# Patient Record
Sex: FEMALE | Race: WHITE | NOT HISPANIC OR LATINO | Employment: OTHER | URBAN - NONMETROPOLITAN AREA
[De-identification: names, ages, dates, MRNs, and addresses within clinical notes are randomized per-mention and may not be internally consistent; named-entity substitution may affect disease eponyms.]

---

## 2017-01-03 DIAGNOSIS — F34.1 DYSTHYMIA: Primary | ICD-10-CM

## 2017-01-03 NOTE — Clinical Note
[address] [address]              Raquel Sanchez  [address] [facility] [address]    January 5, 2017      Dear Raquel Sanchez    APPOINTMENT REMINDER:   Our records indicates that it is time for you to be seen for a recheck.     Your current medication request for Celexa will be approved for one refill but you will need to be seen before any additional refills can be approved.  Taking care of your health is important to us, and ongoing visits with your provider are vital to your care.    We look forward to seeing you in the near future.  You may call our office at [phone] to schedule a visit.     Please disregard this notice if you have already made an appointment.          Sincerely,    Pelon David MD

## 2017-01-03 NOTE — TELEPHONE ENCOUNTER
Celexa     Last Written Prescription Date: 12/08/16  Last Fill Quantity: 30, # refills: 0  Last Office Visit with [facility] primary care provider:  05/16/2016        Last PHQ-9 score on record=   PHQ-9 SCORE 5/5/2016   Total Score -   Total Score 15

## 2017-01-04 DIAGNOSIS — R21 RASH AND NONSPECIFIC SKIN ERUPTION: Primary | ICD-10-CM

## 2017-01-04 RX ORDER — HYDROCODONE BITARTRATE AND ACETAMINOPHEN 5; 325 MG/1; MG/1
1 TABLET ORAL EVERY 6 HOURS PRN
Qty: 30 TABLET | Refills: 0 | Status: SHIPPED | OUTPATIENT
Start: 2017-01-04 | End: 2017-01-25

## 2017-01-04 NOTE — TELEPHONE ENCOUNTER
norco      Last Written Prescription Date: 12/8/16  Last Fill Quantity: 60,  # refills: 0   Last Office Visit with G, [facility] or [facility] prescribing provider: 5/16/16

## 2017-01-04 NOTE — TELEPHONE ENCOUNTER
Last visit for pain med was 2-16. Needs appt with Dr. David. I will fill for 2 weeks, so she has time to arrange.

## 2017-01-04 NOTE — TELEPHONE ENCOUNTER
Called informed written RX is ready to  at  [facility]  Advised patient needs a appt with Dr David for rita Riggs

## 2017-01-05 RX ORDER — CITALOPRAM HYDROBROMIDE 20 MG/1
TABLET ORAL
Qty: 30 TABLET | Refills: 0 | Status: SHIPPED | OUTPATIENT
Start: 2017-01-05 | End: 2017-01-25

## 2017-01-25 ENCOUNTER — OFFICE VISIT (OUTPATIENT)
Dept: FAMILY MEDICINE | Facility: OTHER | Age: 67
End: 2017-01-25
Attending: FAMILY MEDICINE
Payer: MEDICARE

## 2017-01-25 VITALS
BODY MASS INDEX: 23.39 KG/M2 | WEIGHT: 132 LBS | HEIGHT: 63 IN | OXYGEN SATURATION: 98 % | DIASTOLIC BLOOD PRESSURE: 84 MMHG | SYSTOLIC BLOOD PRESSURE: 130 MMHG | TEMPERATURE: 97.6 F | RESPIRATION RATE: 16 BRPM | HEART RATE: 92 BPM

## 2017-01-25 DIAGNOSIS — F34.1 DYSTHYMIA: ICD-10-CM

## 2017-01-25 DIAGNOSIS — R21 RASH AND NONSPECIFIC SKIN ERUPTION: ICD-10-CM

## 2017-01-25 DIAGNOSIS — G89.29 OTHER CHRONIC PAIN: ICD-10-CM

## 2017-01-25 DIAGNOSIS — F41.9 ANXIETY: Primary | ICD-10-CM

## 2017-01-25 PROBLEM — Z79.899 CONTROLLED SUBSTANCE AGREEMENT SIGNED: Status: ACTIVE | Noted: 2017-01-25

## 2017-01-25 LAB
AMPHETAMINES UR QL SCN: ABNORMAL
BARBITURATES UR QL: ABNORMAL
BENZODIAZ UR QL: ABNORMAL
CANNABINOIDS UR QL SCN: ABNORMAL
COCAINE UR QL: ABNORMAL
METHADONE UR QL SCN: ABNORMAL
OPIATES UR QL SCN: ABNORMAL
PCP UR QL SCN: ABNORMAL

## 2017-01-25 PROCEDURE — 99212 OFFICE O/P EST SF 10 MIN: CPT

## 2017-01-25 PROCEDURE — 80307 DRUG TEST PRSMV CHEM ANLYZR: CPT | Performed by: FAMILY MEDICINE

## 2017-01-25 PROCEDURE — 99214 OFFICE O/P EST MOD 30 MIN: CPT | Performed by: FAMILY MEDICINE

## 2017-01-25 RX ORDER — CLONAZEPAM 0.5 MG/1
TABLET ORAL
Qty: 30 TABLET | Refills: 5 | Status: SHIPPED | OUTPATIENT
Start: 2017-01-25 | End: 2017-01-25

## 2017-01-25 RX ORDER — HYDROCODONE BITARTRATE AND ACETAMINOPHEN 5; 325 MG/1; MG/1
1 TABLET ORAL EVERY 6 HOURS PRN
Qty: 60 TABLET | Refills: 0 | Status: SHIPPED | OUTPATIENT
Start: 2017-01-25 | End: 2017-02-22

## 2017-01-25 RX ORDER — CITALOPRAM HYDROBROMIDE 20 MG/1
TABLET ORAL
Qty: 30 TABLET | Refills: 5 | Status: SHIPPED | OUTPATIENT
Start: 2017-01-25 | End: 2017-04-19

## 2017-01-25 RX ORDER — CLONAZEPAM 0.5 MG/1
TABLET ORAL
Qty: 60 TABLET | Refills: 5 | Status: SHIPPED | OUTPATIENT
Start: 2017-01-25 | End: 2017-07-18

## 2017-01-25 ASSESSMENT — ANXIETY QUESTIONNAIRES
1. FEELING NERVOUS, ANXIOUS, OR ON EDGE: NEARLY EVERY DAY
5. BEING SO RESTLESS THAT IT IS HARD TO SIT STILL: NOT AT ALL
2. NOT BEING ABLE TO STOP OR CONTROL WORRYING: NEARLY EVERY DAY
3. WORRYING TOO MUCH ABOUT DIFFERENT THINGS: NEARLY EVERY DAY
7. FEELING AFRAID AS IF SOMETHING AWFUL MIGHT HAPPEN: NEARLY EVERY DAY
IF YOU CHECKED OFF ANY PROBLEMS ON THIS QUESTIONNAIRE, HOW DIFFICULT HAVE THESE PROBLEMS MADE IT FOR YOU TO DO YOUR WORK, TAKE CARE OF THINGS AT HOME, OR GET ALONG WITH OTHER PEOPLE: SOMEWHAT DIFFICULT
GAD7 TOTAL SCORE: 16
6. BECOMING EASILY ANNOYED OR IRRITABLE: MORE THAN HALF THE DAYS

## 2017-01-25 ASSESSMENT — PATIENT HEALTH QUESTIONNAIRE - PHQ9: 5. POOR APPETITE OR OVEREATING: MORE THAN HALF THE DAYS

## 2017-01-25 ASSESSMENT — PAIN SCALES - GENERAL: PAINLEVEL: NO PAIN (0)

## 2017-01-25 NOTE — MR AVS SNAPSHOT
"              After Visit Summary   1/25/2017    Raquel Sanchez    MRN: [id number]           Patient Information     Date Of Birth          1950        Visit Information        Provider Department      1/25/2017 10:15 AM Pelon David MD [facility]        Today's Diagnoses     Anxiety    -  1     Dysthymia         Rash and nonspecific skin eruption         Other chronic pain           Care Instructions    F/u with ongoing concerns.         Follow-ups after your visit        Who to contact     If you have questions or need follow up information about today's clinic visit or your schedule please contact [facility] directly at [phone].  Normal or non-critical lab and imaging results will be communicated to you by MyChart, letter or phone within 4 business days after the clinic has received the results. If you do not hear from us within 7 days, please contact the clinic through MyChart or phone. If you have a critical or abnormal lab result, we will notify you by phone as soon as possible.  Submit refill requests through [employer] or call your pharmacy and they will forward the refill request to us. Please allow 3 business days for your refill to be completed.          Additional Information About Your Visit        Care EveryWhere ID     This is your Care EveryWhere ID. This could be used by other organizations to access your [facility] medical records  [id number]        Your Vitals Were     Pulse Temperature Respirations Height BMI (Body Mass Index) Pulse Oximetry    92 97.6  F (36.4  C) (Tympanic) 16 5' 2.5\" (1.588 m) 23.74 kg/m2 98%       Blood Pressure from Last 3 Encounters:   01/25/17 130/84   07/12/16 122/68   05/16/16 132/76    Weight from Last 3 Encounters:   01/25/17 132 lb (59.875 kg)   07/12/16 152 lb (68.947 kg)   05/16/16 152 lb (68.947 kg)              We Performed the Following     Drug Screen Urine (Range)          Today's Medication Changes          These " changes are accurate as of: 1/25/17 12:45 PM.  If you have any questions, ask your nurse or doctor.               Start taking these medicines.        Dose/Directions    clonazePAM 0.5 MG tablet   Commonly known as:  klonoPIN   Used for:  Anxiety   Started by:  Pelon David MD        1 tab bid prn   Quantity:  60 tablet   Refills:  5         These medicines have changed or have updated prescriptions.        Dose/Directions    citalopram 20 MG tablet   Commonly known as:  celeXA   This may have changed:  See the new instructions.   Used for:  Dysthymia   Changed by:  Pelon David MD        TAKE 1 TABLET (20MG) BY MOU TH DAILY   Quantity:  30 tablet   Refills:  5            Where to get your medicines      These medications were sent to [id number] - [place] - [address]  [address] [place] [address]     Phone:  [phone]    - citalopram 20 MG tablet      Some of these will need a paper prescription and others can be bought over the counter.  Ask your nurse if you have questions.     Bring a paper prescription for each of these medications    - clonazePAM 0.5 MG tablet  - HYDROcodone-acetaminophen 5-325 MG per tablet             Primary Care Provider Office Phone # Fax #    Pelon David [phone]       [address] [place] [facility] [address]        Thank you!     Thank you for choosing [facility]  for your care. Our goal is always to provide you with excellent care. Hearing back from our patients is one way we can continue to improve our services. Please take a few minutes to complete the written survey that you may receive in the mail after your visit with us. Thank you!             Your Updated Medication List - Protect others around you: Learn how to safely use, store and throw away your medicines at www.disposemymeds.org.          This list is accurate as of: 1/25/17 12:45 PM.  Always use your most recent med  list.                   Brand Name Dispense Instructions for use    citalopram 20 MG tablet    celeXA    30 tablet    TAKE 1 TABLET (20MG) BY CONCEPCIÓN TH DAILY       clonazePAM 0.5 MG tablet    klonoPIN    60 tablet    1 tab bid prn       HYDROcodone-acetaminophen 5-325 MG per tablet    NORCO    60 tablet    Take 1 tablet by mouth every 6 hours as needed for moderate to severe pain       hydrOXYzine 25 MG capsule    VISTARIL    120 capsule    Take 1 capsule (25 mg) by mouth 3 times daily as needed for itching       MULTIVITAMIN ADULTS 50+ Tabs      Take 1 tablet by mouth daily       * triamcinolone 0.025 % cream    KENALOG    80 g    Apply once a day for itch or redness       * triamcinolone 0.5 % cream    KENALOG    30 g    APPLY SPARINGLY TO AFFECTED AREA THREE TIMES DAILY.       * Notice:  This list has 2 medication(s) that are the same as other medications prescribed for you. Read the directions carefully, and ask your doctor or other care provider to review them with you.

## 2017-01-25 NOTE — NURSING NOTE
"Chief Complaint   Patient presents with     Refill Request     Hydrocodone, Celexa and Klonopin (meds pended)       Initial /84 mmHg  Pulse 92  Temp(Src) 97.6  F (36.4  C) (Tympanic)  Resp 16  Ht 5' 2.5\" (1.588 m)  Wt 132 lb (59.875 kg)  BMI 23.74 kg/m2  SpO2 98% Estimated body mass index is 23.74 kg/(m^2) as calculated from the following:    Height as of this encounter: 5' 2.5\" (1.588 m).    Weight as of this encounter: 132 lb (59.875 kg).  BP completed using cuff size: regular  Kathy Darnell LPN      "
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
## 2017-01-25 NOTE — Clinical Note
1/26/2017     Raquel Sanchez  [address] [facility] [address]      Dear Raquel:    Thank you for allowing me to participate in your care. Your recent test results were reviewed and listed below. Your lab is normal.      Your results are provided below for your review  Results for orders placed or performed in visit on 01/25/17   Drug Screen Urine (Range)   Result Value Ref Range    Amphetamine Qual Urine  NEG     Negative   Cutoff for a negative amphetamine is 500 ng/mL or less.      Barbiturates Qual Urine  NEG     Negative   Cutoff for a negative barbiturate is 200 ng/mL or less.      Benzodiazepine Qual Urine  NEG     Negative   Cutoff for a negative benzodiazepine is 200 ng/mL or less.      Cannabinoids Qual Urine  NEG     Negative   Cutoff for a negative cannabinoid is 50 ng/mL or less.      Cocaine Qual Urine  NEG     Negative   Cutoff for a negative cocaine is 300 ng/mL or less.      Opiates Qualitative Urine (A) NEG     Positive   Cutoff for a positive opiate is greater than 300 ng/mL. This is an unconfirmed   screening result to be used for medical purposes only.      Methadone Qual Urine  NEG     Negative   Cutoff for a negative methadone is 300 ng/mL or less.      PCP Qual Urine  NEG     Negative   Cutoff for a negative PCP is 25 ng/mL or less.               Thank you for choosing [facility]. As a result, please continue with the treatment plan discussed in the office. Return as discussed or sooner if symptoms worsens or fail to improve. If you have any further questions or concerns, please do not hesitate to contact us.      Sincerely,    Dr MATHEW David/Maria De Jesus  [facility]  [address]  [facility] [address]  Phone: [phone]

## 2017-01-26 ASSESSMENT — ANXIETY QUESTIONNAIRES: GAD7 TOTAL SCORE: 16

## 2017-01-26 ASSESSMENT — PATIENT HEALTH QUESTIONNAIRE - PHQ9: SUM OF ALL RESPONSES TO PHQ QUESTIONS 1-9: 6

## 2017-02-22 ENCOUNTER — TELEPHONE (OUTPATIENT)
Dept: FAMILY MEDICINE | Facility: OTHER | Age: 67
End: 2017-02-22

## 2017-02-22 DIAGNOSIS — R21 RASH AND NONSPECIFIC SKIN ERUPTION: ICD-10-CM

## 2017-02-22 RX ORDER — HYDROCODONE BITARTRATE AND ACETAMINOPHEN 5; 325 MG/1; MG/1
1 TABLET ORAL EVERY 6 HOURS PRN
Qty: 60 TABLET | Refills: 0 | Status: SHIPPED | OUTPATIENT
Start: 2017-02-22 | End: 2017-03-22

## 2017-02-22 NOTE — TELEPHONE ENCOUNTER
Called informed written RX is ready to  at  [facility]  Called daughter/Jess REECE ph#[phone]  Liseth Riggs

## 2017-02-22 NOTE — TELEPHONE ENCOUNTER
Pt is in [place] with her spouse and is calling the pharmacy to have RX sent. She is requesting that her daughter be called when it is ready for pickup at [phone]. Her name is Jess Sanchez. Please and Thank you.

## 2017-03-22 DIAGNOSIS — R21 RASH AND NONSPECIFIC SKIN ERUPTION: ICD-10-CM

## 2017-03-22 RX ORDER — HYDROCODONE BITARTRATE AND ACETAMINOPHEN 5; 325 MG/1; MG/1
1 TABLET ORAL EVERY 6 HOURS PRN
Qty: 60 TABLET | Refills: 0 | Status: SHIPPED | OUTPATIENT
Start: 2017-03-22 | End: 2017-04-19

## 2017-03-22 NOTE — TELEPHONE ENCOUNTER
norco      Last Written Prescription Date: 2/22/17  Last Fill Quantity: 60,  # refills: 0   Last Office Visit with G, P or [facility] prescribing provider: 1/25/17

## 2017-04-12 ENCOUNTER — TELEPHONE (OUTPATIENT)
Dept: FAMILY MEDICINE | Facility: OTHER | Age: 67
End: 2017-04-12
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
## 2017-04-17 ENCOUNTER — OFFICE VISIT (OUTPATIENT)
Dept: FAMILY MEDICINE | Facility: OTHER | Age: 67
End: 2017-04-17
Attending: PHYSICIAN ASSISTANT
Payer: MEDICARE

## 2017-04-17 VITALS
HEART RATE: 111 BPM | BODY MASS INDEX: 23.47 KG/M2 | TEMPERATURE: 97.8 F | OXYGEN SATURATION: 98 % | WEIGHT: 130.4 LBS | DIASTOLIC BLOOD PRESSURE: 88 MMHG | RESPIRATION RATE: 20 BRPM | SYSTOLIC BLOOD PRESSURE: 144 MMHG

## 2017-04-17 DIAGNOSIS — L03.113 CELLULITIS OF RIGHT UPPER EXTREMITY: Primary | ICD-10-CM

## 2017-04-17 PROCEDURE — 99212 OFFICE O/P EST SF 10 MIN: CPT

## 2017-04-17 PROCEDURE — 99213 OFFICE O/P EST LOW 20 MIN: CPT | Performed by: PHYSICIAN ASSISTANT

## 2017-04-17 RX ORDER — CEPHALEXIN 500 MG/1
500 CAPSULE ORAL 3 TIMES DAILY
Qty: 30 CAPSULE | Refills: 0 | Status: SHIPPED | OUTPATIENT
Start: 2017-04-17 | End: 2017-10-04

## 2017-04-17 ASSESSMENT — PAIN SCALES - GENERAL: PAINLEVEL: NO PAIN (0)

## 2017-04-17 NOTE — PROGRESS NOTES
Subjective:  Raquel Sanchez is a 67 year old female who presents with 3 day history of skin tear to right arm. Today developed redness around the area.        PMH, PSH, FH reviewed and unchanged    Current Outpatient Prescriptions   Medication     HYDROcodone-acetaminophen (NORCO) 5-325 MG per tablet     citalopram (CELEXA) 20 MG tablet     clonazePAM (KLONOPIN) 0.5 MG tablet     Multiple Vitamins-Minerals (MULTIVITAMIN ADULTS 50+) TABS     hydrOXYzine (VISTARIL) 25 MG capsule     triamcinolone (KENALOG) 0.025 % cream     No current facility-administered medications for this visit.        Allergies   Allergen Reactions     Tape [Adhesive Tape] Blisters     Poison Ivy Extract [Extract Of Poison Ivy] Rash       Review of Systems:  Gen: negative for fever, chills, change in weight  Derm: See HPI       Objective:    B/P: 144/88, T: 97.8, P: 111, R: 20    Physical Exam:  Constitutional: healthy, alert and no distress  Skin: 2 cm skin tear right dorsal forearm, 8 cm surrounding erythema. Vaseline gauze dressing applied.  Psych: Mood is euthymic with corresponding affect      Assessment and Plan  (L03.113) Cellulitis of right upper extremity  (primary encounter diagnosis)  Comment: Keep clean and dry. Daily dressing change        Rx per EPIC.  Risk/benefit/side effects and intended purposes discussed.   Follow up with worsening sx or failure to improve or with any questions or concerns.     Mariann Ulrich PA-C

## 2017-04-17 NOTE — MR AVS SNAPSHOT
After Visit Summary   4/17/2017    Raquel Sanchez    MRN: [id number]           Patient Information     Date Of Birth          1950        Visit Information        Provider Department      4/17/2017 4:15 PM Mariann Ulrich PA [facility]        Today's Diagnoses     Cellulitis of right upper extremity    -  1      Care Instructions    Follow up if symptoms persist or worsen.          Follow-ups after your visit        Your next 10 appointments already scheduled     Apr 19, 2017  8:30 AM CDT   (Arrive by 8:15 AM)   SHORT with Pelon David MD   [facility] ([facility])    [address]  [facility] [address]   [phone]              Who to contact     If you have questions or need follow up information about today's clinic visit or your schedule please contact [facility] directly at [phone].  Normal or non-critical lab and imaging results will be communicated to you by MyChart, letter or phone within 4 business days after the clinic has received the results. If you do not hear from us within 7 days, please contact the clinic through MyChart or phone. If you have a critical or abnormal lab result, we will notify you by phone as soon as possible.  Submit refill requests through [employer] or call your pharmacy and they will forward the refill request to us. Please allow 3 business days for your refill to be completed.          Additional Information About Your Visit        Care EveryWhere ID     This is your Care EveryWhere ID. This could be used by other organizations to access your [facility] medical records  [id number]        Your Vitals Were     Pulse Temperature Respirations Pulse Oximetry BMI (Body Mass Index)       111 97.8  F (36.6  C) (Tympanic) 20 98% 23.47 kg/m2        Blood Pressure from Last 3 Encounters:   04/17/17 144/88   01/25/17 130/84   07/12/16 122/68    Weight from Last 3 Encounters:   04/17/17 130 lb 6.4 oz (59.1  kg)   01/25/17 132 lb (59.9 kg)   07/12/16 152 lb (68.9 kg)              Today, you had the following     No orders found for display         Today's Medication Changes          These changes are accurate as of: 4/17/17  5:13 PM.  If you have any questions, ask your nurse or doctor.               Start taking these medicines.        Dose/Directions    cephALEXin 500 MG capsule   Commonly known as:  KEFLEX   Used for:  Cellulitis of right upper extremity   Started by:  Mariann Ulrich PA        Dose:  500 mg   Take 1 capsule (500 mg) by mouth 3 times daily   Quantity:  30 capsule   Refills:  0         These medicines have changed or have updated prescriptions.        Dose/Directions    triamcinolone 0.025 % cream   Commonly known as:  KENALOG   This may have changed:  Another medication with the same name was removed. Continue taking this medication, and follow the directions you see here.   Used for:  Lichen simplex   Changed by:  AIMEE Briseno MD        Apply once a day for itch or redness   Quantity:  80 g   Refills:  3            Where to get your medicines      These medications were sent to [id number] - [place], [place] - [address]  [address] [facility], [place] [address]     Phone:  [phone]     cephALEXin 500 MG capsule                Primary Care Provider Office Phone # Fax #    Pelon David [phone]       [address] [address]        Thank you!     Thank you for choosing [facility]  for your care. Our goal is always to provide you with excellent care. Hearing back from our patients is one way we can continue to improve our services. Please take a few minutes to complete the written survey that you may receive in the mail after your visit with us. Thank you!             Your Updated Medication List - Protect others around you: Learn how to safely use, store and throw away your medicines at  www.disposemymeds.org.          This list is accurate as of: 4/17/17  5:13 PM.  Always use your most recent med list.                   Brand Name Dispense Instructions for use    cephALEXin 500 MG capsule    KEFLEX    30 capsule    Take 1 capsule (500 mg) by mouth 3 times daily       citalopram 20 MG tablet    celeXA    30 tablet    TAKE 1 TABLET (20MG) BY CONCEPCIÓN TH DAILY       clonazePAM 0.5 MG tablet    klonoPIN    60 tablet    1 tab bid prn       HYDROcodone-acetaminophen 5-325 MG per tablet    NORCO    60 tablet    Take 1 tablet by mouth every 6 hours as needed for moderate to severe pain       hydrOXYzine 25 MG capsule    VISTARIL    120 capsule    Take 1 capsule (25 mg) by mouth 3 times daily as needed for itching       MULTIVITAMIN ADULTS 50+ Tabs      Take 1 tablet by mouth daily       triamcinolone 0.025 % cream    KENALOG    80 g    Apply once a day for itch or redness

## 2017-04-17 NOTE — NURSING NOTE
"Chief Complaint   Patient presents with     Sore     sore on arm, red and draining, hit arm on bathroom door saturday.        Initial /88 (BP Location: Right arm, Patient Position: Chair, Cuff Size: Adult Regular)  Pulse 111  Temp 97.8  F (36.6  C) (Tympanic)  Resp 20  Wt 130 lb 6.4 oz (59.1 kg)  SpO2 98%  BMI 23.47 kg/m2 Estimated body mass index is 23.47 kg/(m^2) as calculated from the following:    Height as of 1/25/17: 5' 2.5\" (1.588 m).    Weight as of this encounter: 130 lb 6.4 oz (59.1 kg).  Medication Reconciliation: earle Guerrier LPN      "

## 2017-04-19 ENCOUNTER — OFFICE VISIT (OUTPATIENT)
Dept: FAMILY MEDICINE | Facility: OTHER | Age: 67
End: 2017-04-19
Attending: FAMILY MEDICINE
Payer: MEDICARE

## 2017-04-19 VITALS
SYSTOLIC BLOOD PRESSURE: 134 MMHG | DIASTOLIC BLOOD PRESSURE: 80 MMHG | HEART RATE: 91 BPM | RESPIRATION RATE: 16 BRPM | TEMPERATURE: 96.5 F | WEIGHT: 131.6 LBS | BODY MASS INDEX: 23.32 KG/M2 | HEIGHT: 63 IN | OXYGEN SATURATION: 98 %

## 2017-04-19 DIAGNOSIS — R21 RASH AND NONSPECIFIC SKIN ERUPTION: ICD-10-CM

## 2017-04-19 DIAGNOSIS — F34.1 DYSTHYMIA: ICD-10-CM

## 2017-04-19 DIAGNOSIS — S51.811D SKIN TEAR OF FOREARM WITHOUT COMPLICATION, RIGHT, SUBSEQUENT ENCOUNTER: Primary | ICD-10-CM

## 2017-04-19 PROCEDURE — 99214 OFFICE O/P EST MOD 30 MIN: CPT | Performed by: FAMILY MEDICINE

## 2017-04-19 PROCEDURE — 99212 OFFICE O/P EST SF 10 MIN: CPT

## 2017-04-19 RX ORDER — CITALOPRAM HYDROBROMIDE 20 MG/1
TABLET ORAL
Qty: 60 TABLET | Refills: 3 | Status: SHIPPED | OUTPATIENT
Start: 2017-04-19 | End: 2017-04-19

## 2017-04-19 RX ORDER — HYDROCODONE BITARTRATE AND ACETAMINOPHEN 5; 325 MG/1; MG/1
1 TABLET ORAL EVERY 6 HOURS PRN
Qty: 60 TABLET | Refills: 0 | Status: SHIPPED | OUTPATIENT
Start: 2017-04-19 | End: 2017-05-17

## 2017-04-19 RX ORDER — CITALOPRAM HYDROBROMIDE 40 MG/1
TABLET ORAL
Start: 2017-04-19 | End: 2017-10-04

## 2017-04-19 ASSESSMENT — ANXIETY QUESTIONNAIRES
IF YOU CHECKED OFF ANY PROBLEMS ON THIS QUESTIONNAIRE, HOW DIFFICULT HAVE THESE PROBLEMS MADE IT FOR YOU TO DO YOUR WORK, TAKE CARE OF THINGS AT HOME, OR GET ALONG WITH OTHER PEOPLE: SOMEWHAT DIFFICULT
6. BECOMING EASILY ANNOYED OR IRRITABLE: NEARLY EVERY DAY
2. NOT BEING ABLE TO STOP OR CONTROL WORRYING: NEARLY EVERY DAY
7. FEELING AFRAID AS IF SOMETHING AWFUL MIGHT HAPPEN: NEARLY EVERY DAY
5. BEING SO RESTLESS THAT IT IS HARD TO SIT STILL: MORE THAN HALF THE DAYS
1. FEELING NERVOUS, ANXIOUS, OR ON EDGE: NEARLY EVERY DAY
GAD7 TOTAL SCORE: 19
3. WORRYING TOO MUCH ABOUT DIFFERENT THINGS: NEARLY EVERY DAY

## 2017-04-19 ASSESSMENT — PAIN SCALES - GENERAL: PAINLEVEL: EXTREME PAIN (8)

## 2017-04-19 ASSESSMENT — PATIENT HEALTH QUESTIONNAIRE - PHQ9: 5. POOR APPETITE OR OVEREATING: MORE THAN HALF THE DAYS
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
## 2017-04-19 NOTE — MR AVS SNAPSHOT
"              After Visit Summary   4/19/2017    Raquel Sanchez    MRN: [id number]           Patient Information     Date Of Birth          1950        Visit Information        Provider Department      4/19/2017 8:30 AM Pelon David MD [facility]        Today's Diagnoses     Skin tear of forearm without complication, right, subsequent encounter    -  1    Dysthymia        Rash and nonspecific skin eruption          Care Instructions    F/u with ongoing concerns.         Follow-ups after your visit        Who to contact     If you have questions or need follow up information about today's clinic visit or your schedule please contact [facility] directly at [phone].  Normal or non-critical lab and imaging results will be communicated to you by MyChart, letter or phone within 4 business days after the clinic has received the results. If you do not hear from us within 7 days, please contact the clinic through MyChart or phone. If you have a critical or abnormal lab result, we will notify you by phone as soon as possible.  Submit refill requests through [employer] or call your pharmacy and they will forward the refill request to us. Please allow 3 business days for your refill to be completed.          Additional Information About Your Visit        Care EveryWhere ID     This is your Care EveryWhere ID. This could be used by other organizations to access your [facility] medical records  [id number]        Your Vitals Were     Pulse Temperature Respirations Height Pulse Oximetry BMI (Body Mass Index)    91 96.5  F (35.8  C) (Tympanic) 16 5' 2.5\" (1.588 m) 98% 23.69 kg/m2       Blood Pressure from Last 3 Encounters:   04/19/17 134/80   04/17/17 144/88   01/25/17 130/84    Weight from Last 3 Encounters:   04/19/17 131 lb 9.6 oz (59.7 kg)   04/17/17 130 lb 6.4 oz (59.1 kg)   01/25/17 132 lb (59.9 kg)              Today, you had the following     No orders found for display       "   Today's Medication Changes          These changes are accurate as of: 4/19/17  9:06 AM.  If you have any questions, ask your nurse or doctor.               These medicines have changed or have updated prescriptions.        Dose/Directions    citalopram 20 MG tablet   Commonly known as:  celeXA   This may have changed:  additional instructions   Used for:  Dysthymia   Changed by:  Pelon David MD        TAKE 2 TABLET (40MG) BY MOUTH DAILY   Quantity:  60 tablet   Refills:  3            Where to get your medicines      These medications were sent to [id number] - [place] - [address]  [address] [place] [address]     Phone:  [phone]     citalopram 20 MG tablet         Some of these will need a paper prescription and others can be bought over the counter.  Ask your nurse if you have questions.     Bring a paper prescription for each of these medications     HYDROcodone-acetaminophen 5-325 MG per tablet                Primary Care Provider Office Phone # Fax #    Pelon David [phone]       [address] [address]        Thank you!     Thank you for choosing [facility]  for your care. Our goal is always to provide you with excellent care. Hearing back from our patients is one way we can continue to improve our services. Please take a few minutes to complete the written survey that you may receive in the mail after your visit with us. Thank you!             Your Updated Medication List - Protect others around you: Learn how to safely use, store and throw away your medicines at www.disposemymeds.org.          This list is accurate as of: 4/19/17  9:06 AM.  Always use your most recent med list.                   Brand Name Dispense Instructions for use    cephALEXin 500 MG capsule    KEFLEX    30 capsule    Take 1 capsule (500 mg) by mouth 3 times daily       citalopram 20 MG tablet    celeXA    60 tablet     TAKE 2 TABLET (40MG) BY MOUTH DAILY       clonazePAM 0.5 MG tablet    klonoPIN    60 tablet    1 tab bid prn       HYDROcodone-acetaminophen 5-325 MG per tablet    NORCO    60 tablet    Take 1 tablet by mouth every 6 hours as needed for moderate to severe pain       hydrOXYzine 25 MG capsule    VISTARIL    120 capsule    Take 1 capsule (25 mg) by mouth 3 times daily as needed for itching       MULTIVITAMIN ADULTS 50+ Tabs      Take 1 tablet by mouth daily       triamcinolone 0.025 % cream    KENALOG    80 g    Apply once a day for itch or redness

## 2017-04-19 NOTE — TELEPHONE ENCOUNTER
Fax received from [facility] the pt's insurance will not pay for her to take 2 tabs daily of the 20mg Celexa, so they changed it to 40mg 1 tab daily. Please update the pt's med list.
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
## 2017-04-20 ASSESSMENT — ANXIETY QUESTIONNAIRES: GAD7 TOTAL SCORE: 19

## 2017-04-20 ASSESSMENT — PATIENT HEALTH QUESTIONNAIRE - PHQ9: SUM OF ALL RESPONSES TO PHQ QUESTIONS 1-9: 18

## 2017-05-17 DIAGNOSIS — L30.9 DERMATITIS: ICD-10-CM

## 2017-05-17 DIAGNOSIS — R21 RASH AND NONSPECIFIC SKIN ERUPTION: ICD-10-CM

## 2017-05-17 RX ORDER — HYDROCODONE BITARTRATE AND ACETAMINOPHEN 5; 325 MG/1; MG/1
1 TABLET ORAL EVERY 6 HOURS PRN
Qty: 60 TABLET | Refills: 0 | Status: SHIPPED | OUTPATIENT
Start: 2017-05-17 | End: 2017-06-16

## 2017-05-17 NOTE — TELEPHONE ENCOUNTER
norco      Last Written Prescription Date: 4/19/17  Last Fill Quantity: 60,  # refills: 0   Last Office Visit with G, P or [facility] prescribing provider: 4/19/17

## 2017-05-18 RX ORDER — TRIAMCINOLONE ACETONIDE 5 MG/G
CREAM TOPICAL
Qty: 30 G | Refills: 0 | Status: SHIPPED | OUTPATIENT
Start: 2017-05-18 | End: 2017-06-14

## 2017-05-18 NOTE — TELEPHONE ENCOUNTER
Kenalog       Last Written Prescription Date: 9-  Last Fill Quantity: 30g,  # refills: 0   Last Office Visit with G, P or [facility] prescribing provider: 4-

## 2017-06-14 DIAGNOSIS — L30.9 DERMATITIS: ICD-10-CM

## 2017-06-16 DIAGNOSIS — R21 RASH AND NONSPECIFIC SKIN ERUPTION: ICD-10-CM

## 2017-06-16 RX ORDER — HYDROCODONE BITARTRATE AND ACETAMINOPHEN 5; 325 MG/1; MG/1
1 TABLET ORAL EVERY 6 HOURS PRN
Qty: 60 TABLET | Refills: 0 | Status: SHIPPED | OUTPATIENT
Start: 2017-06-16 | End: 2017-07-18

## 2017-06-16 NOTE — TELEPHONE ENCOUNTER
norco      Last Written Prescription Date: 5/17/17  Last Fill Quantity: 60,  # refills: 0   Last Office Visit with G, P or [facility] prescribing provider: 4/19/17

## 2017-06-16 NOTE — TELEPHONE ENCOUNTER
Spoke to patient informing that her prescription is signed and ready to be picked up at the  of the [facility]. Patient stated understanding.

## 2017-06-19 RX ORDER — TRIAMCINOLONE ACETONIDE 5 MG/G
CREAM TOPICAL
Qty: 30 G | Refills: 2 | Status: SHIPPED | OUTPATIENT
Start: 2017-06-19 | End: 2017-12-28

## 2017-07-18 DIAGNOSIS — F41.9 ANXIETY: ICD-10-CM

## 2017-07-18 DIAGNOSIS — R21 RASH AND NONSPECIFIC SKIN ERUPTION: ICD-10-CM

## 2017-07-18 RX ORDER — CLONAZEPAM 0.5 MG/1
TABLET ORAL
Qty: 60 TABLET | Refills: 5 | Status: SHIPPED | OUTPATIENT
Start: 2017-07-18 | End: 2018-01-10

## 2017-07-18 RX ORDER — HYDROCODONE BITARTRATE AND ACETAMINOPHEN 5; 325 MG/1; MG/1
1 TABLET ORAL EVERY 6 HOURS PRN
Qty: 60 TABLET | Refills: 0 | Status: SHIPPED | OUTPATIENT
Start: 2017-07-18 | End: 2017-08-21

## 2017-07-18 NOTE — TELEPHONE ENCOUNTER
norco      Last Written Prescription Date: 6/16/17  Last Fill Quantity: 60,  # refills: 0   Last Office Visit with [facility], [facility] or [facility] prescribing provider: 4/19/17                                             klonopin      Last Written Prescription Date: 1/25/17  Last Fill Quantity: 60,  # refills: 5   Last Office Visit with [facility], [facility] or [facility] prescribing provider: 4/19/17

## 2017-08-15 DIAGNOSIS — F34.1 DYSTHYMIA: ICD-10-CM

## 2017-08-16 RX ORDER — CITALOPRAM HYDROBROMIDE 40 MG/1
TABLET ORAL
Qty: 30 TABLET | Refills: 0 | Status: SHIPPED | OUTPATIENT
Start: 2017-08-16 | End: 2017-09-14

## 2017-08-16 NOTE — TELEPHONE ENCOUNTER
Celexa - Last PHQ 9 on 4.19.17 was 18. Does patient need follow up or are you okay filling. Thank you  Last Written Prescription Date: 4.19.17  Last Fill Quantity: 0; # refills: 0  Last Office Visit with [facility], [facility] or [facility] prescribing provider:  4.19.17        Last PHQ-9 score on record=   PHQ-9 SCORE 4/19/2017   Total Score -   Total Score 18

## 2017-08-21 DIAGNOSIS — R21 RASH AND NONSPECIFIC SKIN ERUPTION: ICD-10-CM

## 2017-08-21 RX ORDER — HYDROCODONE BITARTRATE AND ACETAMINOPHEN 5; 325 MG/1; MG/1
1 TABLET ORAL EVERY 6 HOURS PRN
Qty: 60 TABLET | Refills: 0 | Status: SHIPPED | OUTPATIENT
Start: 2017-08-21 | End: 2017-09-20

## 2017-09-14 DIAGNOSIS — F34.1 DYSTHYMIA: ICD-10-CM

## 2017-09-14 RX ORDER — CITALOPRAM HYDROBROMIDE 40 MG/1
TABLET ORAL
Qty: 30 TABLET | Refills: 0 | Status: SHIPPED | OUTPATIENT
Start: 2017-09-14 | End: 2017-10-17

## 2017-09-14 NOTE — TELEPHONE ENCOUNTER
Celexa  Last office visit: 4/19/17 with note to follow up in 2 months. No follow up. No upcoming appointment.  4/19/17 last PHQ score 18  Last refill: 8/16/17 #30, 1 R.  Medication pended.  Please advise.  Thank you.

## 2017-09-20 DIAGNOSIS — R21 RASH AND NONSPECIFIC SKIN ERUPTION: ICD-10-CM

## 2017-09-20 RX ORDER — HYDROCODONE BITARTRATE AND ACETAMINOPHEN 5; 325 MG/1; MG/1
1 TABLET ORAL EVERY 6 HOURS PRN
Qty: 60 TABLET | Refills: 0 | Status: SHIPPED | OUTPATIENT
Start: 2017-09-20 | End: 2017-10-18

## 2017-09-20 NOTE — TELEPHONE ENCOUNTER
norco      Last Written Prescription Date: 8/21/17  Last Fill Quantity: 60,  # refills: 0   Last Office Visit with G, P or [facility] prescribing provider: 4/19/17

## 2017-09-28 PROBLEM — F34.1 DYSTHYMIA: Status: ACTIVE | Noted: 2017-09-28

## 2017-10-04 ENCOUNTER — OFFICE VISIT (OUTPATIENT)
Dept: FAMILY MEDICINE | Facility: OTHER | Age: 67
End: 2017-10-04
Attending: FAMILY MEDICINE
Payer: MEDICARE

## 2017-10-04 VITALS
OXYGEN SATURATION: 98 % | BODY MASS INDEX: 23.04 KG/M2 | SYSTOLIC BLOOD PRESSURE: 114 MMHG | HEART RATE: 75 BPM | TEMPERATURE: 97.1 F | WEIGHT: 130 LBS | DIASTOLIC BLOOD PRESSURE: 82 MMHG | HEIGHT: 63 IN

## 2017-10-04 DIAGNOSIS — L20.9 ATOPIC DERMATITIS, UNSPECIFIED TYPE: ICD-10-CM

## 2017-10-04 DIAGNOSIS — Z71.6 ENCOUNTER FOR SMOKING CESSATION COUNSELING: ICD-10-CM

## 2017-10-04 DIAGNOSIS — S51.811A SKIN TEAR OF FOREARM WITHOUT COMPLICATION, RIGHT, INITIAL ENCOUNTER: Primary | ICD-10-CM

## 2017-10-04 PROCEDURE — 99213 OFFICE O/P EST LOW 20 MIN: CPT | Performed by: NURSE PRACTITIONER

## 2017-10-04 PROCEDURE — 99212 OFFICE O/P EST SF 10 MIN: CPT

## 2017-10-04 RX ORDER — TRIAMCINOLONE ACETONIDE 1 MG/G
CREAM TOPICAL
Qty: 80 G | Refills: 0 | Status: SHIPPED | OUTPATIENT
Start: 2017-10-04 | End: 2018-01-10

## 2017-10-04 ASSESSMENT — PAIN SCALES - GENERAL: PAINLEVEL: MODERATE PAIN (5)

## 2017-10-04 ASSESSMENT — ANXIETY QUESTIONNAIRES
5. BEING SO RESTLESS THAT IT IS HARD TO SIT STILL: NOT AT ALL
2. NOT BEING ABLE TO STOP OR CONTROL WORRYING: SEVERAL DAYS
1. FEELING NERVOUS, ANXIOUS, OR ON EDGE: SEVERAL DAYS
GAD7 TOTAL SCORE: 5
4. TROUBLE RELAXING: SEVERAL DAYS
6. BECOMING EASILY ANNOYED OR IRRITABLE: SEVERAL DAYS
3. WORRYING TOO MUCH ABOUT DIFFERENT THINGS: SEVERAL DAYS
7. FEELING AFRAID AS IF SOMETHING AWFUL MIGHT HAPPEN: NOT AT ALL

## 2017-10-04 ASSESSMENT — PATIENT HEALTH QUESTIONNAIRE - PHQ9: SUM OF ALL RESPONSES TO PHQ QUESTIONS 1-9: 4

## 2017-10-04 NOTE — PROGRESS NOTES
SUBJECTIVE:                                                    Raquel Sanchez is a 67 year old female who presents to clinic today for the following health issues:      Skin tear on the right forearm near the wrist    Duration: yesterday     Description (location/character/radiation): right wrist    Intensity:  moderate    Accompanying signs and symptoms: redness    History (similar episodes/previous evaluation): None    Precipitating or alleviating factors: cut on shopping bag    Therapies tried and outcome: triple antibiotic ointment         Rash      Duration: awhile    Description  Location: left forearm  Itching: moderate    Intensity:  moderate    Accompanying signs and symptoms: dry skin    History (similar episodes/previous evaluation): None    Precipitating or alleviating factors: - possible from wearing rubber gloves, but it is only on the left arm  New exposures:  None  Recent travel: no      Therapies tried and outcome: hydrocortisone cream -  Only mildly effective        Problem list and histories reviewed & adjusted, as indicated.  Additional history: as documented    Patient Active Problem List   Diagnosis     ACP (advance care planning)     Controlled substance agreement signed     Dysthymia     Past Surgical History:   Procedure Laterality Date     GYN SURGERY      hysterectomy       Social History   Substance Use Topics     Smoking status: Current Every Day Smoker     Packs/day: 1.00     Types: Cigarettes     Smokeless tobacco: Never Used     Alcohol use 0.0 oz/week     0 Standard drinks or equivalent per week      Comment: occasional     Family History   Problem Relation Age of Onset     DIABETES No family hx of      Asthma No family hx of      Thyroid Disease No family hx of      Hypertension No family hx of      Hyperlipidemia No family hx of          Current Outpatient Prescriptions   Medication Sig Dispense Refill     HYDROcodone-acetaminophen (NORCO) 5-325 MG per tablet Take 1 tablet by  "mouth every 6 hours as needed for moderate to severe pain 60 tablet 0     citalopram (CELEXA) 40 MG tablet TAKE 1 TABLET DAILY BY MOUTH 30 tablet 0     clonazePAM (KLONOPIN) 0.5 MG tablet 1 tab bid prn 60 tablet 5     triamcinolone (KENALOG) 0.5 % cream APPLY SPARINGLY TO AFFECTED AREA THREE TIMES DAILY. 30 g 2     triamcinolone (KENALOG) 0.025 % cream Apply once a day for itch or redness 80 g 3     Multiple Vitamins-Minerals (MULTIVITAMIN ADULTS 50+) TABS Take 1 tablet by mouth daily       hydrOXYzine (VISTARIL) 25 MG capsule Take 1 capsule (25 mg) by mouth 3 times daily as needed for itching 120 capsule 1     [DISCONTINUED] citalopram (CELEXA) 40 MG tablet TAKE 1 TABLET (40MG) BY MOUTH DAILY       Allergies   Allergen Reactions     Tape [Adhesive Tape] Blisters     Poison Ivy Extract [Extract Of Poison Riddhi] Rash         ROS:  C: NEGATIVE for fever, chills, change in weight  INTEGUMENTARY/SKIN: right forearm near wrist skin tear and dry itchy skin left arm  R: NEGATIVE for significant cough or SOB  CV: NEGATIVE for chest pain, palpitations or peripheral edema    OBJECTIVE:     /82  Pulse 75  Temp 97.1  F (36.2  C)  Ht 5' 2.5\" (1.588 m)  Wt 130 lb (59 kg)  SpO2 98%  BMI 23.4 kg/m2  Body mass index is 23.4 kg/(m^2).   GENERAL: healthy, alert and no distress  RESP: lungs clear to auscultation - no rales, rhonchi or wheezes  CV: regular rate and rhythm, normal S1 S2, no S3 or S4, no murmur, click or rub, no peripheral edema and peripheral pulses strong  SKIN: left arm dry erythema patch - very itchy, Right forearm/wrist skin tear    Diagnostic Test Results:  none     ASSESSMENT:     PLAN:   ASSESSMENT / PLAN:  (S52.184U) Skin tear of forearm without complication, right, initial encounter  (primary encounter diagnosis)  Comment:   Plan:  With adhesive allergy did not apply steri strips to skin tear. Bacitracin, non-adhering dressing and arm wrapped. Raquel should change the dressing daily. She was instructed " on removing dressing to not cause further damage to her skin. No signs or symptoms of infection at this time. Raquel should return for re-evaluation if signs or symptoms of infection occur.    (L20.9) Atopic dermatitis, unspecified type  Comment: Raquel was instructed on the use of cream for her left arm.  Plan: triamcinolone (KENALOG) 0.1 % cream            (Z71.6,  Z72.0) Encounter for smoking cessation counseling  Comment: raquel states she is not ready for smoking cessation at this time.  Plan: Tobacco Cessation - for Health Maintenance            Raquel agreed with the plan of care and she should consider making an appointment with her PCP for her regular health maintenance.        Tobacco Cessation:   reports that she has been smoking Cigarettes.  She has been smoking about 1.00 pack per day. She has never used smokeless tobacco.  Tobacco Cessation Action Plan: Information offered: Patient not interested at this time  Self help information given to patient        See Patient Instructions    [facility]

## 2017-10-04 NOTE — NURSING NOTE
"Chief Complaint   Patient presents with     Laceration       Initial /82  Pulse 75  Temp 97.1  F (36.2  C)  Ht 5' 2.5\" (1.588 m)  Wt 130 lb (59 kg)  SpO2 98%  BMI 23.4 kg/m2 Estimated body mass index is 23.4 kg/(m^2) as calculated from the following:    Height as of this encounter: 5' 2.5\" (1.588 m).    Weight as of this encounter: 130 lb (59 kg).  Medication Reconciliation: complete   Nga Martinez    Pt declines prevnar, flu shot, and tdap.  Nga Martinez    "

## 2017-10-04 NOTE — PATIENT INSTRUCTIONS
Abrasions  Abrasions are skin scrapes. Their treatment depends on how large and deep the abrasion is.  Home care  You may be prescribed an antibiotic cream or ointment to apply to the wound. This helps prevent infection. Follow instructions when using this medicine.  General care    To care for the abrasion, do the following each day for as long as directed by your healthcare provider.    If you were given a bandage, change it once a day. If your bandage sticks to the wound, soak it in warm water until it loosens.    Wash the area with soap and warm water. You may do this in a sink or under a tub faucet or shower. Rinse off the soap. Then pat the area dry with a clean towel.    If antibiotic ointment or cream was prescribed, reapply it to the wound as directed. Cover the wound with a fresh nonstick bandage. If the bandage becomes wet or dirty, change it as soon as possible.    Some antibiotic ointments or cream can cause an allergic reaction or dermatitis. This may cause redness, itching and or hives. If this occurs, stop using the ointment immediately and wash off any remaining ointment. You may need to take some allergy medicine to relieve symptoms.    You may use acetaminophen or ibuprofen to control pain unless another pain medicine was prescribed. Talk with your healthcare provider before using these medicines if you have chronic liver or kidney disease or ever had a stomach ulcer or GI bleeding. Don t use ibuprofen in children younger than six months old.    Most skin wounds heal within 10 days. But an infection may occur even with treatment. So it s important to watch the wound for signs of infection as listed below.  Follow-up care  Follow up with your healthcare provider, or as advised.  When to seek medical advice  Call your healthcare provider right away if any of these occur:    Fever of 100.4 F (38 C) or higher, or as directed by your healthcare provider    Increasing pain, redness, swelling, or  drainage from the wound    Bleeding from the wound that does not stop after a few minutes of steady, firm pressure    Decreased ability to move any body part near the wound  Date Last Reviewed: 3/3/2017    3834-1720 [employer]. [address] [place] [address], [place] [address]. All rights reserved. This information is not intended as a substitute for professional medical care. Always follow your healthcare professional's instructions.        HOW TO QUIT SMOKING  Smoking is one of the hardest habits to break. About half of all those who have ever smoked have been able to quit, and most of those (about 70%) who still smoke want to quit. Here are some of the best ways to stop smoking.     KEEP TRYING:  It takes most smokers about 8 tries before they are finally able to fully quit. So, the more often you try and fail, the better your chance of quitting the next time! So, don't give up!    GO COLD TURKEY:  Most ex-smokers quit cold turkey. Trying to cut back gradually doesn't seem to work as well, perhaps because it continues the smoking habit. Also, it is possible to fool yourself by inhaling more while smoking fewer cigarettes. This results in the same amount of nicotine in your body!    GET SUPPORT:  Support programs can make an important difference, especially for the heavy smoker. These groups offer lectures, methods to change your behavior and peer support. Call the free national Quitline for more information. 800-QUIT-NOW (919-904-8862). Low-cost or free programs are offered by many hospitals, local chapters of the American Lung Association (988-859-9712) and the American Cancer Society (592-519-1282). Support at home is important too. Non-smokers can help by offering praise and encouragement. If the smoker fails to quit, encourage them to try again!    OVER-THE-COUNTER MEDICINES:  For those who can't quit on their own, Nicotine Replacement Therapy (NRT) may make quitting much easier. Certain aids such as  the nicotine patch, gum and lozenge are available without a prescription. However, it is best to use these under the guidance of your doctor. The skin patch provides a steady supply of nicotine to the body. Nicotine gum and lozenge gives temporary bursts of low levels of nicotine. Both methods take the edge off the craving for cigarettes. WARNING: If you feel symptoms of nicotine overdose, such as nausea, vomiting, dizziness, weakness, or fast heartbeat, stop using these and see your doctor.    PRESCRIPTION MEDICINES:  After evaluating your smoking patterns and prior attempts at quitting, your doctor may offer a prescription medicine such as bupropion (Zyban, Wellbutrin), varenicline (Chantix, Champix), a niocotine inhaler or nasal spray. Each has its unique advantage and side effects which your doctor can review with you.    HEALTH BENEFITS OF QUITTING:  The benefits of quitting start right away and keep improving the longer you go without smokin minutes: blood pressure and pulse return to normal  8 hours: oxygen levels return to normal  2 days: ability to smell and taste begins to improve as damaged nerves start to regrow  2-3 weeks: circulation and lung function improves  1-9 months: decreased cough, congestion and shortness of breath; less tired  1 year: risk of heart attack decreases by half  5 years: risk of lung cancer decreases by half; risk of stroke becomes the same as a non-smoker  For information about how to quit smoking, visit the following links:  [employer] [place] ,   Clearing the Air, Quit Smoking Today   - an online booklet. http://www.smokefree.gov/pubs/clearing_the_air.pdf  Smokefree.gov http://smokefree.gov/  QuitNet http://www.quitnet.com/    9607-3659 [employer], [address] [place] [address], [place] [address]. All rights reserved. This information is not intended as a substitute for professional medical care. Always follow your healthcare professional's instructions.    The  Benefits of Living Smoke Free  What do you want to gain from quitting? Check off some reasons to quit.  Health Benefits  ___ Reduce my risk of lung cancer, heart disease, chronic lung disease  ___ Have fewer wrinkles and softer skin  ___ Improve my sense of taste and smell  ___ For pregnant women reduce the risk of having a miscarriage, stillbirth, premature birth, or low-birth-weight baby  Personal Benefits  ___ Feel more in control of my life  ___ Have better-smelling hair, breath, clothes, home, and car  ___ Save time by not having to take smoke breaks, buy cigarettes, or hunt for a light  ___ Have whiter teeth  Family Benefits  ___ Reduce my children s respiratory tract infections  ___ Set a good example for my children  ___ Reduce my family s cancer risk  Financial Benefits  ___ Save hundreds of dollars each year that would be spent on cigarettes  ___ Save money on medical bills  ___ Save on life, health, and car insurance premiums    Those Dollars Add Up!  Cigarettes are expensive, and getting more expensive all the time. Do you realize how much money you are spending on cigarettes per year? What is the average amount you spend on a pack of cigarettes? What is the average number of packs that you smoke per day? Using your answers to these questions, fill in this formula to help you find out:  ($ _____ per pack) ×  ( _____ number of packs per day) × (365 days) =  $ _____ yearly cost of smoking  Besides tobacco, there are other costs, including extra cleaning bills and replacement costs for clothing and furniture; medical expenses for smoking-related illnesses; and higher health, life, and car insurance premiums.    Cigars and Pipes Count Too!  Cigars and pipes are also dangerous. So are smokeless (chewing) tobacco and snuff. All of these products contain nicotine, a highly addictive substance that has harmful effects on your body. Quitting smoking means giving up all tobacco products.      0117-1500 [employer], [address] [place] [address], [place] [address]. All rights reserved. This information is not intended as a substitute for professional medical care. Always follow your healthcare professional's instructions.

## 2017-10-04 NOTE — MR AVS SNAPSHOT
After Visit Summary   10/4/2017    Raquel Sanchez    MRN: [id number]           Patient Information     Date Of Birth          1950        Visit Information        Provider Department      10/4/2017 10:20 AM Shayy Olivas APRN CNP [facility]        Today's Diagnoses     Skin tear of forearm without complication, right, initial encounter    -  1    Atopic dermatitis, unspecified type        Encounter for smoking cessation counseling          Care Instructions      Abrasions  Abrasions are skin scrapes. Their treatment depends on how large and deep the abrasion is.  Home care  You may be prescribed an antibiotic cream or ointment to apply to the wound. This helps prevent infection. Follow instructions when using this medicine.  General care    To care for the abrasion, do the following each day for as long as directed by your healthcare provider.    If you were given a bandage, change it once a day. If your bandage sticks to the wound, soak it in warm water until it loosens.    Wash the area with soap and warm water. You may do this in a sink or under a tub faucet or shower. Rinse off the soap. Then pat the area dry with a clean towel.    If antibiotic ointment or cream was prescribed, reapply it to the wound as directed. Cover the wound with a fresh nonstick bandage. If the bandage becomes wet or dirty, change it as soon as possible.    Some antibiotic ointments or cream can cause an allergic reaction or dermatitis. This may cause redness, itching and or hives. If this occurs, stop using the ointment immediately and wash off any remaining ointment. You may need to take some allergy medicine to relieve symptoms.    You may use acetaminophen or ibuprofen to control pain unless another pain medicine was prescribed. Talk with your healthcare provider before using these medicines if you have chronic liver or kidney disease or ever had a stomach ulcer or GI bleeding. Don t use  ibuprofen in children younger than six months old.    Most skin wounds heal within 10 days. But an infection may occur even with treatment. So it s important to watch the wound for signs of infection as listed below.  Follow-up care  Follow up with your healthcare provider, or as advised.  When to seek medical advice  Call your healthcare provider right away if any of these occur:    Fever of 100.4 F (38 C) or higher, or as directed by your healthcare provider    Increasing pain, redness, swelling, or drainage from the wound    Bleeding from the wound that does not stop after a few minutes of steady, firm pressure    Decreased ability to move any body part near the wound  Date Last Reviewed: 3/3/2017    2915-9563 [employer]. [address] [place] [address], [place] [address]. All rights reserved. This information is not intended as a substitute for professional medical care. Always follow your healthcare professional's instructions.        HOW TO QUIT SMOKING  Smoking is one of the hardest habits to break. About half of all those who have ever smoked have been able to quit, and most of those (about 70%) who still smoke want to quit. Here are some of the best ways to stop smoking.     KEEP TRYING:  It takes most smokers about 8 tries before they are finally able to fully quit. So, the more often you try and fail, the better your chance of quitting the next time! So, don't give up!    GO COLD TURKEY:  Most ex-smokers quit cold turkey. Trying to cut back gradually doesn't seem to work as well, perhaps because it continues the smoking habit. Also, it is possible to fool yourself by inhaling more while smoking fewer cigarettes. This results in the same amount of nicotine in your body!    GET SUPPORT:  Support programs can make an important difference, especially for the heavy smoker. These groups offer lectures, methods to change your behavior and peer support. Call the free national Quitline for more information.  669-TOPG-IWZ (317-382-2471). Low-cost or free programs are offered by many hospitals, local chapters of the American Lung Association (691-319-5796) and the American Cancer Society (822-014-6259). Support at home is important too. Non-smokers can help by offering praise and encouragement. If the smoker fails to quit, encourage them to try again!    OVER-THE-COUNTER MEDICINES:  For those who can't quit on their own, Nicotine Replacement Therapy (NRT) may make quitting much easier. Certain aids such as the nicotine patch, gum and lozenge are available without a prescription. However, it is best to use these under the guidance of your doctor. The skin patch provides a steady supply of nicotine to the body. Nicotine gum and lozenge gives temporary bursts of low levels of nicotine. Both methods take the edge off the craving for cigarettes. WARNING: If you feel symptoms of nicotine overdose, such as nausea, vomiting, dizziness, weakness, or fast heartbeat, stop using these and see your doctor.    PRESCRIPTION MEDICINES:  After evaluating your smoking patterns and prior attempts at quitting, your doctor may offer a prescription medicine such as bupropion (Zyban, Wellbutrin), varenicline (Chantix, Champix), a niocotine inhaler or nasal spray. Each has its unique advantage and side effects which your doctor can review with you.    HEALTH BENEFITS OF QUITTING:  The benefits of quitting start right away and keep improving the longer you go without smokin minutes: blood pressure and pulse return to normal  8 hours: oxygen levels return to normal  2 days: ability to smell and taste begins to improve as damaged nerves start to regrow  2-3 weeks: circulation and lung function improves  1-9 months: decreased cough, congestion and shortness of breath; less tired  1 year: risk of heart attack decreases by half  5 years: risk of lung cancer decreases by half; risk of stroke becomes the same as a non-smoker  For information about  how to quit smoking, visit the following links:  National Cancer [place] ,   Clearing the Air, Quit Smoking Today   - an online booklet. http://www.smokefree.gov/pubs/clearing_the_air.pdf  Smokefree.gov http://smokefree.gov/  QuitNet http://www.quitnet.com/    1420-5010 [employer], [address] [place] [address], [place] [address]. All rights reserved. This information is not intended as a substitute for professional medical care. Always follow your healthcare professional's instructions.    The Benefits of Living Smoke Free  What do you want to gain from quitting? Check off some reasons to quit.  Health Benefits  ___ Reduce my risk of lung cancer, heart disease, chronic lung disease  ___ Have fewer wrinkles and softer skin  ___ Improve my sense of taste and smell  ___ For pregnant women--reduce the risk of having a miscarriage, stillbirth, premature birth, or low-birth-weight baby  Personal Benefits  ___ Feel more in control of my life  ___ Have better-smelling hair, breath, clothes, home, and car  ___ Save time by not having to take smoke breaks, buy cigarettes, or hunt for a light  ___ Have whiter teeth  Family Benefits  ___ Reduce my children s respiratory tract infections  ___ Set a good example for my children  ___ Reduce my family s cancer risk  Financial Benefits  ___ Save hundreds of dollars each year that would be spent on cigarettes  ___ Save money on medical bills  ___ Save on life, health, and car insurance premiums    Those Dollars Add Up!  Cigarettes are expensive, and getting more expensive all the time. Do you realize how much money you are spending on cigarettes per year? What is the average amount you spend on a pack of cigarettes? What is the average number of packs that you smoke per day? Using your answers to these questions, fill in this formula to help you find out:  ($ _____ per pack) ×  ( _____ number of packs per day) × (365 days) =  $ _____ yearly cost of smoking  Besides tobacco, there  "are other costs, including extra cleaning bills and replacement costs for clothing and furniture; medical expenses for smoking-related illnesses; and higher health, life, and car insurance premiums.    Cigars and Pipes Count Too!  Cigars and pipes are also dangerous. So are smokeless (chewing) tobacco and snuff. All of these products contain nicotine, a highly addictive substance that has harmful effects on your body. Quitting smoking means giving up all tobacco products.      6957-6166 [employer], [address] [place] [address], [place] [address]. All rights reserved. This information is not intended as a substitute for professional medical care. Always follow your healthcare professional's instructions.          Follow-ups after your visit        Who to contact     If you have questions or need follow up information about today's clinic visit or your schedule please contact [facility] directly at [phone].  Normal or non-critical lab and imaging results will be communicated to you by MyChart, letter or phone within 4 business days after the clinic has received the results. If you do not hear from us within 7 days, please contact the clinic through [employer] or phone. If you have a critical or abnormal lab result, we will notify you by phone as soon as possible.  Submit refill requests through [email or web address] or call your pharmacy and they will forward the refill request to us. Please allow 3 business days for your refill to be completed.          Additional Information About Your Visit        [employer] Information     [email or web address] lets you send messages to your doctor, view your test results, renew your prescriptions, schedule appointments and more. To sign up, go to [email or web address] . Click on \"Log in\" on the left side of the screen, which will take you to the Welcome page. Then click on \"Sign up Now\" on the right side of the page.     You will be asked to enter the access code listed below, as well as " "some personal information. Please follow the directions to create your username and password.     Your access code is: [email or web address]  Expires: 2018 10:56 AM     Your access code will  in 90 days. If you need help or a new code, please call your [facility] [place] [facility] clinic or [phone].        Care EveryWhere ID     This is your Care EveryWhere ID. This could be used by other organizations to access your [facility] [place] [facility] medical records  [id number]        Your Vitals Were     Pulse Temperature Height Pulse Oximetry BMI (Body Mass Index)       75 97.1  F (36.2  C) 5' 2.5\" (1.588 m) 98% 23.4 kg/m2        Blood Pressure from Last 3 Encounters:   10/04/17 114/82   17 134/80   17 144/88    Weight from Last 3 Encounters:   10/04/17 130 lb (59 kg)   17 131 lb 9.6 oz (59.7 kg)   17 130 lb 6.4 oz (59.1 kg)              We Performed the Following     Tobacco Cessation - for Health Maintenance          Today's Medication Changes          These changes are accurate as of: 10/4/17 10:59 AM.  If you have any questions, ask your nurse or doctor.               These medicines have changed or have updated prescriptions.        Dose/Directions    citalopram 40 MG tablet   Commonly known as:  celeXA   This may have changed:  Another medication with the same name was removed. Continue taking this medication, and follow the directions you see here.   Used for:  Dysthymia   Changed by:  Pelon David MD        TAKE 1 TABLET DAILY BY MOUTH   Quantity:  30 tablet   Refills:  0       * triamcinolone 0.025 % cream   Commonly known as:  KENALOG   This may have changed:  Another medication with the same name was added. Make sure you understand how and when to take each.   Used for:  Lichen simplex   Changed by:  AIMEE Briseno MD        Apply once a day for itch or redness   Quantity:  80 g   Refills:  3       * triamcinolone 0.5 % cream   Commonly known as:  KENALOG   This may have changed:  Another medication with " the same name was added. Make sure you understand how and when to take each.   Used for:  Dermatitis   Changed by:  Pelon David MD        APPLY SPARINGLY TO AFFECTED AREA THREE TIMES DAILY.   Quantity:  30 g   Refills:  2       * triamcinolone 0.1 % cream   Commonly known as:  KENALOG   This may have changed:  You were already taking a medication with the same name, and this prescription was added. Make sure you understand how and when to take each.   Used for:  Atopic dermatitis, unspecified type   Changed by:  Shayy Olivas APRN CNP        Apply sparingly to affected area three times daily as needed   Quantity:  80 g   Refills:  0       * Notice:  This list has 3 medication(s) that are the same as other medications prescribed for you. Read the directions carefully, and ask your doctor or other care provider to review them with you.         Where to get your medicines      These medications were sent to [id number] - [place], [place] - [address]  [address], [place] [address]     Phone:  [phone]     triamcinolone 0.1 % cream                Primary Care Provider Office Phone # Fax #    Pelon David [phone]       [facility] [address]  [facility] [address]        Equal Access to Services     ROHITH BHAT AH: Hadii aad ku hadasho Soomaali, waaxda luqadaha, qaybta kaalmada adeegyada, waxay idiin hayaan fifi alfonso. So [facility] [phone].    ATENCIÓN: Si habla español, tiene a pathak disposición servicios gratuitos de asistencia lingüística. Llame al [phone].    We comply with applicable federal civil rights laws and [place] laws. We do not discriminate on the basis of race, color, national origin, age, disability, sex, sexual orientation, or gender identity.            Thank you!     Thank you for choosing [facility]  for your care. Our goal is always to provide you with excellent care. Hearing back from our  patients is one way we can continue to improve our services. Please take a few minutes to complete the written survey that you may receive in the mail after your visit with us. Thank you!             Your Updated Medication List - Protect others around you: Learn how to safely use, store and throw away your medicines at www.disposemymeds.org.          This list is accurate as of: 10/4/17 10:59 AM.  Always use your most recent med list.                   Brand Name Dispense Instructions for use Diagnosis    citalopram 40 MG tablet    celeXA    30 tablet    TAKE 1 TABLET DAILY BY MOUTH    Dysthymia       clonazePAM 0.5 MG tablet    klonoPIN    60 tablet    1 tab bid prn    Anxiety       HYDROcodone-acetaminophen 5-325 MG per tablet    NORCO    60 tablet    Take 1 tablet by mouth every 6 hours as needed for moderate to severe pain    Rash and nonspecific skin eruption       hydrOXYzine 25 MG capsule    VISTARIL    120 capsule    Take 1 capsule (25 mg) by mouth 3 times daily as needed for itching    Rash       MULTIVITAMIN ADULTS 50+ Tabs      Take 1 tablet by mouth daily        * triamcinolone 0.025 % cream    KENALOG    80 g    Apply once a day for itch or redness    Lichen simplex       * triamcinolone 0.5 % cream    KENALOG    30 g    APPLY SPARINGLY TO AFFECTED AREA THREE TIMES DAILY.    Dermatitis       * triamcinolone 0.1 % cream    KENALOG    80 g    Apply sparingly to affected area three times daily as needed    Atopic dermatitis, unspecified type       * Notice:  This list has 3 medication(s) that are the same as other medications prescribed for you. Read the directions carefully, and ask your doctor or other care provider to review them with you.

## 2017-10-05 ASSESSMENT — ANXIETY QUESTIONNAIRES: GAD7 TOTAL SCORE: 5

## 2017-10-09 ENCOUNTER — OFFICE VISIT (OUTPATIENT)
Dept: FAMILY MEDICINE | Facility: OTHER | Age: 67
End: 2017-10-09
Attending: FAMILY MEDICINE
Payer: MEDICARE

## 2017-10-09 ENCOUNTER — TELEPHONE (OUTPATIENT)
Dept: FAMILY MEDICINE | Facility: OTHER | Age: 67
End: 2017-10-09

## 2017-10-09 VITALS
SYSTOLIC BLOOD PRESSURE: 126 MMHG | WEIGHT: 130 LBS | DIASTOLIC BLOOD PRESSURE: 80 MMHG | TEMPERATURE: 97.7 F | OXYGEN SATURATION: 94 % | HEIGHT: 63 IN | BODY MASS INDEX: 23.04 KG/M2 | HEART RATE: 98 BPM

## 2017-10-09 DIAGNOSIS — L08.9 INFECTED SKIN TEAR: Primary | ICD-10-CM

## 2017-10-09 DIAGNOSIS — T14.8XXA INFECTED SKIN TEAR: Primary | ICD-10-CM

## 2017-10-09 LAB
BASOPHILS # BLD AUTO: 0.1 10E9/L (ref 0–0.2)
BASOPHILS NFR BLD AUTO: 0.6 %
DIFFERENTIAL METHOD BLD: NORMAL
EOSINOPHIL # BLD AUTO: 0.2 10E9/L (ref 0–0.7)
EOSINOPHIL NFR BLD AUTO: 2.8 %
ERYTHROCYTE [DISTWIDTH] IN BLOOD BY AUTOMATED COUNT: 13.2 % (ref 10–15)
HCT VFR BLD AUTO: 42.6 % (ref 35–47)
HGB BLD-MCNC: 14.8 G/DL (ref 11.7–15.7)
LYMPHOCYTES # BLD AUTO: 2.4 10E9/L (ref 0.8–5.3)
LYMPHOCYTES NFR BLD AUTO: 31.3 %
MCH RBC QN AUTO: 32.5 PG (ref 26.5–33)
MCHC RBC AUTO-ENTMCNC: 34.7 G/DL (ref 31.5–36.5)
MCV RBC AUTO: 93 FL (ref 78–100)
MONOCYTES # BLD AUTO: 0.7 10E9/L (ref 0–1.3)
MONOCYTES NFR BLD AUTO: 8.4 %
NEUTROPHILS # BLD AUTO: 4.4 10E9/L (ref 1.6–8.3)
NEUTROPHILS NFR BLD AUTO: 56.9 %
PLATELET # BLD AUTO: 347 10E9/L (ref 150–450)
RBC # BLD AUTO: 4.56 10E12/L (ref 3.8–5.2)
WBC # BLD AUTO: 7.8 10E9/L (ref 4–11)

## 2017-10-09 PROCEDURE — 36415 COLL VENOUS BLD VENIPUNCTURE: CPT | Mod: ZL | Performed by: FAMILY MEDICINE

## 2017-10-09 PROCEDURE — 99212 OFFICE O/P EST SF 10 MIN: CPT

## 2017-10-09 PROCEDURE — 99213 OFFICE O/P EST LOW 20 MIN: CPT | Performed by: FAMILY MEDICINE

## 2017-10-09 PROCEDURE — 85025 COMPLETE CBC W/AUTO DIFF WBC: CPT | Mod: ZL | Performed by: FAMILY MEDICINE

## 2017-10-09 RX ORDER — CEPHALEXIN 500 MG/1
500 CAPSULE ORAL 3 TIMES DAILY
Qty: 21 CAPSULE | Refills: 0 | Status: SHIPPED | OUTPATIENT
Start: 2017-10-09 | End: 2017-11-29

## 2017-10-09 ASSESSMENT — PAIN SCALES - GENERAL: PAINLEVEL: MILD PAIN (3)

## 2017-10-09 ASSESSMENT — PATIENT HEALTH QUESTIONNAIRE - PHQ9: SUM OF ALL RESPONSES TO PHQ QUESTIONS 1-9: 4

## 2017-10-09 NOTE — MR AVS SNAPSHOT
"              After Visit Summary   10/9/2017    Raquel Sanchez    MRN: [id number]           Patient Information     Date Of Birth          1950        Visit Information        Provider Department      10/9/2017 10:00 AM Pelon David MD [facility]        Today's Diagnoses     Infected skin tear    -  1      Care Instructions    F/u with ongoing concerns.           Follow-ups after your visit        Who to contact     If you have questions or need follow up information about today's clinic visit or your schedule please contact [facility] directly at [phone].  Normal or non-critical lab and imaging results will be communicated to you by [employer], letter or phone within 4 business days after the clinic has received the results. If you do not hear from us within 7 days, please contact the clinic through [employer] or phone. If you have a critical or abnormal lab result, we will notify you by phone as soon as possible.  Submit refill requests through [employer] or call your pharmacy and they will forward the refill request to us. Please allow 3 business days for your refill to be completed.          Additional Information About Your Visit        MyChart Information     [employer] lets you send messages to your doctor, view your test results, renew your prescriptions, schedule appointments and more. To sign up, go to [email or web address] . Click on \"Log in\" on the left side of the screen, which will take you to the Welcome page. Then click on \"Sign up Now\" on the right side of the page.     You will be asked to enter the access code listed below, as well as some personal information. Please follow the directions to create your username and password.     Your access code is: [email or web address]  Expires: 2018 10:56 AM     Your access code will  in 90 days. If you need help or a new code, please call your [facility] or [phone].        Care EveryWhere ID     This is your " "Care EveryWhere ID. This could be used by other organizations to access your [facility] medical records  [id number]        Your Vitals Were     Pulse Temperature Height Pulse Oximetry BMI (Body Mass Index)       98 97.7  F (36.5  C) (Tympanic) 5' 2.5\" (1.588 m) 94% 23.4 kg/m2        Blood Pressure from Last 3 Encounters:   10/09/17 126/80   10/04/17 114/82   04/19/17 134/80    Weight from Last 3 Encounters:   10/09/17 130 lb (59 kg)   10/04/17 130 lb (59 kg)   04/19/17 131 lb 9.6 oz (59.7 kg)              We Performed the Following     CBC with platelets and differential          Today's Medication Changes          These changes are accurate as of: 10/9/17 12:46 PM.  If you have any questions, ask your nurse or doctor.               Start taking these medicines.        Dose/Directions    cephALEXin 500 MG capsule   Commonly known as:  KEFLEX   Used for:  Infected skin tear   Started by:  Pelon David MD        Dose:  500 mg   Take 1 capsule (500 mg) by mouth 3 times daily   Quantity:  21 capsule   Refills:  0            Where to get your medicines      These medications were sent to [id number] - [place], [place] - [address]  [address] [place] [address]     Phone:  [phone]     cephALEXin 500 MG capsule                Primary Care Provider Office Phone # Fax #    Pelon David [phone]       [address]  [facility] [place] [address]        Equal Access to Services     [facility] AH: Hadii aad ku hadasho Soomaali, waaxda luqadaha, qaybta kaalmada adeegyada, waxay joanne alfonso. So [facility] [phone].    ATENCIÓN: Si habla español, tiene a pathak disposición servicios gratuitos de asistencia lingüística. Llame al [phone].    We comply with applicable federal civil rights laws and [place] laws. We do not discriminate on the basis of race, color, national origin, age, disability, sex, sexual orientation, or gender " identity.            Thank you!     Thank you for choosing [facility]  for your care. Our goal is always to provide you with excellent care. Hearing back from our patients is one way we can continue to improve our services. Please take a few minutes to complete the written survey that you may receive in the mail after your visit with us. Thank you!             Your Updated Medication List - Protect others around you: Learn how to safely use, store and throw away your medicines at www.disposemymeds.org.          This list is accurate as of: 10/9/17 12:46 PM.  Always use your most recent med list.                   Brand Name Dispense Instructions for use Diagnosis    cephALEXin 500 MG capsule    KEFLEX    21 capsule    Take 1 capsule (500 mg) by mouth 3 times daily    Infected skin tear       citalopram 40 MG tablet    celeXA    30 tablet    TAKE 1 TABLET DAILY BY MOUTH    Dysthymia       clonazePAM 0.5 MG tablet    klonoPIN    60 tablet    1 tab bid prn    Anxiety       HYDROcodone-acetaminophen 5-325 MG per tablet    NORCO    60 tablet    Take 1 tablet by mouth every 6 hours as needed for moderate to severe pain    Rash and nonspecific skin eruption       hydrOXYzine 25 MG capsule    VISTARIL    120 capsule    Take 1 capsule (25 mg) by mouth 3 times daily as needed for itching    Rash       MULTIVITAMIN ADULTS 50+ Tabs      Take 1 tablet by mouth daily        * triamcinolone 0.025 % cream    KENALOG    80 g    Apply once a day for itch or redness    Lichen simplex       * triamcinolone 0.5 % cream    KENALOG    30 g    APPLY SPARINGLY TO AFFECTED AREA THREE TIMES DAILY.    Dermatitis       * triamcinolone 0.1 % cream    KENALOG    80 g    Apply sparingly to affected area three times daily as needed    Atopic dermatitis, unspecified type       * Notice:  This list has 3 medication(s) that are the same as other medications prescribed for you. Read the directions carefully, and ask your doctor or other  care provider to review them with you.

## 2017-10-09 NOTE — NURSING NOTE
Dressing applied to right forearm as directed by Dr David. Non-stick adaptic, gauze and coban. Patient tolerated well. Alma Delia Gonzalez

## 2017-10-09 NOTE — PROGRESS NOTES
Raquel Sanchez    October 9, 2017    Chief Complaint   Patient presents with     Derm Problem     arm infection       SUBJECTIVE:  Here for a skin tear which has gotten infected.  Happened 6 days ago.  Some bruising as well.  Grocery bags slipped down her arm and tore the skin.      No past medical history on file.    Past Surgical History:   Procedure Laterality Date     GYN SURGERY      hysterectomy       Current Outpatient Prescriptions   Medication Sig Dispense Refill     cephALEXin (KEFLEX) 500 MG capsule Take 1 capsule (500 mg) by mouth 3 times daily 21 capsule 0     triamcinolone (KENALOG) 0.1 % cream Apply sparingly to affected area three times daily as needed 80 g 0     HYDROcodone-acetaminophen (NORCO) 5-325 MG per tablet Take 1 tablet by mouth every 6 hours as needed for moderate to severe pain 60 tablet 0     citalopram (CELEXA) 40 MG tablet TAKE 1 TABLET DAILY BY MOUTH 30 tablet 0     clonazePAM (KLONOPIN) 0.5 MG tablet 1 tab bid prn 60 tablet 5     triamcinolone (KENALOG) 0.5 % cream APPLY SPARINGLY TO AFFECTED AREA THREE TIMES DAILY. 30 g 2     triamcinolone (KENALOG) 0.025 % cream Apply once a day for itch or redness 80 g 3     Multiple Vitamins-Minerals (MULTIVITAMIN ADULTS 50+) TABS Take 1 tablet by mouth daily       hydrOXYzine (VISTARIL) 25 MG capsule Take 1 capsule (25 mg) by mouth 3 times daily as needed for itching 120 capsule 1       Allergies   Allergen Reactions     Tape [Adhesive Tape] Blisters     Poison Ivy Extract [Extract Of Poison Riddhi] Rash       Family History   Problem Relation Age of Onset     DIABETES No family hx of      Asthma No family hx of      Thyroid Disease No family hx of      Hypertension No family hx of      Hyperlipidemia No family hx of        Social History     Social History     Marital status:      Spouse name: N/A     Number of children: N/A     Years of education: N/A     Occupational History     Not on file.     Social History Main Topics     Smoking  status: Current Every Day Smoker     Packs/day: 1.00     Types: Cigarettes     Smokeless tobacco: Never Used     Alcohol use 0.0 oz/week     0 Standard drinks or equivalent per week      Comment: occasional     Drug use: No     Sexual activity: Not on file     Other Topics Concern     Parent/Sibling W/ Cabg, Mi Or Angioplasty Before 65f 55m? No      Service No     Blood Transfusions Yes     Permits if needed     Seat Belt Yes     Social History Narrative       5 point ROS negative except as noted above in HPI, including Gen., Resp., CV, GI &  system review.     OBJECTIVE:  B/P: 126/80, T: 97.7, P: 98, R: Data Unavailable    GENERAL APPEARANCE: Alert, no acute distress  Right forearm 2 cm skin tear with bruising around.  Very thin skin around the area.  Some erythema on the volar forearm as well with some warmth.   SKIN: no suspicious lesions or rashes to visualized skin  NEURO: Alert, oriented x 3, speech and mentation normal    ASSESSMENT and PLAN:  (T14.8XXA,  L08.9) Infected skin tear  (primary encounter diagnosis)  Comment: reviewed.   Plan: cephALEXin (KEFLEX) 500 MG capsule, CBC with         platelets and differential        Get baseline cbc.  Cover with keflex.  Redress.  F/u with ongoing concerns.

## 2017-10-09 NOTE — NURSING NOTE
"Chief Complaint   Patient presents with     Derm Problem     arm infection       Initial /80 (BP Location: Right arm, Cuff Size: Adult Regular)  Pulse 98  Temp 97.7  F (36.5  C) (Tympanic)  Ht 5' 2.5\" (1.588 m)  Wt 130 lb (59 kg)  SpO2 94%  BMI 23.4 kg/m2 Estimated body mass index is 23.4 kg/(m^2) as calculated from the following:    Height as of this encounter: 5' 2.5\" (1.588 m).    Weight as of this encounter: 130 lb (59 kg).  Medication Reconciliation: complete   Esther Herr LPN      "

## 2017-10-09 NOTE — TELEPHONE ENCOUNTER
8:40 AM    Reason for Call: OVERBOOK    Patient is having the following symptoms: Possible infection in arm, redness and drainage for 2 days.    The patient is requesting an appointment for overbook with Frankie.    Was an appointment offered for this call? Yes  If yes : Appointment type short              Date 10/10/2017 - with Dr. David, patient did not want to see Serg, but would like to get in today.    Preferred method for responding to this message: Telephone Call  What is your phone number ?    If we cannot reach you directly, may we leave a detailed response at the number you provided? Yes    Can this message wait until your PCP/provider returns, if unavailable today? Not applicable,     Jacy Saldivar

## 2017-10-17 DIAGNOSIS — F34.1 DYSTHYMIA: ICD-10-CM

## 2017-10-18 ENCOUNTER — TELEPHONE (OUTPATIENT)
Dept: FAMILY MEDICINE | Facility: OTHER | Age: 67
End: 2017-10-18

## 2017-10-18 DIAGNOSIS — R21 RASH AND NONSPECIFIC SKIN ERUPTION: ICD-10-CM

## 2017-10-18 RX ORDER — HYDROCODONE BITARTRATE AND ACETAMINOPHEN 5; 325 MG/1; MG/1
1 TABLET ORAL EVERY 6 HOURS PRN
Qty: 60 TABLET | Refills: 0 | Status: SHIPPED | OUTPATIENT
Start: 2017-10-18 | End: 2017-11-14

## 2017-10-18 NOTE — TELEPHONE ENCOUNTER
norco      Last Written Prescription Date: 9/20/17  Last Fill Quantity: 60,  # refills: 0   Last Office Visit with G, P or [facility] prescribing provider: 10/9/17

## 2017-10-18 NOTE — TELEPHONE ENCOUNTER
Celexa     Last Written Prescription Date: 9/14/17  Last Fill Quantity: 30, # refills: 0  Last Office Visit with [facility] primary care provider:  10/9/17        Last PHQ-9 score on record=   PHQ-9 SCORE 10/9/2017   Total Score -   Total Score 4

## 2017-10-18 NOTE — TELEPHONE ENCOUNTER
8:53 AM    Reason for Call: OVERBOOK    Patient is having the following symptoms: Arm still in pain and oozing.  She thinks it needs to be looked at    The patient is requesting an appointment for Today  with Dr. David    Was an appointment offered for this call?  No    Preferred method for responding to this message: [phone]    If we cannot reach you directly, may we leave a detailed response at the number you provided? Yes    Cheryl Taylor

## 2017-10-18 NOTE — TELEPHONE ENCOUNTER
Called offered appt with BEBE Jenkins Thursday 10/19/17 or Friday 10/20/17 Patient decided to wait or go in later if needed  Liseth Riggs

## 2017-10-19 RX ORDER — CITALOPRAM HYDROBROMIDE 40 MG/1
TABLET ORAL
Qty: 30 TABLET | Refills: 1 | Status: SHIPPED | OUTPATIENT
Start: 2017-10-19 | End: 2017-12-13

## 2017-11-14 DIAGNOSIS — R21 RASH AND NONSPECIFIC SKIN ERUPTION: ICD-10-CM

## 2017-11-14 RX ORDER — HYDROCODONE BITARTRATE AND ACETAMINOPHEN 5; 325 MG/1; MG/1
1 TABLET ORAL EVERY 6 HOURS PRN
Qty: 60 TABLET | Refills: 0 | Status: SHIPPED | OUTPATIENT
Start: 2017-11-14 | End: 2017-12-20

## 2017-11-14 NOTE — TELEPHONE ENCOUNTER
norco      Last Written Prescription Date: 10/18/17  Last Fill Quantity: 60,  # refills: 0   Last Office Visit with G, [facility] or [facility] prescribing provider: 10/9/17

## 2017-11-27 ENCOUNTER — TELEPHONE (OUTPATIENT)
Dept: FAMILY MEDICINE | Facility: OTHER | Age: 67
End: 2017-11-27

## 2017-11-27 NOTE — TELEPHONE ENCOUNTER
Called patient offered BEBE Jenkins patient declined. Will go to  if gets worse or call and schedule.  Liseth Riggs

## 2017-11-27 NOTE — TELEPHONE ENCOUNTER
11:14 AM    Reason for Call: OVERBOOK    Patient is having the following symptoms: Arm is giving her trouble again. It is bleeding and still sore and itchy    The patient is requesting an appointment for Todaywith   Dr. David    Was an appointment offered for this call?   No    Preferred method for responding to this message: [phone]    If we cannot reach you directly, may we leave a detailed response at the number you provided?  Yes    Cheryl Taylor

## 2017-11-29 ENCOUNTER — OFFICE VISIT (OUTPATIENT)
Dept: FAMILY MEDICINE | Facility: OTHER | Age: 67
End: 2017-11-29
Attending: FAMILY MEDICINE
Payer: MEDICARE

## 2017-11-29 VITALS
SYSTOLIC BLOOD PRESSURE: 120 MMHG | DIASTOLIC BLOOD PRESSURE: 82 MMHG | TEMPERATURE: 98.5 F | HEART RATE: 100 BPM | WEIGHT: 127 LBS | OXYGEN SATURATION: 96 % | BODY MASS INDEX: 22.5 KG/M2 | HEIGHT: 63 IN

## 2017-11-29 DIAGNOSIS — T14.8XXA NONHEALING NONSURGICAL WOUND LIMITED TO BREAKDOWN OF SKIN: Primary | ICD-10-CM

## 2017-11-29 DIAGNOSIS — Z11.59 NEED FOR HEPATITIS C SCREENING TEST: ICD-10-CM

## 2017-11-29 DIAGNOSIS — G89.29 OTHER CHRONIC PAIN: ICD-10-CM

## 2017-11-29 DIAGNOSIS — Z71.6 TOBACCO ABUSE COUNSELING: ICD-10-CM

## 2017-11-29 DIAGNOSIS — Z72.0 TOBACCO ABUSE: ICD-10-CM

## 2017-11-29 PROCEDURE — 80307 DRUG TEST PRSMV CHEM ANLYZR: CPT | Mod: ZL | Performed by: FAMILY MEDICINE

## 2017-11-29 PROCEDURE — G0472 HEP C SCREEN HIGH RISK/OTHER: HCPCS | Mod: ZL | Performed by: FAMILY MEDICINE

## 2017-11-29 PROCEDURE — 36415 COLL VENOUS BLD VENIPUNCTURE: CPT | Mod: ZL | Performed by: FAMILY MEDICINE

## 2017-11-29 PROCEDURE — 99214 OFFICE O/P EST MOD 30 MIN: CPT | Performed by: FAMILY MEDICINE

## 2017-11-29 PROCEDURE — 99212 OFFICE O/P EST SF 10 MIN: CPT

## 2017-11-29 ASSESSMENT — PAIN SCALES - GENERAL: PAINLEVEL: NO PAIN (0)

## 2017-11-29 ASSESSMENT — ANXIETY QUESTIONNAIRES
4. TROUBLE RELAXING: SEVERAL DAYS
GAD7 TOTAL SCORE: 5
7. FEELING AFRAID AS IF SOMETHING AWFUL MIGHT HAPPEN: NOT AT ALL
1. FEELING NERVOUS, ANXIOUS, OR ON EDGE: SEVERAL DAYS
5. BEING SO RESTLESS THAT IT IS HARD TO SIT STILL: SEVERAL DAYS
2. NOT BEING ABLE TO STOP OR CONTROL WORRYING: SEVERAL DAYS
3. WORRYING TOO MUCH ABOUT DIFFERENT THINGS: SEVERAL DAYS
6. BECOMING EASILY ANNOYED OR IRRITABLE: NOT AT ALL
IF YOU CHECKED OFF ANY PROBLEMS ON THIS QUESTIONNAIRE, HOW DIFFICULT HAVE THESE PROBLEMS MADE IT FOR YOU TO DO YOUR WORK, TAKE CARE OF THINGS AT HOME, OR GET ALONG WITH OTHER PEOPLE: SOMEWHAT DIFFICULT

## 2017-11-29 ASSESSMENT — PATIENT HEALTH QUESTIONNAIRE - PHQ9: SUM OF ALL RESPONSES TO PHQ QUESTIONS 1-9: 3

## 2017-11-29 NOTE — NURSING NOTE
"Chief Complaint   Patient presents with     RECHECK     Skin tear on right forearm, not getting better       Initial /82  Pulse 100  Temp 98.5  F (36.9  C)  Ht 5' 2.5\" (1.588 m)  Wt 127 lb (57.6 kg)  SpO2 96%  BMI 22.86 kg/m2 Estimated body mass index is 22.86 kg/(m^2) as calculated from the following:    Height as of this encounter: 5' 2.5\" (1.588 m).    Weight as of this encounter: 127 lb (57.6 kg).  Medication Reconciliation: complete   Nga Martinez    "

## 2017-11-29 NOTE — PROGRESS NOTES
Raquel Sanchez    November 29, 2017    Chief Complaint   Patient presents with     RECHECK     Skin tear on right forearm, not getting better       SUBJECTIVE:  Here for non healing wound.  She keeps bumping the forearm when doing 's cares.   In addition she is due for a UDS for her chronic pain and anxiety.  She hasn't been able to stop the pain meds or the klonopin and we are just trying to keep the dosing as low as possible.  She understands the risk of the combination and I keep talking to her about it and keep working with her all the same.  See below.      History reviewed. No pertinent past medical history.    Past Surgical History:   Procedure Laterality Date     GYN SURGERY      hysterectomy       Current Outpatient Prescriptions   Medication Sig Dispense Refill     HYDROcodone-acetaminophen (NORCO) 5-325 MG per tablet Take 1 tablet by mouth every 6 hours as needed for moderate to severe pain 60 tablet 0     citalopram (CELEXA) 40 MG tablet TAKE 1 TABLET DAILY BY MOUTH 30 tablet 1     triamcinolone (KENALOG) 0.1 % cream Apply sparingly to affected area three times daily as needed 80 g 0     clonazePAM (KLONOPIN) 0.5 MG tablet 1 tab bid prn 60 tablet 5     triamcinolone (KENALOG) 0.5 % cream APPLY SPARINGLY TO AFFECTED AREA THREE TIMES DAILY. 30 g 2     triamcinolone (KENALOG) 0.025 % cream Apply once a day for itch or redness 80 g 3     Multiple Vitamins-Minerals (MULTIVITAMIN ADULTS 50+) TABS Take 1 tablet by mouth daily       hydrOXYzine (VISTARIL) 25 MG capsule Take 1 capsule (25 mg) by mouth 3 times daily as needed for itching 120 capsule 1       Allergies   Allergen Reactions     Tape [Adhesive Tape] Blisters     Poison Ivy Extract [Extract Of Poison Riddhi] Rash       Family History   Problem Relation Age of Onset     DIABETES No family hx of      Asthma No family hx of      Thyroid Disease No family hx of      Hypertension No family hx of      Hyperlipidemia No family hx of        Social History      Social History     Marital status:      Spouse name: N/A     Number of children: N/A     Years of education: N/A     Occupational History     Not on file.     Social History Main Topics     Smoking status: Current Every Day Smoker     Packs/day: 1.00     Types: Cigarettes     Smokeless tobacco: Never Used     Alcohol use 0.0 oz/week     0 Standard drinks or equivalent per week      Comment: occasional     Drug use: No     Sexual activity: Not on file     Other Topics Concern     Parent/Sibling W/ Cabg, Mi Or Angioplasty Before 65f 55m? No      Service No     Blood Transfusions Yes     Permits if needed     Seat Belt Yes     Social History Narrative       5 point ROS negative except as noted above in HPI, including Gen., Resp., CV, GI &  system review.     OBJECTIVE:  B/P: 120/82, Temperature: 98.5, Pulse: 100, Respirations: Data Unavailable    GENERAL APPEARANCE: Alert, no acute distress  CV: regular rate and rhythm, no murmur, rub or gallop  RESP: lungs clear to auscultation bilaterally  ABDOMEN: normal bowel sounds, soft, nontender, no hepatosplenomegaly or other masses  Right forearm 2 1cm surface abrasions with underlying hematoma/bruising which is mild as well.    SKIN: no suspicious lesions or rashes to visualized skin  NEURO: Alert, oriented x 3, speech and mentation normal    ASSESSMENT and PLAN:  (T14.8XXA) Nonhealing nonsurgical wound limited to breakdown of skin  (primary encounter diagnosis)  Comment: reviewed.   Plan: doing wet to dry dressings and coban wrap.  Instructed on this.  She will do at home.      (Z11.59) Need for hepatitis C screening test  Comment: done.   Plan: Hepatitis C Screen Reflex to HCV RNA Quant and         Genotype        Done.     (Z72.0) Tobacco abuse  Comment: ongoing  Plan: Tobacco Cessation - Order to Satisfy Health         Maintenance        Advise cessation.     (Z71.6) Tobacco abuse counseling  Comment: as above.   Plan: as above.      (G89.29) Other  chronic pain  Comment: reviewed.    Plan: Pain Drug Scr UR W Rptd Meds        Update UDS.

## 2017-11-29 NOTE — PATIENT INSTRUCTIONS

## 2017-11-29 NOTE — MR AVS SNAPSHOT
After Visit Summary   11/29/2017    Raquel Sanchez    MRN: [id number]           Patient Information     Date Of Birth          1950        Visit Information        Provider Department      11/29/2017 2:15 PM Pelon David MD [facility]        Today's Diagnoses     Nonhealing nonsurgical wound limited to breakdown of skin    -  1    Need for hepatitis C screening test        Tobacco abuse        Tobacco abuse counseling        Other chronic pain          Care Instructions      HOW TO QUIT SMOKING  Smoking is one of the hardest habits to break. About half of all those who have ever smoked have been able to quit, and most of those (about 70%) who still smoke want to quit. Here are some of the best ways to stop smoking.     KEEP TRYING:  It takes most smokers about 8 tries before they are finally able to fully quit. So, the more often you try and fail, the better your chance of quitting the next time! So, don't give up!    GO COLD TURKEY:  Most ex-smokers quit cold turkey. Trying to cut back gradually doesn't seem to work as well, perhaps because it continues the smoking habit. Also, it is possible to fool yourself by inhaling more while smoking fewer cigarettes. This results in the same amount of nicotine in your body!    GET SUPPORT:  Support programs can make an important difference, especially for the heavy smoker. These groups offer lectures, methods to change your behavior and peer support. Call the free national Quitline for more information. 800-QUIT-NOW (875-539-5865). Low-cost or free programs are offered by many hospitals, local chapters of the American Lung Association (449-204-3265) and the American Cancer Society (617-216-9052). Support at home is important too. Non-smokers can help by offering praise and encouragement. If the smoker fails to quit, encourage them to try again!    OVER-THE-COUNTER MEDICINES:  For those who can't quit on their own, Nicotine Replacement  Therapy (NRT) may make quitting much easier. Certain aids such as the nicotine patch, gum and lozenge are available without a prescription. However, it is best to use these under the guidance of your doctor. The skin patch provides a steady supply of nicotine to the body. Nicotine gum and lozenge gives temporary bursts of low levels of nicotine. Both methods take the edge off the craving for cigarettes. WARNING: If you feel symptoms of nicotine overdose, such as nausea, vomiting, dizziness, weakness, or fast heartbeat, stop using these and see your doctor.    PRESCRIPTION MEDICINES:  After evaluating your smoking patterns and prior attempts at quitting, your doctor may offer a prescription medicine such as bupropion (Zyban, Wellbutrin), varenicline (Chantix, Champix), a niocotine inhaler or nasal spray. Each has its unique advantage and side effects which your doctor can review with you.    HEALTH BENEFITS OF QUITTING:  The benefits of quitting start right away and keep improving the longer you go without smokin minutes: blood pressure and pulse return to normal  8 hours: oxygen levels return to normal  2 days: ability to smell and taste begins to improve as damaged nerves start to regrow  2-3 weeks: circulation and lung function improves  1-9 months: decreased cough, congestion and shortness of breath; less tired  1 year: risk of heart attack decreases by half  5 years: risk of lung cancer decreases by half; risk of stroke becomes the same as a non-smoker  For information about how to quit smoking, visit the following links:  National Cancer [place] ,   Clearing the Air, Quit Smoking Today   - an online booklet. http://www.smokefree.gov/pubs/clearing_the_air.pdf  Smokefree.gov http://smokefree.gov/  QuitNet http://www.quitnet.com/    2313-9867 Salvador Alarcon, [address] [place] [address], [place] [address]. All rights reserved. This information is not intended as a substitute for professional medical care.  Always follow your healthcare professional's instructions.    The Benefits of Living Smoke Free  What do you want to gain from quitting? Check off some reasons to quit.  Health Benefits  ___ Reduce my risk of lung cancer, heart disease, chronic lung disease  ___ Have fewer wrinkles and softer skin  ___ Improve my sense of taste and smell  ___ For pregnant women--reduce the risk of having a miscarriage, stillbirth, premature birth, or low-birth-weight baby  Personal Benefits  ___ Feel more in control of my life  ___ Have better-smelling hair, breath, clothes, home, and car  ___ Save time by not having to take smoke breaks, buy cigarettes, or hunt for a light  ___ Have whiter teeth  Family Benefits  ___ Reduce my children s respiratory tract infections  ___ Set a good example for my children  ___ Reduce my family s cancer risk  Financial Benefits  ___ Save hundreds of dollars each year that would be spent on cigarettes  ___ Save money on medical bills  ___ Save on life, health, and car insurance premiums    Those Dollars Add Up!  Cigarettes are expensive, and getting more expensive all the time. Do you realize how much money you are spending on cigarettes per year? What is the average amount you spend on a pack of cigarettes? What is the average number of packs that you smoke per day? Using your answers to these questions, fill in this formula to help you find out:  ($ _____ per pack) ×  ( _____ number of packs per day) × (365 days) =  $ _____ yearly cost of smoking  Besides tobacco, there are other costs, including extra cleaning bills and replacement costs for clothing and furniture; medical expenses for smoking-related illnesses; and higher health, life, and car insurance premiums.    Cigars and Pipes Count Too!  Cigars and pipes are also dangerous. So are smokeless (chewing) tobacco and snuff. All of these products contain nicotine, a highly addictive substance that has harmful effects on your body. Quitting  "smoking means giving up all tobacco products.      0945-4730 Salvador Alarcon, [address] [place] [address], [place] [address]. All rights reserved. This information is not intended as a substitute for professional medical care. Always follow your healthcare professional's instructions.          Follow-ups after your visit        Who to contact     If you have questions or need follow up information about today's clinic visit or your schedule please contact [facility] directly at [phone].  Normal or non-critical lab and imaging results will be communicated to you by MyChart, letter or phone within 4 business days after the clinic has received the results. If you do not hear from us within 7 days, please contact the clinic through PLUMgridhart or phone. If you have a critical or abnormal lab result, we will notify you by phone as soon as possible.  Submit refill requests through Convozine or call your pharmacy and they will forward the refill request to us. Please allow 3 business days for your refill to be completed.          Additional Information About Your Visit        [facility] Center[facility] Information     Convozine lets you send messages to your doctor, view your test results, renew your prescriptions, schedule appointments and more. To sign up, go to [email or web address] . Click on \"Log in\" on the left side of the screen, which will take you to the Welcome page. Then click on \"Sign up Now\" on the right side of the page.     You will be asked to enter the access code listed below, as well as some personal information. Please follow the directions to create your username and password.     Your access code is: [email or web address]  Expires: 2018  9:56 AM     Your access code will  in 90 days. If you need help or a new code, please call your [facility] or [phone].        Care EveryWhere ID     This is your Care EveryWhere ID. This could be used by other organizations to access your [facility] medical " "records  [id number]        Your Vitals Were     Pulse Temperature Height Pulse Oximetry BMI (Body Mass Index)       100 98.5  F (36.9  C) 5' 2.5\" (1.588 m) 96% 22.86 kg/m2        Blood Pressure from Last 3 Encounters:   11/29/17 120/82   10/09/17 126/80   10/04/17 114/82    Weight from Last 3 Encounters:   11/29/17 127 lb (57.6 kg)   10/09/17 130 lb (59 kg)   10/04/17 130 lb (59 kg)              We Performed the Following     Hepatitis C Screen Reflex to HCV RNA Quant and Genotype     Pain Drug Scr UR W Rptd Meds     Tobacco Cessation - Order to Satisfy Health Maintenance        Primary Care Provider Office Phone # Fax #    Pelon David [phone]       [facility] [address] [facility] [address]  [facility] [address]        Equal Access to Services     ROHITH BHAT : Hadii aad ku hadasho Soomaali, waaxda luqadaha, qaybta kaalmada adeegyada, waxay idiin hayaan adeeg kharash la'aan . So [facility] [phone].    ATENCIÓN: Si habla español, tiene a pathak disposición servicios gratuitos de asistencia lingüística. Llame al [phone].    We comply with applicable federal civil rights laws and [place] laws. We do not discriminate on the basis of race, color, national origin, age, disability, sex, sexual orientation, or gender identity.            Thank you!     Thank you for choosing [facility]  for your care. Our goal is always to provide you with excellent care. Hearing back from our patients is one way we can continue to improve our services. Please take a few minutes to complete the written survey that you may receive in the mail after your visit with us. Thank you!             Your Updated Medication List - Protect others around you: Learn how to safely use, store and throw away your medicines at www.disposemymeds.org.          This list is accurate as of: 11/29/17  2:25 PM.  Always use your most recent med list.                   Brand Name Dispense Instructions for use Diagnosis    " citalopram 40 MG tablet    celeXA    30 tablet    TAKE 1 TABLET DAILY BY MOUTH    Dysthymia       clonazePAM 0.5 MG tablet    klonoPIN    60 tablet    1 tab bid prn    Anxiety       HYDROcodone-acetaminophen 5-325 MG per tablet    NORCO    60 tablet    Take 1 tablet by mouth every 6 hours as needed for moderate to severe pain    Rash and nonspecific skin eruption       hydrOXYzine 25 MG capsule    VISTARIL    120 capsule    Take 1 capsule (25 mg) by mouth 3 times daily as needed for itching    Rash       MULTIVITAMIN ADULTS 50+ Tabs      Take 1 tablet by mouth daily        * triamcinolone 0.025 % cream    KENALOG    80 g    Apply once a day for itch or redness    Lichen simplex       * triamcinolone 0.5 % cream    KENALOG    30 g    APPLY SPARINGLY TO AFFECTED AREA THREE TIMES DAILY.    Dermatitis       * triamcinolone 0.1 % cream    KENALOG    80 g    Apply sparingly to affected area three times daily as needed    Atopic dermatitis, unspecified type       * Notice:  This list has 3 medication(s) that are the same as other medications prescribed for you. Read the directions carefully, and ask your doctor or other care provider to review them with you.

## 2017-11-30 ASSESSMENT — ANXIETY QUESTIONNAIRES: GAD7 TOTAL SCORE: 5

## 2017-12-01 LAB — HCV AB SERPL QL IA: NONREACTIVE

## 2017-12-06 LAB — PAIN DRUG SCR UR W RPTD MEDS: NORMAL

## 2017-12-13 DIAGNOSIS — F34.1 DYSTHYMIA: ICD-10-CM

## 2017-12-15 RX ORDER — CITALOPRAM HYDROBROMIDE 40 MG/1
TABLET ORAL
Qty: 30 TABLET | Refills: 4 | Status: SHIPPED | OUTPATIENT
Start: 2017-12-15 | End: 2018-05-30

## 2017-12-20 DIAGNOSIS — R21 RASH AND NONSPECIFIC SKIN ERUPTION: ICD-10-CM

## 2017-12-20 RX ORDER — HYDROCODONE BITARTRATE AND ACETAMINOPHEN 5; 325 MG/1; MG/1
1 TABLET ORAL EVERY 6 HOURS PRN
Qty: 60 TABLET | Refills: 0 | Status: SHIPPED | OUTPATIENT
Start: 2017-12-20 | End: 2018-01-17

## 2017-12-20 NOTE — TELEPHONE ENCOUNTER
norco      Last Written Prescription Date: 11/14/17  Last Fill Quantity: 60,  # refills: 0   Last Office Visit with [facility], [facility] or [facility] prescribing provider: 11/29/17

## 2017-12-28 DIAGNOSIS — L30.9 DERMATITIS: ICD-10-CM

## 2017-12-28 NOTE — TELEPHONE ENCOUNTER
triamcinolone (KENALOG) 0.5 % cream     Last Written Prescription Date: 6/19/17  Last Fill Quantity: 30g,  # refills: 2   Last Office Visit with [facility], [facility] or [facility] prescribing provider: 11/29/17

## 2018-01-02 RX ORDER — TRIAMCINOLONE ACETONIDE 5 MG/G
CREAM TOPICAL
Qty: 30 G | Refills: 2 | Status: SHIPPED | OUTPATIENT
Start: 2018-01-02 | End: 2018-10-29

## 2018-01-05 ENCOUNTER — TELEPHONE (OUTPATIENT)
Dept: FAMILY MEDICINE | Facility: OTHER | Age: 68
End: 2018-01-05

## 2018-01-05 NOTE — TELEPHONE ENCOUNTER
Pharmacy called on 01/04/18 for refill of the pt's Klonopin. They state they had requested this 3 days prior and have not gotten a response. No refill request is entered. Refill requests received and were faxed to the refill dept. Another refill request was received today and still has not been entered into Epic. The pt's last fill by this office was 07/19/17 # 60 with 5 refills. She should have 2 weeks of medication left. She has been filling a few days early every month. Pt's last 2 UDS's were negative for Klonopin. When Rx'd pt was told to use minimally. Please advise what you would like to do. Do you want to see her to discuss/repeat UDS?

## 2018-01-08 DIAGNOSIS — F41.9 ANXIETY: ICD-10-CM

## 2018-01-08 RX ORDER — CLONAZEPAM 0.5 MG/1
TABLET ORAL
Qty: 60 TABLET | Refills: 5 | OUTPATIENT
Start: 2018-01-08

## 2018-01-09 NOTE — TELEPHONE ENCOUNTER
I called the pt and notified. Please schedule her fort tomorrow 01/10/18 at 8:30am, arriving at 8:15 am. Pt is aware.

## 2018-01-10 ENCOUNTER — OFFICE VISIT (OUTPATIENT)
Dept: FAMILY MEDICINE | Facility: OTHER | Age: 68
End: 2018-01-10
Attending: FAMILY MEDICINE
Payer: MEDICARE

## 2018-01-10 VITALS
TEMPERATURE: 98.2 F | DIASTOLIC BLOOD PRESSURE: 76 MMHG | RESPIRATION RATE: 16 BRPM | WEIGHT: 126.2 LBS | HEART RATE: 91 BPM | BODY MASS INDEX: 22.36 KG/M2 | OXYGEN SATURATION: 94 % | SYSTOLIC BLOOD PRESSURE: 136 MMHG | HEIGHT: 63 IN

## 2018-01-10 DIAGNOSIS — F41.9 ANXIETY: Primary | ICD-10-CM

## 2018-01-10 DIAGNOSIS — Z71.6 TOBACCO ABUSE COUNSELING: ICD-10-CM

## 2018-01-10 DIAGNOSIS — Z79.899 CONTROLLED SUBSTANCE AGREEMENT SIGNED: ICD-10-CM

## 2018-01-10 DIAGNOSIS — Z72.0 TOBACCO ABUSE: ICD-10-CM

## 2018-01-10 PROBLEM — G89.4 CHRONIC PAIN SYNDROME: Chronic | Status: ACTIVE | Noted: 2018-01-10

## 2018-01-10 PROCEDURE — 99213 OFFICE O/P EST LOW 20 MIN: CPT | Performed by: FAMILY MEDICINE

## 2018-01-10 PROCEDURE — G0463 HOSPITAL OUTPT CLINIC VISIT: HCPCS

## 2018-01-10 RX ORDER — CLONAZEPAM 0.5 MG/1
TABLET ORAL
Qty: 60 TABLET | Refills: 5 | Status: SHIPPED | OUTPATIENT
Start: 2018-01-10 | End: 2018-06-29

## 2018-01-10 ASSESSMENT — ANXIETY QUESTIONNAIRES
3. WORRYING TOO MUCH ABOUT DIFFERENT THINGS: NEARLY EVERY DAY
1. FEELING NERVOUS, ANXIOUS, OR ON EDGE: NEARLY EVERY DAY
IF YOU CHECKED OFF ANY PROBLEMS ON THIS QUESTIONNAIRE, HOW DIFFICULT HAVE THESE PROBLEMS MADE IT FOR YOU TO DO YOUR WORK, TAKE CARE OF THINGS AT HOME, OR GET ALONG WITH OTHER PEOPLE: NOT DIFFICULT AT ALL
7. FEELING AFRAID AS IF SOMETHING AWFUL MIGHT HAPPEN: NOT AT ALL
GAD7 TOTAL SCORE: 16
6. BECOMING EASILY ANNOYED OR IRRITABLE: NEARLY EVERY DAY
2. NOT BEING ABLE TO STOP OR CONTROL WORRYING: NEARLY EVERY DAY
5. BEING SO RESTLESS THAT IT IS HARD TO SIT STILL: NEARLY EVERY DAY

## 2018-01-10 ASSESSMENT — PATIENT HEALTH QUESTIONNAIRE - PHQ9
5. POOR APPETITE OR OVEREATING: SEVERAL DAYS
SUM OF ALL RESPONSES TO PHQ QUESTIONS 1-9: 5

## 2018-01-10 ASSESSMENT — PAIN SCALES - GENERAL: PAINLEVEL: MILD PAIN (3)

## 2018-01-10 NOTE — PATIENT INSTRUCTIONS

## 2018-01-10 NOTE — PROGRESS NOTES
Raquel Sanchez    January 10, 2018    Chief Complaint   Patient presents with     Recheck Medication     Pt is in to discuss her refill of Klonopin.       SUBJECTIVE:  Here for f/u.  Were questions on her klonopin and if it was early.  She reports she never takes it more than twice daily.  It was delayed this time, and she is having some mild w/d sx and would like it filled.  She reports the discrepancy is due to her calling early each month which she is told to do to ensure an on time refill.  Either way, I refilled today and discussed.  Also, her UDS is appropriate given the fact that her klonopin is low dose and this has been looked into and klonopin at this dose doesn't show up on our screen.      History reviewed. No pertinent past medical history.    Past Surgical History:   Procedure Laterality Date     GYN SURGERY      hysterectomy       Current Outpatient Prescriptions   Medication Sig Dispense Refill     clonazePAM (KLONOPIN) 0.5 MG tablet 1 tab bid prn 60 tablet 5     triamcinolone (KENALOG) 0.5 % cream APPLY SPARINGLY TO AFFECTED AREA THREE TIMES DAILY. 30 g 2     HYDROcodone-acetaminophen (NORCO) 5-325 MG per tablet Take 1 tablet by mouth every 6 hours as needed for moderate to severe pain 60 tablet 0     citalopram (CELEXA) 40 MG tablet TAKE 1 TABLET DAILY BY MOUTH 30 tablet 4     Multiple Vitamins-Minerals (MULTIVITAMIN ADULTS 50+) TABS Take 1 tablet by mouth daily       hydrOXYzine (VISTARIL) 25 MG capsule Take 1 capsule (25 mg) by mouth 3 times daily as needed for itching 120 capsule 1     [DISCONTINUED] clonazePAM (KLONOPIN) 0.5 MG tablet 1 tab bid prn 60 tablet 5       Allergies   Allergen Reactions     Tape [Adhesive Tape] Blisters     Poison Ivy Extract [Extract Of Poison Riddhi] Rash       Family History   Problem Relation Age of Onset     DIABETES No family hx of      Asthma No family hx of      Thyroid Disease No family hx of      Hypertension No family hx of      Hyperlipidemia No family hx of         Social History     Social History     Marital status:      Spouse name: N/A     Number of children: N/A     Years of education: N/A     Occupational History     Not on file.     Social History Main Topics     Smoking status: Current Every Day Smoker     Packs/day: 1.00     Types: Cigarettes     Smokeless tobacco: Never Used     Alcohol use 0.0 oz/week     0 Standard drinks or equivalent per week      Comment: occasional     Drug use: No     Sexual activity: Not on file     Other Topics Concern     Parent/Sibling W/ Cabg, Mi Or Angioplasty Before 65f 55m? No      Service No     Blood Transfusions Yes     Permits if needed     Seat Belt Yes     Social History Narrative       5 point ROS negative except as noted above in HPI, including Gen., Resp., CV, GI &  system review.     OBJECTIVE:  B/P: 136/76, Temperature: 98.2, Pulse: 91, Respirations: 16    GENERAL APPEARANCE: Alert, no acute distress  CV: regular rate and rhythm, no murmur, rub or gallop  RESP: lungs clear to auscultation bilaterally  ABDOMEN: normal bowel sounds, soft, nontender, no hepatosplenomegaly or other masses  SKIN: no suspicious lesions or rashes to visualized skin  NEURO: Alert, oriented x 3, speech and mentation normal    ASSESSMENT and PLAN:  ((F41.9) Anxiety  (primary encounter diagnosis)  Comment: stable.   Plan: clonazePAM (KLONOPIN) 0.5 MG tablet        Refill klonopin.  Appropriate UDS last month.  F/u q 6 months.      (Z72.0) Tobacco abuse  Comment: ongoing  Plan: Tobacco Cessation - Order to Satisfy Health         Maintenance        Advise cessation     (Z71.6) Tobacco abuse counseling  Comment: as above.   Plan: as above.     (Z79.899) Controlled substance agreement signed  Comment: as above.   Plan: as above.

## 2018-01-10 NOTE — MR AVS SNAPSHOT
After Visit Summary   1/10/2018    Raquel Sanchez    MRN: [id number]           Patient Information     Date Of Birth          1950        Visit Information        Provider Department      1/10/2018 8:30 AM Pelon David MD [facility]        Today's Diagnoses     Anxiety    -  1    Tobacco abuse        Tobacco abuse counseling        Controlled substance agreement signed          Care Instructions      HOW TO QUIT SMOKING  Smoking is one of the hardest habits to break. About half of all those who have ever smoked have been able to quit, and most of those (about 70%) who still smoke want to quit. Here are some of the best ways to stop smoking.     KEEP TRYING:  It takes most smokers about 8 tries before they are finally able to fully quit. So, the more often you try and fail, the better your chance of quitting the next time! So, don't give up!    GO COLD TURKEY:  Most ex-smokers quit cold turkey. Trying to cut back gradually doesn't seem to work as well, perhaps because it continues the smoking habit. Also, it is possible to fool yourself by inhaling more while smoking fewer cigarettes. This results in the same amount of nicotine in your body!    GET SUPPORT:  Support programs can make an important difference, especially for the heavy smoker. These groups offer lectures, methods to change your behavior and peer support. Call the free national Quitline for more information. 800-QUIT-NOW (576-393-6474). Low-cost or free programs are offered by many hospitals, local chapters of the American Lung Association (222-221-4168) and the American Cancer Society (905-940-9966). Support at home is important too. Non-smokers can help by offering praise and encouragement. If the smoker fails to quit, encourage them to try again!    OVER-THE-COUNTER MEDICINES:  For those who can't quit on their own, Nicotine Replacement Therapy (NRT) may make quitting much easier. Certain aids such as the  nicotine patch, gum and lozenge are available without a prescription. However, it is best to use these under the guidance of your doctor. The skin patch provides a steady supply of nicotine to the body. Nicotine gum and lozenge gives temporary bursts of low levels of nicotine. Both methods take the edge off the craving for cigarettes. WARNING: If you feel symptoms of nicotine overdose, such as nausea, vomiting, dizziness, weakness, or fast heartbeat, stop using these and see your doctor.    PRESCRIPTION MEDICINES:  After evaluating your smoking patterns and prior attempts at quitting, your doctor may offer a prescription medicine such as bupropion (Zyban, Wellbutrin), varenicline (Chantix, Champix), a niocotine inhaler or nasal spray. Each has its unique advantage and side effects which your doctor can review with you.    HEALTH BENEFITS OF QUITTING:  The benefits of quitting start right away and keep improving the longer you go without smokin minutes: blood pressure and pulse return to normal  8 hours: oxygen levels return to normal  2 days: ability to smell and taste begins to improve as damaged nerves start to regrow  2-3 weeks: circulation and lung function improves  1-9 months: decreased cough, congestion and shortness of breath; less tired  1 year: risk of heart attack decreases by half  5 years: risk of lung cancer decreases by half; risk of stroke becomes the same as a non-smoker  For information about how to quit smoking, visit the following links:  National Cancer [place] ,   Clearing the Air, Quit Smoking Today   - an online booklet. http://www.smokefree.gov/pubs/clearing_the_air.pdf  Smokefree.gov http://smokefree.gov/  QuitNet http://www.quitnet.com/    2758-0200 Salvador Alarcon, [address] [place] [address], [place] [address]. All rights reserved. This information is not intended as a substitute for professional medical care. Always follow your healthcare professional's instructions.    The Benefits  of Living Smoke Free  What do you want to gain from quitting? Check off some reasons to quit.  Health Benefits  ___ Reduce my risk of lung cancer, heart disease, chronic lung disease  ___ Have fewer wrinkles and softer skin  ___ Improve my sense of taste and smell  ___ For pregnant women--reduce the risk of having a miscarriage, stillbirth, premature birth, or low-birth-weight baby  Personal Benefits  ___ Feel more in control of my life  ___ Have better-smelling hair, breath, clothes, home, and car  ___ Save time by not having to take smoke breaks, buy cigarettes, or hunt for a light  ___ Have whiter teeth  Family Benefits  ___ Reduce my children s respiratory tract infections  ___ Set a good example for my children  ___ Reduce my family s cancer risk  Financial Benefits  ___ Save hundreds of dollars each year that would be spent on cigarettes  ___ Save money on medical bills  ___ Save on life, health, and car insurance premiums    Those Dollars Add Up!  Cigarettes are expensive, and getting more expensive all the time. Do you realize how much money you are spending on cigarettes per year? What is the average amount you spend on a pack of cigarettes? What is the average number of packs that you smoke per day? Using your answers to these questions, fill in this formula to help you find out:  ($ _____ per pack) ×  ( _____ number of packs per day) × (365 days) =  $ _____ yearly cost of smoking  Besides tobacco, there are other costs, including extra cleaning bills and replacement costs for clothing and furniture; medical expenses for smoking-related illnesses; and higher health, life, and car insurance premiums.    Cigars and Pipes Count Too!  Cigars and pipes are also dangerous. So are smokeless (chewing) tobacco and snuff. All of these products contain nicotine, a highly addictive substance that has harmful effects on your body. Quitting smoking means giving up all tobacco products.      7975-4678 Salvador Alarcon,  "[address] [place] [address]. All rights reserved. This information is not intended as a substitute for professional medical care. Always follow your healthcare professional's instructions.          Follow-ups after your visit        Who to contact     If you have questions or need follow up information about today's clinic visit or your schedule please contact [facility] directly at [phone].  Normal or non-critical lab and imaging results will be communicated to you by MyChart, letter or phone within 4 business days after the clinic has received the results. If you do not hear from us within 7 days, please contact the clinic through MyChart or phone. If you have a critical or abnormal lab result, we will notify you by phone as soon as possible.  Submit refill requests through [employer] or call your pharmacy and they will forward the refill request to us. Please allow 3 business days for your refill to be completed.          Additional Information About Your Visit        Care EveryWhere ID     This is your Care EveryWhere ID. This could be used by other organizations to access your [place] medical records  [id number]        Your Vitals Were     Pulse Temperature Respirations Height Pulse Oximetry BMI (Body Mass Index)    91 98.2  F (36.8  C) (Tympanic) 16 5' 2.5\" (1.588 m) 94% 22.71 kg/m2       Blood Pressure from Last 3 Encounters:   01/10/18 136/76   11/29/17 120/82   10/09/17 126/80    Weight from Last 3 Encounters:   01/10/18 126 lb 3.2 oz (57.2 kg)   11/29/17 127 lb (57.6 kg)   10/09/17 130 lb (59 kg)              We Performed the Following     Tobacco Cessation - Order to Satisfy Health Maintenance          Where to get your medicines      Some of these will need a paper prescription and others can be bought over the counter.  Ask your nurse if you have questions.     Bring a paper prescription for each of these medications     clonazePAM 0.5 MG tablet          Primary Care " Provider Office Phone # Fax #    Pelon David [phone]       [facility] [address]  [facility] [place] [address]        Equal Access to Services     LAKSHMIBRODY HOME : Hadii aad ku haddaytono Soomaali, waaxda luqadaha, qaybta kaalmada adeegyada, aly cervantesn fifi cook lagonzaleseddie naty. So [facility] [phone].    ATENCIÓN: Si habla español, tiene a pathak disposición servicios gratuitos de asistencia lingüística. Llame al [phone].    We comply with applicable federal civil rights laws and [place] laws. We do not discriminate on the basis of race, color, national origin, age, disability, sex, sexual orientation, or gender identity.            Thank you!     Thank you for choosing [facility]  for your care. Our goal is always to provide you with excellent care. Hearing back from our patients is one way we can continue to improve our services. Please take a few minutes to complete the written survey that you may receive in the mail after your visit with us. Thank you!             Your Updated Medication List - Protect others around you: Learn how to safely use, store and throw away your medicines at www.disposemymeds.org.          This list is accurate as of: 1/10/18  9:02 AM.  Always use your most recent med list.                   Brand Name Dispense Instructions for use Diagnosis    citalopram 40 MG tablet    celeXA    30 tablet    TAKE 1 TABLET DAILY BY MOUTH    Dysthymia       clonazePAM 0.5 MG tablet    klonoPIN    60 tablet    1 tab bid prn    Anxiety       HYDROcodone-acetaminophen 5-325 MG per tablet    NORCO    60 tablet    Take 1 tablet by mouth every 6 hours as needed for moderate to severe pain    Rash and nonspecific skin eruption       hydrOXYzine 25 MG capsule    VISTARIL    120 capsule    Take 1 capsule (25 mg) by mouth 3 times daily as needed for itching    Rash       MULTIVITAMIN ADULTS 50+ Tabs      Take 1 tablet by mouth daily        triamcinolone 0.5 %  cream    KENALOG    30 g    APPLY SPARINGLY TO AFFECTED AREA THREE TIMES DAILY.    Dermatitis

## 2018-01-11 ASSESSMENT — ANXIETY QUESTIONNAIRES: GAD7 TOTAL SCORE: 16

## 2018-01-17 DIAGNOSIS — R21 RASH AND NONSPECIFIC SKIN ERUPTION: ICD-10-CM

## 2018-01-17 RX ORDER — HYDROCODONE BITARTRATE AND ACETAMINOPHEN 5; 325 MG/1; MG/1
1 TABLET ORAL EVERY 6 HOURS PRN
Qty: 60 TABLET | Refills: 0 | Status: SHIPPED | OUTPATIENT
Start: 2018-01-17 | End: 2018-02-16

## 2018-01-17 NOTE — TELEPHONE ENCOUNTER
adamco      Last Written Prescription Date:  12/20/17  Last Fill Quantity: 60,   # refills: 0  Last Office Visit: 1/10/18  Future Office visit:       Routing refill request to provider for review/approval because:  Drug not on the [facility], [facility] or [facility] refill protocol or controlled substance

## 2018-02-16 DIAGNOSIS — R21 RASH AND NONSPECIFIC SKIN ERUPTION: ICD-10-CM

## 2018-02-16 RX ORDER — HYDROCODONE BITARTRATE AND ACETAMINOPHEN 5; 325 MG/1; MG/1
1 TABLET ORAL EVERY 6 HOURS PRN
Qty: 60 TABLET | Refills: 0 | Status: SHIPPED | OUTPATIENT
Start: 2018-02-16 | End: 2018-03-14

## 2018-02-16 NOTE — TELEPHONE ENCOUNTER
Called informed written RX is ready to  at  [facility]   Advised to call for refill early in week Dr David not available Fridays  Liseth Riggs

## 2018-02-16 NOTE — TELEPHONE ENCOUNTER
Controlled Substance Refill Request for Norco  Problem List Complete:  Yes    Last Written Prescription Date:  1.17.18  Last Fill Quantity: 60,   # refills: 0    [facility] Office Visit with [facility]: 1.10.18    Clinic visit frequency required: Q 6  months     Future Office visit:     Controlled substance agreement on file: Yes:  Date 2.2.17.     Processing:  Patient will  in clinic   checked in past 6 months?  Yes 9.12.17

## 2018-02-16 NOTE — TELEPHONE ENCOUNTER
Call. I refilled both Ricco's and Brandie narcotic today. In future need refilled by Dr. David. Also Ricco needs a visit with Dr. David for narcotic visit before next refill.

## 2018-03-14 DIAGNOSIS — R21 RASH AND NONSPECIFIC SKIN ERUPTION: ICD-10-CM

## 2018-03-14 RX ORDER — HYDROCODONE BITARTRATE AND ACETAMINOPHEN 5; 325 MG/1; MG/1
1 TABLET ORAL EVERY 6 HOURS PRN
Qty: 60 TABLET | Refills: 0 | Status: SHIPPED | OUTPATIENT
Start: 2018-03-14 | End: 2018-04-10

## 2018-03-14 NOTE — TELEPHONE ENCOUNTER
adamco      Last Written Prescription Date:  2/16/18  Last Fill Quantity: 60,   # refills: 0  Last Office Visit: 1/10/18  Future Office visit:       Routing refill request to provider for review/approval because:  Drug not on the [facility], [facility] or [facility] refill protocol or controlled substance

## 2018-04-10 DIAGNOSIS — R21 RASH AND NONSPECIFIC SKIN ERUPTION: ICD-10-CM

## 2018-04-10 NOTE — TELEPHONE ENCOUNTER
adamco      Last Written Prescription Date:  3/14/18  Last Fill Quantity: 60,   # refills: 0  Last Office Visit: 1/10/18  Future Office visit:       Routing refill request to provider for review/approval because:  Drug not on the [facility], [facility] or [facility] refill protocol or controlled substance

## 2018-04-11 RX ORDER — HYDROCODONE BITARTRATE AND ACETAMINOPHEN 5; 325 MG/1; MG/1
1 TABLET ORAL EVERY 6 HOURS PRN
Qty: 60 TABLET | Refills: 0 | Status: SHIPPED | OUTPATIENT
Start: 2018-04-11 | End: 2018-05-14

## 2018-05-14 DIAGNOSIS — R21 RASH AND NONSPECIFIC SKIN ERUPTION: ICD-10-CM

## 2018-05-14 NOTE — TELEPHONE ENCOUNTER
adamco      Last Written Prescription Date:  4/11/18  Last Fill Quantity: 60,   # refills: 0  Last Office Visit: 1/10/18  Future Office visit:       Routing refill request to provider for review/approval because:  Drug not on the [facility], [facility] or [facility] refill protocol or controlled substance

## 2018-05-15 RX ORDER — HYDROCODONE BITARTRATE AND ACETAMINOPHEN 5; 325 MG/1; MG/1
1 TABLET ORAL EVERY 6 HOURS PRN
Qty: 60 TABLET | Refills: 0 | Status: SHIPPED | OUTPATIENT
Start: 2018-05-15 | End: 2018-06-12

## 2018-06-12 DIAGNOSIS — R21 RASH AND NONSPECIFIC SKIN ERUPTION: ICD-10-CM

## 2018-06-12 NOTE — TELEPHONE ENCOUNTER
adamco      Last Written Prescription Date:  5/15/18  Last Fill Quantity: 60,   # refills: 0  Last Office Visit: 1/10/18  Future Office visit:       Routing refill request to provider for review/approval because:  Drug not on the [facility], [facility] or [facility] refill protocol or controlled substance

## 2018-06-13 RX ORDER — HYDROCODONE BITARTRATE AND ACETAMINOPHEN 5; 325 MG/1; MG/1
1 TABLET ORAL EVERY 6 HOURS PRN
Qty: 60 TABLET | Refills: 0 | Status: SHIPPED | OUTPATIENT
Start: 2018-06-13 | End: 2018-07-31

## 2018-06-28 DIAGNOSIS — R21 RASH: ICD-10-CM

## 2018-06-28 NOTE — TELEPHONE ENCOUNTER
Refill request for:  Hydroxyzine Pamoate (Vistaril) 25 mg capsule  Last refill 11-30-15  , qty #120 with 1 refill  Last office visit 1-10-18

## 2018-06-29 DIAGNOSIS — F41.9 ANXIETY: ICD-10-CM

## 2018-06-29 RX ORDER — HYDROXYZINE PAMOATE 25 MG/1
CAPSULE ORAL
Qty: 120 CAPSULE | OUTPATIENT
Start: 2018-06-29

## 2018-06-29 RX ORDER — CLONAZEPAM 0.5 MG/1
TABLET ORAL
Qty: 60 TABLET | Refills: 0 | Status: SHIPPED | OUTPATIENT
Start: 2018-06-29 | End: 2018-07-31

## 2018-07-02 ENCOUNTER — TELEPHONE (OUTPATIENT)
Dept: FAMILY MEDICINE | Facility: OTHER | Age: 68
End: 2018-07-02

## 2018-07-02 NOTE — TELEPHONE ENCOUNTER
Patient returns call, she is advised that she needs a fu appt, transferred to scheduling for this.  Ania Chávez

## 2018-07-02 NOTE — TELEPHONE ENCOUNTER
I spoke with pt she needs a f/u anxiety before next refill.  Please put pt in on July 31 at 1015 spot and put on the wait list.

## 2018-07-02 NOTE — TELEPHONE ENCOUNTER
Scheduled appt with Dr Frankie Quiroz 7/31/18 10:15AM  On wait list for sooner appt  Liseth Riggs

## 2018-07-02 NOTE — TELEPHONE ENCOUNTER
10:19 AM    Reason for Call: OVERBOOK    Patient is having the following symptoms: n/a for n/a minutes.    The patient is requesting an appointment for asap with Dr. David.    Was an appointment offered for this call? Yes  If yes : Appointment type              Date 8/24/18    Preferred method for responding to this message: Telephone Call  What is your phone number ?[phone]    If we cannot reach you directly, may we leave a detailed response at the number you provided? Yes    Can this message wait until your PCP/provider returns, if unavailable today? YES,     Jovanna French
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
## 2018-07-31 ENCOUNTER — OFFICE VISIT (OUTPATIENT)
Dept: FAMILY MEDICINE | Facility: OTHER | Age: 68
End: 2018-07-31
Attending: FAMILY MEDICINE
Payer: MEDICARE

## 2018-07-31 VITALS
TEMPERATURE: 97.4 F | OXYGEN SATURATION: 93 % | SYSTOLIC BLOOD PRESSURE: 126 MMHG | DIASTOLIC BLOOD PRESSURE: 78 MMHG | HEIGHT: 63 IN | HEART RATE: 81 BPM | WEIGHT: 131 LBS | BODY MASS INDEX: 23.21 KG/M2

## 2018-07-31 DIAGNOSIS — Z72.0 TOBACCO ABUSE: ICD-10-CM

## 2018-07-31 DIAGNOSIS — F34.1 DYSTHYMIA: ICD-10-CM

## 2018-07-31 DIAGNOSIS — Z71.6 TOBACCO ABUSE COUNSELING: ICD-10-CM

## 2018-07-31 DIAGNOSIS — R21 RASH AND NONSPECIFIC SKIN ERUPTION: ICD-10-CM

## 2018-07-31 DIAGNOSIS — F41.9 ANXIETY: ICD-10-CM

## 2018-07-31 PROCEDURE — G0463 HOSPITAL OUTPT CLINIC VISIT: HCPCS

## 2018-07-31 PROCEDURE — 99214 OFFICE O/P EST MOD 30 MIN: CPT | Performed by: FAMILY MEDICINE

## 2018-07-31 RX ORDER — CITALOPRAM HYDROBROMIDE 40 MG/1
TABLET ORAL
Qty: 90 TABLET | Refills: 3 | Status: SHIPPED | OUTPATIENT
Start: 2018-07-31 | End: 2019-08-19

## 2018-07-31 RX ORDER — HYDROCODONE BITARTRATE AND ACETAMINOPHEN 5; 325 MG/1; MG/1
TABLET ORAL
Qty: 20 TABLET | Refills: 0 | Status: SHIPPED | OUTPATIENT
Start: 2018-07-31 | End: 2019-10-14

## 2018-07-31 RX ORDER — CLONAZEPAM 0.5 MG/1
TABLET ORAL
Qty: 60 TABLET | Refills: 5 | Status: SHIPPED | OUTPATIENT
Start: 2018-07-31 | End: 2019-01-29

## 2018-07-31 ASSESSMENT — PAIN SCALES - GENERAL: PAINLEVEL: NO PAIN (1)

## 2018-07-31 ASSESSMENT — ANXIETY QUESTIONNAIRES
3. WORRYING TOO MUCH ABOUT DIFFERENT THINGS: MORE THAN HALF THE DAYS
6. BECOMING EASILY ANNOYED OR IRRITABLE: MORE THAN HALF THE DAYS
1. FEELING NERVOUS, ANXIOUS, OR ON EDGE: MORE THAN HALF THE DAYS
IF YOU CHECKED OFF ANY PROBLEMS ON THIS QUESTIONNAIRE, HOW DIFFICULT HAVE THESE PROBLEMS MADE IT FOR YOU TO DO YOUR WORK, TAKE CARE OF THINGS AT HOME, OR GET ALONG WITH OTHER PEOPLE: SOMEWHAT DIFFICULT
2. NOT BEING ABLE TO STOP OR CONTROL WORRYING: MORE THAN HALF THE DAYS
5. BEING SO RESTLESS THAT IT IS HARD TO SIT STILL: MORE THAN HALF THE DAYS
GAD7 TOTAL SCORE: 12
7. FEELING AFRAID AS IF SOMETHING AWFUL MIGHT HAPPEN: NOT AT ALL

## 2018-07-31 ASSESSMENT — PATIENT HEALTH QUESTIONNAIRE - PHQ9: 5. POOR APPETITE OR OVEREATING: MORE THAN HALF THE DAYS

## 2018-07-31 NOTE — NURSING NOTE
"Chief Complaint   Patient presents with     Anxiety       Initial /78  Pulse 81  Temp 97.4  F (36.3  C)  Ht 5' 2.5\" (1.588 m)  Wt 131 lb (59.4 kg)  SpO2 93%  BMI 23.58 kg/m2 Estimated body mass index is 23.58 kg/(m^2) as calculated from the following:    Height as of this encounter: 5' 2.5\" (1.588 m).    Weight as of this encounter: 131 lb (59.4 kg).  Medication Reconciliation: complete    Nga Martinez LPN  "

## 2018-07-31 NOTE — PATIENT INSTRUCTIONS

## 2018-08-01 ASSESSMENT — PATIENT HEALTH QUESTIONNAIRE - PHQ9: SUM OF ALL RESPONSES TO PHQ QUESTIONS 1-9: 4

## 2018-08-01 ASSESSMENT — ANXIETY QUESTIONNAIRES: GAD7 TOTAL SCORE: 12

## 2019-01-29 DIAGNOSIS — F41.9 ANXIETY: ICD-10-CM

## 2019-01-29 RX ORDER — CLONAZEPAM 0.5 MG/1
TABLET ORAL
Qty: 60 TABLET | Refills: 0 | Status: SHIPPED | OUTPATIENT
Start: 2019-01-29 | End: 2019-02-27

## 2019-01-29 NOTE — TELEPHONE ENCOUNTER
clonazePAM (KLONOPIN) 0.5 MG tablet  Last Written Prescription Date:  7/31/18  Last Fill Quantity: 60,   # refills: 5  Last Office Visit: 7/31/18  Future Office visit:       Routing refill request to provider for review/approval because:  Drug not on the [facility], [facility] or [facility] refill protocol or controlled substance

## 2019-02-25 DIAGNOSIS — F41.9 ANXIETY: ICD-10-CM

## 2019-02-25 NOTE — TELEPHONE ENCOUNTER
clonazePAM (KLONOPIN) 0.5 MG tablet  Last Written Prescription Date:  1/29/19  Last Fill Quantity: 60,   # refills: 0  Last Office Visit: 7/31/18  Future Office visit:       Routing refill request to provider for review/approval because:  Drug not on the [facility], [facility] or [facility] refill protocol or controlled substance

## 2019-02-27 RX ORDER — CLONAZEPAM 0.5 MG/1
TABLET ORAL
Qty: 60 TABLET | Refills: 0 | Status: SHIPPED | OUTPATIENT
Start: 2019-02-27 | End: 2019-03-28

## 2019-03-27 DIAGNOSIS — F41.9 ANXIETY: ICD-10-CM

## 2019-03-27 NOTE — TELEPHONE ENCOUNTER
clonazePAM (KLONOPIN) 0.5 MG tablet  Last Written Prescription Date:  2/27/19  Last Fill Quantity: 60,   # refills: 0  Last Office Visit: 7/31/18  Future Office visit:       Routing refill request to provider for review/approval because:  Drug not on the [facility], P or [facility] refill protocol or controlled substance

## 2019-03-28 RX ORDER — CLONAZEPAM 0.5 MG/1
TABLET ORAL
Qty: 60 TABLET | Refills: 0 | Status: SHIPPED | OUTPATIENT
Start: 2019-03-28 | End: 2019-05-01

## 2019-05-01 DIAGNOSIS — F41.9 ANXIETY: ICD-10-CM

## 2019-05-01 RX ORDER — CLONAZEPAM 0.5 MG/1
TABLET ORAL
Qty: 60 TABLET | Refills: 0 | Status: SHIPPED | OUTPATIENT
Start: 2019-05-01 | End: 2019-05-31

## 2019-05-01 NOTE — TELEPHONE ENCOUNTER
Klonopin      Last Written Prescription Date:  3/28/19  Last Fill Quantity: 60,   # refills: 0  Last Office Visit: 7/31/18  Future Office visit:       Routing refill request to provider for review/approval because:

## 2019-05-29 DIAGNOSIS — F41.9 ANXIETY: ICD-10-CM

## 2019-05-29 NOTE — TELEPHONE ENCOUNTER
clonazePAM (KLONOPIN) 0.5 MG tablet  Last Written Prescription Date:  5/1/19  Last Fill Quantity: 60,   # refills: 0  Last Office Visit: 7/31/18  Future Office visit:

## 2019-05-31 RX ORDER — CLONAZEPAM 0.5 MG/1
TABLET ORAL
Qty: 60 TABLET | Refills: 0 | Status: SHIPPED | OUTPATIENT
Start: 2019-05-31 | End: 2019-07-15

## 2019-07-15 DIAGNOSIS — F41.9 ANXIETY: ICD-10-CM

## 2019-07-15 RX ORDER — CLONAZEPAM 0.5 MG/1
TABLET ORAL
Qty: 60 TABLET | Refills: 0 | Status: SHIPPED | OUTPATIENT
Start: 2019-07-15 | End: 2019-08-21

## 2019-07-15 NOTE — TELEPHONE ENCOUNTER
Klonopin       Last Written Prescription Date:  5/31/2019  Last Fill Quantity: 60,   # refills: 0  Last Office Visit: 7/31/2018  Future Office visit:       }

## 2019-08-19 DIAGNOSIS — F41.9 ANXIETY: ICD-10-CM

## 2019-08-19 DIAGNOSIS — F34.1 DYSTHYMIA: ICD-10-CM

## 2019-08-19 DIAGNOSIS — L30.9 DERMATITIS: ICD-10-CM

## 2019-08-19 NOTE — TELEPHONE ENCOUNTER
Clonazepam   Last Written Prescription Date: 7/15/19  Last Fill Quantity: 60 # of Refills: 0  Last Office Visit: 7/31/18

## 2019-08-19 NOTE — LETTER
August 21, 2019      Raquel Sanchez  [address] [facility] [address]        Dear Raquel,     APPOINTMENT REMINDER:   Our records indicates that it is time for you to be seen for yearly exam, labs and medication review.      Taking care of your health is important to us, and ongoing visits with your provider are vital to your care.    We look forward to seeing you in the near future.  You may call our office at [phone] to schedule a visit.     Please disregard this notice if you have already made an appointment.        Sincerely,        Pelon David MD

## 2019-08-21 RX ORDER — CITALOPRAM HYDROBROMIDE 40 MG/1
TABLET ORAL
Qty: 90 TABLET | Refills: 3 | Status: SHIPPED | OUTPATIENT
Start: 2019-08-21 | End: 2019-12-09

## 2019-08-21 RX ORDER — CLONAZEPAM 0.5 MG/1
TABLET ORAL
Qty: 60 TABLET | Refills: 0 | Status: SHIPPED | OUTPATIENT
Start: 2019-08-21 | End: 2019-09-25

## 2019-08-21 RX ORDER — TRIAMCINOLONE ACETONIDE 5 MG/G
CREAM TOPICAL
Qty: 30 G | Refills: 1 | Status: SHIPPED | OUTPATIENT
Start: 2019-08-21 | End: 2020-12-02

## 2019-09-24 DIAGNOSIS — F41.9 ANXIETY: ICD-10-CM

## 2019-09-24 NOTE — TELEPHONE ENCOUNTER
clonazePAM (KLONOPIN) 0.5 MG tablet  Last Written Prescription Date:  8/21/19  Last Fill Quantity: 60,   # refills: 0  Last Office Visit: 7/31/18  Future Office visit:    Next 5 appointments (look out 90 days)    Oct 29, 2019  2:45 PM CDT  (Arrive by 2:30 PM)  SHORT with Pelon David MD  [facility] ([facility] ) [address] [facility] [place] [facility] [place] [address]  [phone]

## 2019-09-25 RX ORDER — CLONAZEPAM 0.5 MG/1
TABLET ORAL
Qty: 60 TABLET | Refills: 5 | Status: SHIPPED | OUTPATIENT
Start: 2019-09-25 | End: 2019-12-09

## 2019-10-14 ENCOUNTER — APPOINTMENT (OUTPATIENT)
Dept: GENERAL RADIOLOGY | Facility: HOSPITAL | Age: 69
End: 2019-10-14
Attending: FAMILY MEDICINE
Payer: MEDICARE

## 2019-10-14 ENCOUNTER — HOSPITAL ENCOUNTER (EMERGENCY)
Facility: HOSPITAL | Age: 69
Discharge: ANOTHER HOSPITAL | End: 2019-10-15
Attending: FAMILY MEDICINE | Admitting: FAMILY MEDICINE
Payer: MEDICARE

## 2019-10-14 DIAGNOSIS — S72.001A CLOSED DISPLACED FRACTURE OF RIGHT FEMORAL NECK (H): ICD-10-CM
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
## 2019-10-14 LAB
ALBUMIN SERPL-MCNC: 4.1 G/DL (ref 3.4–5)
ALBUMIN UR-MCNC: NEGATIVE MG/DL
ALP SERPL-CCNC: 51 U/L (ref 40–150)
ALT SERPL W P-5'-P-CCNC: 15 U/L (ref 0–50)
ANION GAP SERPL CALCULATED.3IONS-SCNC: 10 MMOL/L (ref 3–14)
APPEARANCE UR: CLEAR
AST SERPL W P-5'-P-CCNC: 19 U/L (ref 0–45)
BASOPHILS # BLD AUTO: 0.1 10E9/L (ref 0–0.2)
BASOPHILS NFR BLD AUTO: 0.3 %
BILIRUB SERPL-MCNC: 0.4 MG/DL (ref 0.2–1.3)
BILIRUB UR QL STRIP: NEGATIVE
BUN SERPL-MCNC: 5 MG/DL (ref 7–30)
CALCIUM SERPL-MCNC: 8.2 MG/DL (ref 8.5–10.1)
CHLORIDE SERPL-SCNC: 97 MMOL/L (ref 94–109)
CK SERPL-CCNC: 87 U/L (ref 30–225)
CO2 SERPL-SCNC: 22 MMOL/L (ref 20–32)
COLOR UR AUTO: ABNORMAL
CREAT SERPL-MCNC: 0.37 MG/DL (ref 0.52–1.04)
DIFFERENTIAL METHOD BLD: ABNORMAL
EOSINOPHIL # BLD AUTO: 0 10E9/L (ref 0–0.7)
EOSINOPHIL NFR BLD AUTO: 0.1 %
ERYTHROCYTE [DISTWIDTH] IN BLOOD BY AUTOMATED COUNT: 12.7 % (ref 10–15)
ETHANOL SERPL-MCNC: 0.07 G/DL
GFR SERPL CREATININE-BSD FRML MDRD: >90 ML/MIN/{1.73_M2}
GLUCOSE SERPL-MCNC: 110 MG/DL (ref 70–99)
GLUCOSE UR STRIP-MCNC: NEGATIVE MG/DL
HCT VFR BLD AUTO: 40.5 % (ref 35–47)
HGB BLD-MCNC: 14.6 G/DL (ref 11.7–15.7)
HGB UR QL STRIP: NEGATIVE
IMM GRANULOCYTES # BLD: 0.1 10E9/L (ref 0–0.4)
IMM GRANULOCYTES NFR BLD: 0.8 %
KETONES UR STRIP-MCNC: 10 MG/DL
LACTATE BLD-SCNC: 3 MMOL/L (ref 0.7–2)
LEUKOCYTE ESTERASE UR QL STRIP: NEGATIVE
LYMPHOCYTES # BLD AUTO: 1.3 10E9/L (ref 0.8–5.3)
LYMPHOCYTES NFR BLD AUTO: 8.2 %
MCH RBC QN AUTO: 34 PG (ref 26.5–33)
MCHC RBC AUTO-ENTMCNC: 36 G/DL (ref 31.5–36.5)
MCV RBC AUTO: 94 FL (ref 78–100)
MONOCYTES # BLD AUTO: 0.9 10E9/L (ref 0–1.3)
MONOCYTES NFR BLD AUTO: 5.6 %
NEUTROPHILS # BLD AUTO: 13.6 10E9/L (ref 1.6–8.3)
NEUTROPHILS NFR BLD AUTO: 85 %
NITRATE UR QL: NEGATIVE
NRBC # BLD AUTO: 0 10*3/UL
NRBC BLD AUTO-RTO: 0 /100
PH UR STRIP: 6.5 PH (ref 4.7–8)
PLATELET # BLD AUTO: 296 10E9/L (ref 150–450)
POTASSIUM SERPL-SCNC: 3.4 MMOL/L (ref 3.4–5.3)
PROT SERPL-MCNC: 8.1 G/DL (ref 6.8–8.8)
RBC # BLD AUTO: 4.3 10E12/L (ref 3.8–5.2)
SODIUM SERPL-SCNC: 129 MMOL/L (ref 133–144)
SOURCE: ABNORMAL
SP GR UR STRIP: 1.01 (ref 1–1.03)
UROBILINOGEN UR STRIP-MCNC: NORMAL MG/DL (ref 0–2)
WBC # BLD AUTO: 16 10E9/L (ref 4–11)

## 2019-10-14 PROCEDURE — 71045 X-RAY EXAM CHEST 1 VIEW: CPT | Mod: TC

## 2019-10-14 PROCEDURE — 81003 URINALYSIS AUTO W/O SCOPE: CPT | Performed by: FAMILY MEDICINE

## 2019-10-14 PROCEDURE — 25000128 H RX IP 250 OP 636: Performed by: FAMILY MEDICINE

## 2019-10-14 PROCEDURE — 73502 X-RAY EXAM HIP UNI 2-3 VIEWS: CPT | Mod: TC

## 2019-10-14 PROCEDURE — 93005 ELECTROCARDIOGRAM TRACING: CPT | Mod: XU

## 2019-10-14 PROCEDURE — 51702 INSERT TEMP BLADDER CATH: CPT

## 2019-10-14 PROCEDURE — 96374 THER/PROPH/DIAG INJ IV PUSH: CPT | Mod: XU

## 2019-10-14 PROCEDURE — 36415 COLL VENOUS BLD VENIPUNCTURE: CPT | Performed by: FAMILY MEDICINE

## 2019-10-14 PROCEDURE — 85025 COMPLETE CBC W/AUTO DIFF WBC: CPT | Performed by: FAMILY MEDICINE

## 2019-10-14 PROCEDURE — 80320 DRUG SCREEN QUANTALCOHOLS: CPT | Performed by: FAMILY MEDICINE

## 2019-10-14 PROCEDURE — 96375 TX/PRO/DX INJ NEW DRUG ADDON: CPT | Mod: XU

## 2019-10-14 PROCEDURE — 82550 ASSAY OF CK (CPK): CPT | Performed by: FAMILY MEDICINE

## 2019-10-14 PROCEDURE — 83605 ASSAY OF LACTIC ACID: CPT | Performed by: FAMILY MEDICINE

## 2019-10-14 PROCEDURE — 80053 COMPREHEN METABOLIC PANEL: CPT | Performed by: FAMILY MEDICINE

## 2019-10-14 PROCEDURE — 99285 EMERGENCY DEPT VISIT HI MDM: CPT | Mod: Z6 | Performed by: FAMILY MEDICINE

## 2019-10-14 PROCEDURE — 99285 EMERGENCY DEPT VISIT HI MDM: CPT | Mod: 25

## 2019-10-14 PROCEDURE — 93010 ELECTROCARDIOGRAM REPORT: CPT | Performed by: INTERNAL MEDICINE

## 2019-10-14 RX ORDER — ACETAMINOPHEN 325 MG/1
325 TABLET ORAL EVERY 6 HOURS PRN
COMMUNITY

## 2019-10-14 RX ORDER — ONDANSETRON 2 MG/ML
4 INJECTION INTRAMUSCULAR; INTRAVENOUS ONCE
Status: COMPLETED | OUTPATIENT
Start: 2019-10-14 | End: 2019-10-14

## 2019-10-14 RX ORDER — HYDROMORPHONE HYDROCHLORIDE 1 MG/ML
0.5 INJECTION, SOLUTION INTRAMUSCULAR; INTRAVENOUS; SUBCUTANEOUS ONCE
Status: COMPLETED | OUTPATIENT
Start: 2019-10-14 | End: 2019-10-14

## 2019-10-14 RX ORDER — KETOROLAC TROMETHAMINE 15 MG/ML
15 INJECTION, SOLUTION INTRAMUSCULAR; INTRAVENOUS ONCE
Status: COMPLETED | OUTPATIENT
Start: 2019-10-14 | End: 2019-10-14

## 2019-10-14 RX ADMIN — KETOROLAC TROMETHAMINE 15 MG: 15 INJECTION, SOLUTION INTRAMUSCULAR; INTRAVENOUS at 22:46

## 2019-10-14 RX ADMIN — ONDANSETRON 4 MG: 2 INJECTION INTRAMUSCULAR; INTRAVENOUS at 22:46

## 2019-10-14 RX ADMIN — HYDROMORPHONE HYDROCHLORIDE 0.5 MG: 1 INJECTION, SOLUTION INTRAMUSCULAR; INTRAVENOUS; SUBCUTANEOUS at 22:45

## 2019-10-14 ASSESSMENT — ENCOUNTER SYMPTOMS
ARTHRALGIAS: 0
NECK STIFFNESS: 0
EYE REDNESS: 0
COLOR CHANGE: 0
ABDOMINAL PAIN: 0
DIFFICULTY URINATING: 0
FATIGUE: 0
SHORTNESS OF BREATH: 0
HEADACHES: 0
CONFUSION: 0

## 2019-10-15 ENCOUNTER — TRANSFERRED RECORDS (OUTPATIENT)
Dept: HEALTH INFORMATION MANAGEMENT | Facility: CLINIC | Age: 69
End: 2019-10-15

## 2019-10-15 VITALS
TEMPERATURE: 98.8 F | OXYGEN SATURATION: 95 % | DIASTOLIC BLOOD PRESSURE: 100 MMHG | RESPIRATION RATE: 16 BRPM | SYSTOLIC BLOOD PRESSURE: 149 MMHG

## 2019-10-15 PROCEDURE — 96376 TX/PRO/DX INJ SAME DRUG ADON: CPT

## 2019-10-15 PROCEDURE — 25000128 H RX IP 250 OP 636: Performed by: FAMILY MEDICINE

## 2019-10-15 RX ORDER — HYDROMORPHONE HYDROCHLORIDE 1 MG/ML
0.5 INJECTION, SOLUTION INTRAMUSCULAR; INTRAVENOUS; SUBCUTANEOUS
Status: COMPLETED | OUTPATIENT
Start: 2019-10-15 | End: 2019-10-15

## 2019-10-15 RX ADMIN — HYDROMORPHONE HYDROCHLORIDE 0.5 MG: 1 INJECTION, SOLUTION INTRAMUSCULAR; INTRAVENOUS; SUBCUTANEOUS at 00:08

## 2019-10-15 NOTE — ED NOTES
PIV started, labs collected and sent.  IV dilaudid, IV Toradol, and IV Zofran given.  Pt down in X-ray.

## 2019-10-15 NOTE — ED NOTES
DATE:  10/14/2019   TIME OF RECEIPT FROM LAB:  7197  LAB TEST:  Lactic  LAB VALUE:  3.0 (Significant)  RESULTS GIVEN WITH READ-BACK TO (PROVIDER): Dr. Lerma  TIME LAB VALUE REPORTED TO PROVIDER:   0613

## 2019-10-15 NOTE — ED PROVIDER NOTES
"  History     Chief Complaint   Patient presents with     Hip Pain     c/o rt hip pain, notes feel outside on her steps at approx 1700. states crawled into the house and unable to stand. denies hitting head or neck.      HPI  Raquel Sanchez is a 69 year old woman who was consuming EtOH tonight and was walking up her outside stairs when she fell and heard a \"pop\" in her right hip. She was unable to stand up and crawled into her house. She fell at 1700 and was found by her daughter at about 2130. Her daughter helped her into the car and brought her to the [facility] ER. She did not hit her head or have an LOC. She reports no neck pain. The patient has otherwise been in her usual state of health without fevers/chills or recent illness.    Allergies:  Allergies   Allergen Reactions     Tape [Adhesive Tape] Blisters     Poison Ivy Extract [Extract Of Poison Ivy] Rash       Problem List:    Patient Active Problem List    Diagnosis Date Noted     Chronic, continuous use of opioids 01/10/2018     Priority: Medium     Patient is followed by Pelon David MD for ongoing prescription of pain medication.  All refills should only be approved by this provider, or covering partner.    Medication(s): Norco 5/325mg.   Maximum quantity per month: #60  Clinic visit frequency required: Q 6  months     Controlled substance agreement:  Encounter-Level CSA - 01/25/2017:          Controlled Substance Agreement - Scan on 2/2/2017 10:36 AM : SCHEDULED MEDICATION USE AGREEMENT (below)              Pain Clinic evaluation in the past: No    DIRE Total Score(s):  No flowsheet data found.    Last [facility] website verification:  done on 9.12.17   [email or web address]       Dysthymia 09/28/2017     Priority: Medium     Controlled substance agreement signed 01/25/2017     Priority: Medium     ACP (advance care planning) 05/16/2016     Priority: Medium     Advance Care Planning 5/16/2016: ACP Review of Chart / Resources Provided:  Reviewed chart " for advance care plan.  Raquel Sanchez has no plan or code status on file. Discussed available resources and provided with information. Confirmed code status reflects current choices pending further ACP discussions.  Confirmed/documented legally designated decision makers.  Added by Kathy Darnell                Past Medical History:    History reviewed. No pertinent past medical history.    Past Surgical History:    Past Surgical History:   Procedure Laterality Date     GYN SURGERY      hysterectomy       Family History:    Family History   Problem Relation Age of Onset     Diabetes No family hx of      Asthma No family hx of      Thyroid Disease No family hx of      Hypertension No family hx of      Hyperlipidemia No family hx of        Social History:  Marital Status:   [2]  Social History     Tobacco Use     Smoking status: Current Every Day Smoker     Packs/day: 1.00     Types: Cigarettes     Smokeless tobacco: Never Used   Substance Use Topics     Alcohol use: Yes     Alcohol/week: 0.0 standard drinks     Comment: occasional     Drug use: No        Medications:    acetaminophen (TYLENOL) 325 MG tablet  citalopram (CELEXA) 40 MG tablet  clonazePAM (KLONOPIN) 0.5 MG tablet  Multiple Vitamins-Minerals (MULTIVITAMIN ADULTS 50+) TABS  hydrOXYzine (VISTARIL) 25 MG capsule  triamcinolone (ARISTOCORT HP) 0.5 % external cream          Review of Systems   Constitutional: Negative for fatigue.   HENT: Negative for congestion.    Eyes: Negative for redness.   Respiratory: Negative for shortness of breath.    Cardiovascular: Negative for chest pain.   Gastrointestinal: Negative for abdominal pain.   Genitourinary: Negative for difficulty urinating.   Musculoskeletal: Negative for arthralgias and neck stiffness.   Skin: Negative for color change.   Neurological: Negative for headaches.   Psychiatric/Behavioral: Negative for confusion.       Physical Exam   BP: 157/94  Heart Rate: 94  Temp: 98.8  F (37.1  C)  Resp:  18  SpO2: 95 %      Physical Exam    General Appearance: well-developed, well-nourished, alert & oriented, no apparent distress.    HEENT: atraumatic. Pupils equal, round & reactive to light, extraocular movements intact. Bilateral ear canals clear. Nose without rhinorrhea or epistaxis. Clear oropharynx. Moist mucous membranes.    Neck: normal inspection, non-tender, full & painless ROM, supple, no lymphadenopathy or nuchal rigidity. No jugular venous distension.    Cardiovascular: regular rate, rhythm, normal S1 & S2, no murmurs, rubs or gallops.    Respiratory: clear lung sounds with good air entry, no wheezes rales or rhonchi, no acute respiratory distress.    Gastrointestinal: normal inspection, normal bowel sounds, non-tender, no masses or organomegaly.    Extremities: normal inspection, 2+/4+ pulses bilaterally, normal & painless ROM, non-tender, joints normal.    Neurologic: CN II - XII intact, no motor/sensory deficit.    Skin: normal color, no skin rash.    ED Course     2315  Patient updated at bedside regarding right femoral neck fracture. Patient would like to be transferred to [facility].    2345  Patient discussed with Dr. Lassiter, [facility], who accepts the patient for transfer.       Procedures        Results for orders placed or performed during the hospital encounter of 10/14/19 (from the past 24 hour(s))   CBC with platelets differential   Result Value Ref Range    WBC 16.0 (H) 4.0 - 11.0 10e9/L    RBC Count 4.30 3.8 - 5.2 10e12/L    Hemoglobin 14.6 11.7 - 15.7 g/dL    Hematocrit 40.5 35.0 - 47.0 %    MCV 94 78 - 100 fl    MCH 34.0 (H) 26.5 - 33.0 pg    MCHC 36.0 31.5 - 36.5 g/dL    RDW 12.7 10.0 - 15.0 %    Platelet Count 296 150 - 450 10e9/L    Diff Method Automated Method     % Neutrophils 85.0 %    % Lymphocytes 8.2 %    % Monocytes 5.6 %    % Eosinophils 0.1 %    % Basophils 0.3 %    % Immature Granulocytes 0.8 %    Nucleated RBCs 0 0 /100    Absolute Neutrophil 13.6  (H) 1.6 - 8.3 10e9/L    Absolute Lymphocytes 1.3 0.8 - 5.3 10e9/L    Absolute Monocytes 0.9 0.0 - 1.3 10e9/L    Absolute Eosinophils 0.0 0.0 - 0.7 10e9/L    Absolute Basophils 0.1 0.0 - 0.2 10e9/L    Abs Immature Granulocytes 0.1 0 - 0.4 10e9/L    Absolute Nucleated RBC 0.0    Comprehensive metabolic panel   Result Value Ref Range    Sodium 129 (L) 133 - 144 mmol/L    Potassium 3.4 3.4 - 5.3 mmol/L    Chloride 97 94 - 109 mmol/L    Carbon Dioxide 22 20 - 32 mmol/L    Anion Gap 10 3 - 14 mmol/L    Glucose 110 (H) 70 - 99 mg/dL    Urea Nitrogen 5 (L) 7 - 30 mg/dL    Creatinine 0.37 (L) 0.52 - 1.04 mg/dL    GFR Estimate >90 >60 mL/min/[1.73_m2]    GFR Estimate If Black >90 >60 mL/min/[1.73_m2]    Calcium 8.2 (L) 8.5 - 10.1 mg/dL    Bilirubin Total 0.4 0.2 - 1.3 mg/dL    Albumin 4.1 3.4 - 5.0 g/dL    Protein Total 8.1 6.8 - 8.8 g/dL    Alkaline Phosphatase 51 40 - 150 U/L    ALT 15 0 - 50 U/L    AST 19 0 - 45 U/L   Lactic acid whole blood   Result Value Ref Range    Lactic Acid 3.0 (H) 0.7 - 2.0 mmol/L   CK total   Result Value Ref Range    CK Total 87 30 - 225 U/L   Alcohol ethyl   Result Value Ref Range    Ethanol g/dL 0.07 (H) 0.01 g/dL   UA reflex to Microscopic and Culture   Result Value Ref Range    Color Urine Light Yellow     Appearance Urine Clear     Glucose Urine Negative NEG^Negative mg/dL    Bilirubin Urine Negative NEG^Negative    Ketones Urine 10 (A) NEG^Negative mg/dL    Specific Gravity Urine 1.012 1.003 - 1.035    Blood Urine Negative NEG^Negative    pH Urine 6.5 4.7 - 8.0 pH    Protein Albumin Urine Negative NEG^Negative mg/dL    Urobilinogen mg/dL Normal 0.0 - 2.0 mg/dL    Nitrite Urine Negative NEG^Negative    Leukocyte Esterase Urine Negative NEG^Negative    Source Catheterized Urine        Medications   ketorolac (TORADOL) injection 15 mg (15 mg Intravenous Given 10/14/19 2246)   HYDROmorphone (PF) (DILAUDID) injection 0.5 mg (0.5 mg Intravenous Given 10/14/19 2245)   ondansetron (ZOFRAN)  injection 4 mg (4 mg Intravenous Given 10/14/19 2246)   HYDROmorphone (PF) (DILAUDID) injection 0.5 mg (0.5 mg Intravenous Given 10/15/19 0008)       Assessments & Plan (with Medical Decision Making)   The patient is a 69 year old who fell sustaining a right femoral neck fracture.    1) Fall with right femoral neck fracture - no anemia. Moderate leukocytosis in setting of acute stress. Mild elevation of lactic acid with EtOH use at 0.07. No elevation of CK. No evidence of acute cystitis. No acute ST changes on EKG. Normal CXR. Patient will be transferred to [facility] for further evaluation and treatment.    I have reviewed the nursing notes.    I have reviewed the findings, diagnosis, plan and need for follow up with the patient.    New Prescriptions    No medications on file       Final diagnoses:   Fall on stairs, initial encounter   Closed displaced fracture of right femoral neck (H)       10/14/2019   [facility]     Kj Lerma MD  10/15/19 0014       Kj Lerma MD  10/15/19 0016

## 2019-10-15 NOTE — ED TRIAGE NOTES
Pt stated she fell on her stairs outside, around 1700.  She did not have her phone on her, so she had to crawl in to the house to call her daughter.  Daughter brought patient in by car.  Pt RLE shortened, and rotates laterally.  10/10 pain.

## 2019-10-25 ENCOUNTER — TELEPHONE (OUTPATIENT)
Dept: FAMILY MEDICINE | Facility: OTHER | Age: 69
End: 2019-10-25

## 2019-10-25 NOTE — TELEPHONE ENCOUNTER
Left daughter message. Told her she could call back or I will have Dr. Bhatt nurse call her back on Monday when they return to the office.    Marilyn LOPEZ LPN

## 2019-10-25 NOTE — TELEPHONE ENCOUNTER
To: Dr. David nurse  Please call daughter back to discuss her moving into nursing home and her medications.  Her phone is [phone].  Thank you

## 2019-11-07 ENCOUNTER — TELEPHONE (OUTPATIENT)
Dept: FAMILY MEDICINE | Facility: OTHER | Age: 69
End: 2019-11-07

## 2019-11-07 NOTE — TELEPHONE ENCOUNTER
[facility] calling for verbal orders for home care.    Please call Ester [phone]    Elham Tomlin LPN

## 2019-11-11 ENCOUNTER — TELEPHONE (OUTPATIENT)
Dept: FAMILY MEDICINE | Facility: OTHER | Age: 69
End: 2019-11-11

## 2019-11-11 DIAGNOSIS — S72.001A CLOSED DISPLACED FRACTURE OF RIGHT FEMORAL NECK (H): Primary | ICD-10-CM

## 2019-11-11 DIAGNOSIS — R21 RASH AND NONSPECIFIC SKIN ERUPTION: ICD-10-CM

## 2019-11-11 RX ORDER — HYDROCODONE BITARTRATE AND ACETAMINOPHEN 5; 325 MG/1; MG/1
2 TABLET ORAL
Qty: 20 TABLET | Refills: 0 | OUTPATIENT
Start: 2019-11-11

## 2019-11-11 RX ORDER — HYDROCODONE BITARTRATE AND ACETAMINOPHEN 5; 325 MG/1; MG/1
1 TABLET ORAL ONCE
Status: CANCELLED | OUTPATIENT
Start: 2019-11-11 | End: 2019-11-11

## 2019-11-11 NOTE — TELEPHONE ENCOUNTER
Patient reported taking this medication, but the ER discontinued it on 10/14 and prescribed her Oxycodone. Patient states oxycodone makes her sleepy and would like to take norco again. Also reported she takes 2 tabs every 3 hours.   Patient's phone number is [phone]    Norco      Last Written Prescription Date:  07/31/18  Last Fill Quantity: 20,   # refills: 0  Last Office Visit: 07/31/2018  Future Office visit:

## 2019-11-11 NOTE — TELEPHONE ENCOUNTER
I'm not sure what this is about but I need to see her before I prescribe anything.  Thanks. Pelon David MD

## 2019-11-11 NOTE — TELEPHONE ENCOUNTER
Please advise.  Received #30 Oxycodone on 11.6.19.  Pt is requesting Norco 5/325mg 2 po q 3 hrs prn - way over Tylenol recommendations.

## 2019-11-12 NOTE — TELEPHONE ENCOUNTER
Pt was notified and states she is getting a refill of oxycodone.  I explained that she she shouldn't be taking oxycodone and norco at the same time.  She states that she wanted to stop oxycodone and take norco instead.  I offered an appt tomorrow and she states she does not have a ride.

## 2019-11-13 RX ORDER — HYDROCODONE BITARTRATE AND ACETAMINOPHEN 5; 325 MG/1; MG/1
1 TABLET ORAL 3 TIMES DAILY PRN
Qty: 20 TABLET | Refills: 0 | Status: SHIPPED | OUTPATIENT
Start: 2019-11-13 | End: 2019-12-09

## 2019-11-19 DIAGNOSIS — Z53.9 PERSONS ENCOUNTERING HEALTH SERVICES FOR SPECIFIC PROCEDURES, NOT CARRIED OUT: Primary | ICD-10-CM

## 2019-11-19 PROCEDURE — G0180 MD CERTIFICATION HHA PATIENT: HCPCS | Performed by: FAMILY MEDICINE

## 2019-12-06 NOTE — PROGRESS NOTES
Subjective     Raquel Sanchez is a 69 year old female who presents to clinic today for the following health issues:    HPI   Depression and Anxiety Follow-Up    How are you doing with your depression since your last visit? Worsened     How are you doing with your anxiety since your last visit?  Worsened     Are you having other symptoms that might be associated with depression or anxiety? No    Have you had a significant life event? Health Concerns and OTHER: hip fracture     Do you have any concerns with your use of alcohol or other drugs? No    Social History     Tobacco Use     Smoking status: Current Every Day Smoker     Packs/day: 1.00     Types: Cigarettes     Smokeless tobacco: Never Used   Substance Use Topics     Alcohol use: Yes     Alcohol/week: 0.0 standard drinks     Comment: occasional     Drug use: No     PHQ 11/29/2017 1/10/2018 7/31/2018   PHQ-9 Total Score 3 5 4   Q9: Thoughts of better off dead/self-harm past 2 weeks Not at all Not at all Not at all     MANUELA-7 SCORE 11/29/2017 1/10/2018 7/31/2018   Total Score 5 16 12     Last PHQ-9 12/9/2019   1.  Little interest or pleasure in doing things 1   2.  Feeling down, depressed, or hopeless 1   3.  Trouble falling or staying asleep, or sleeping too much 1   4.  Feeling tired or having little energy 0   5.  Poor appetite or overeating -   6.  Feeling bad about yourself 3   7.  Trouble concentrating 0   8.  Moving slowly or restless 0   Q9: Thoughts of better off dead/self-harm past 2 weeks 0   PHQ-9 Total Score -   Difficulty at work, home, or with people -     MANUELA-7  12/9/2019   1. Feeling nervous, anxious, or on edge 3   2. Not being able to stop or control worrying 3   3. Worrying too much about different things 3   4. Trouble relaxing 3   5. Being so restless that it is hard to sit still 2   6. Becoming easily annoyed or irritable 3   7. Feeling afraid, as if something awful might happen 3   MANUELA-7 Total Score 20   If you checked any problems, how  difficult have they made it for you to do your work, take care of things at home, or get along with other people? Somewhat difficult         Suicide Assessment Five-step Evaluation and Treatment (SAFE-T)      Musculoskeletal problem/pain      Duration: Right hip fracture Oct 2019    Description  Location: right hip    Intensity:  8/10    Accompanying signs and symptoms: none    History  Previous similar problem: YES  Previous evaluation:  x-ray, MRI and orthopedic evaluation    Precipitating or alleviating factors:  Trauma or overuse: YES- fracture  Aggravating factors include: sitting, standing, walking, climbing stairs, lifting, exercise and overuse    Therapies tried and outcome: see medication list      Patient Active Problem List   Diagnosis     ACP (advance care planning)     Controlled substance agreement signed     Dysthymia     Chronic, continuous use of opioids     Past Surgical History:   Procedure Laterality Date     GYN SURGERY      hysterectomy       Social History     Tobacco Use     Smoking status: Current Every Day Smoker     Packs/day: 1.00     Types: Cigarettes     Smokeless tobacco: Never Used   Substance Use Topics     Alcohol use: Yes     Alcohol/week: 0.0 standard drinks     Comment: occasional     Family History   Problem Relation Age of Onset     Diabetes No family hx of      Asthma No family hx of      Thyroid Disease No family hx of      Hypertension No family hx of      Hyperlipidemia No family hx of          Current Outpatient Medications   Medication Sig Dispense Refill     acetaminophen (TYLENOL) 325 MG tablet Take 325 mg by mouth every 6 hours as needed for mild pain       citalopram (CELEXA) 40 MG tablet TAKE 1 TABLET BY MOUTH EVERY DAY 90 tablet 3     clonazePAM (KLONOPIN) 0.5 MG tablet 1 tab bid prn 60 tablet 5     fluocinonide (LIDEX) 0.05 % external cream as directed, dispense 1 tube. refills [id number]       HYDROcodone-acetaminophen (NORCO) 5-325 MG tablet Take 1 tablet by  mouth 3 times daily as needed for pain 30 tablet 0     hydrOXYzine (VISTARIL) 25 MG capsule Take 1 capsule (25 mg) by mouth 3 times daily as needed for itching 120 capsule 1     Multiple Vitamins-Minerals (MULTIVITAMIN ADULTS 50+) TABS Take 1 tablet by mouth daily       omeprazole (PRILOSEC) 20 MG DR capsule TAKE 1 CAPSULE BY MOUTH EVERY DAY BEFORE A MEAL DO NOT CRUSH  0     Prenatal Vit-Fe Fumarate-FA (PREPLUS) 27-1 MG TABS Take 1 tablet by mouth daily  0     triamcinolone (ARISTOCORT HP) 0.5 % external cream APPLY SPARINGLY TO AFFECTED AREA THREE TIMES DAILY. 30 g 1     Allergies   Allergen Reactions     Tape [Adhesive Tape] Blisters     Seasonal Allergies      Poison Ivy Extract [Extract Of Poison Ivy] Rash       PROBLEMS TO ADD ON...  Reviewed and updated as needed this visit by Provider         Review of Systems   ROS COMP: Constitutional, HEENT, cardiovascular, pulmonary, gi and gu systems are negative, except as otherwise noted.      Objective    There were no vitals taken for this visit.  There is no height or weight on file to calculate BMI.  Physical Exam   GENERAL: healthy, alert and no distress  NECK: no adenopathy, no asymmetry, masses, or scars and thyroid normal to palpation  RESP: lungs clear to auscultation - no rales, rhonchi or wheezes  CV: regular rate and rhythm, normal S1 S2, no S3 or S4, no murmur, click or rub, no peripheral edema and peripheral pulses strong  ABDOMEN: soft, nontender, no hepatosplenomegaly, no masses and bowel sounds normal  MS: no gross musculoskeletal defects noted, no edema    Diagnostic Test Results:  Labs reviewed in Epic        Assessment & Plan       ICD-10-CM    1. Other chronic pain G89.29 Pain Drug Scr UR W Rptd Meds   2. Lipid screening Z13.220 Comprehensive metabolic panel     Lipid Profile   3. Chronic, continuous use of opioids F11.90    4. Dysthymia F34.1 citalopram (CELEXA) 40 MG tablet   5. S/P right hip fracture Z87.81 XR Hip Right 2-3 Views   6. Closed  displaced fracture of right femoral neck (H) S72.001A HYDROcodone-acetaminophen (NORCO) 5-325 MG tablet   7. Anxiety F41.9 clonazePAM (KLONOPIN) 0.5 MG tablet     Lengthy review today.  Hip xray looks ok.  Lots of ongoing pain.  Sent 30 lortab to use for the next several days.  Klonopin refilled.  She has been off and way more anxious.  Full labs including UDS.  F/u with ongoing concerns.       Tobacco Cessation:   reports that she has been smoking cigarettes. She has been smoking about 1.00 pack per day. She has never used smokeless tobacco.  Tobacco Cessation Action Plan: Information offered: Patient not interested at this time          No follow-ups on file.    Pelon David MD  [facility]

## 2019-12-09 ENCOUNTER — OFFICE VISIT (OUTPATIENT)
Dept: FAMILY MEDICINE | Facility: OTHER | Age: 69
End: 2019-12-09
Attending: FAMILY MEDICINE
Payer: MEDICARE

## 2019-12-09 ENCOUNTER — ANCILLARY PROCEDURE (OUTPATIENT)
Dept: GENERAL RADIOLOGY | Facility: OTHER | Age: 69
End: 2019-12-09
Attending: FAMILY MEDICINE
Payer: MEDICARE

## 2019-12-09 VITALS
RESPIRATION RATE: 20 BRPM | BODY MASS INDEX: 22.5 KG/M2 | HEART RATE: 122 BPM | SYSTOLIC BLOOD PRESSURE: 152 MMHG | OXYGEN SATURATION: 98 % | WEIGHT: 125 LBS | DIASTOLIC BLOOD PRESSURE: 92 MMHG

## 2019-12-09 DIAGNOSIS — Z87.81 S/P RIGHT HIP FRACTURE: ICD-10-CM

## 2019-12-09 DIAGNOSIS — S72.001A CLOSED DISPLACED FRACTURE OF RIGHT FEMORAL NECK (H): ICD-10-CM

## 2019-12-09 DIAGNOSIS — F34.1 DYSTHYMIA: ICD-10-CM

## 2019-12-09 DIAGNOSIS — Z13.220 LIPID SCREENING: ICD-10-CM

## 2019-12-09 DIAGNOSIS — F11.90 CHRONIC, CONTINUOUS USE OF OPIOIDS: ICD-10-CM

## 2019-12-09 DIAGNOSIS — G89.29 OTHER CHRONIC PAIN: Primary | ICD-10-CM

## 2019-12-09 DIAGNOSIS — F41.9 ANXIETY: ICD-10-CM

## 2019-12-09 PROCEDURE — 80307 DRUG TEST PRSMV CHEM ANLYZR: CPT | Mod: ZL | Performed by: FAMILY MEDICINE

## 2019-12-09 PROCEDURE — G0463 HOSPITAL OUTPT CLINIC VISIT: HCPCS

## 2019-12-09 PROCEDURE — 99214 OFFICE O/P EST MOD 30 MIN: CPT | Performed by: FAMILY MEDICINE

## 2019-12-09 PROCEDURE — 80061 LIPID PANEL: CPT | Mod: ZL | Performed by: FAMILY MEDICINE

## 2019-12-09 PROCEDURE — 36415 COLL VENOUS BLD VENIPUNCTURE: CPT | Mod: ZL | Performed by: FAMILY MEDICINE

## 2019-12-09 PROCEDURE — 80053 COMPREHEN METABOLIC PANEL: CPT | Mod: ZL | Performed by: FAMILY MEDICINE

## 2019-12-09 PROCEDURE — 73502 X-RAY EXAM HIP UNI 2-3 VIEWS: CPT | Mod: TC,FY

## 2019-12-09 RX ORDER — CITALOPRAM HYDROBROMIDE 40 MG/1
TABLET ORAL
Qty: 90 TABLET | Refills: 3 | Status: SHIPPED | OUTPATIENT
Start: 2019-12-09 | End: 2021-02-17

## 2019-12-09 RX ORDER — CLONAZEPAM 0.5 MG/1
TABLET ORAL
Qty: 60 TABLET | Refills: 5 | Status: SHIPPED | OUTPATIENT
Start: 2019-12-09 | End: 2020-06-15

## 2019-12-09 RX ORDER — PNV,CALCIUM 72/IRON/FOLIC ACID 27 MG-1 MG
1 TABLET ORAL DAILY
Refills: 0 | COMMUNITY
Start: 2019-11-05 | End: 2020-09-01

## 2019-12-09 RX ORDER — HYDROCODONE BITARTRATE AND ACETAMINOPHEN 5; 325 MG/1; MG/1
1 TABLET ORAL 3 TIMES DAILY PRN
Qty: 30 TABLET | Refills: 0 | Status: SHIPPED | OUTPATIENT
Start: 2019-12-09 | End: 2020-09-01

## 2019-12-09 RX ORDER — FLUOCINONIDE 0.5 MG/G
CREAM TOPICAL
COMMUNITY
Start: 2007-06-18 | End: 2020-09-01

## 2019-12-09 ASSESSMENT — PAIN SCALES - GENERAL: PAINLEVEL: EXTREME PAIN (8)

## 2019-12-09 ASSESSMENT — ANXIETY QUESTIONNAIRES
5. BEING SO RESTLESS THAT IT IS HARD TO SIT STILL: MORE THAN HALF THE DAYS
6. BECOMING EASILY ANNOYED OR IRRITABLE: NEARLY EVERY DAY
2. NOT BEING ABLE TO STOP OR CONTROL WORRYING: NEARLY EVERY DAY
1. FEELING NERVOUS, ANXIOUS, OR ON EDGE: NEARLY EVERY DAY
IF YOU CHECKED OFF ANY PROBLEMS ON THIS QUESTIONNAIRE, HOW DIFFICULT HAVE THESE PROBLEMS MADE IT FOR YOU TO DO YOUR WORK, TAKE CARE OF THINGS AT HOME, OR GET ALONG WITH OTHER PEOPLE: SOMEWHAT DIFFICULT
7. FEELING AFRAID AS IF SOMETHING AWFUL MIGHT HAPPEN: NEARLY EVERY DAY
GAD7 TOTAL SCORE: 20
3. WORRYING TOO MUCH ABOUT DIFFERENT THINGS: NEARLY EVERY DAY

## 2019-12-09 ASSESSMENT — PATIENT HEALTH QUESTIONNAIRE - PHQ9: 5. POOR APPETITE OR OVEREATING: NEARLY EVERY DAY

## 2019-12-09 NOTE — NURSING NOTE
"Chief Complaint   Patient presents with     RECHECK       Initial BP (!) 152/92   Pulse 122   Resp 20   Wt 56.7 kg (125 lb)   SpO2 98%   BMI 22.50 kg/m   Estimated body mass index is 22.5 kg/m  as calculated from the following:    Height as of 7/31/18: 1.588 m (5' 2.5\").    Weight as of this encounter: 56.7 kg (125 lb).  Medication Reconciliation: complete  Alma Delia Gonzalez LPN    "

## 2019-12-10 ENCOUNTER — TELEPHONE (OUTPATIENT)
Dept: FAMILY MEDICINE | Facility: OTHER | Age: 69
End: 2019-12-10

## 2019-12-10 LAB
ALBUMIN SERPL-MCNC: 3.9 G/DL (ref 3.4–5)
ALP SERPL-CCNC: 65 U/L (ref 40–150)
ALT SERPL W P-5'-P-CCNC: 10 U/L (ref 0–50)
ANION GAP SERPL CALCULATED.3IONS-SCNC: 8 MMOL/L (ref 3–14)
AST SERPL W P-5'-P-CCNC: 11 U/L (ref 0–45)
BILIRUB SERPL-MCNC: 0.3 MG/DL (ref 0.2–1.3)
BUN SERPL-MCNC: 8 MG/DL (ref 7–30)
CALCIUM SERPL-MCNC: 8.9 MG/DL (ref 8.5–10.1)
CHLORIDE SERPL-SCNC: 98 MMOL/L (ref 94–109)
CHOLEST SERPL-MCNC: 246 MG/DL
CO2 SERPL-SCNC: 25 MMOL/L (ref 20–32)
CREAT SERPL-MCNC: 0.5 MG/DL (ref 0.52–1.04)
GFR SERPL CREATININE-BSD FRML MDRD: >90 ML/MIN/{1.73_M2}
GLUCOSE SERPL-MCNC: 101 MG/DL (ref 70–99)
HDLC SERPL-MCNC: 64 MG/DL
LDLC SERPL CALC-MCNC: 154 MG/DL
NONHDLC SERPL-MCNC: 182 MG/DL
POTASSIUM SERPL-SCNC: 4 MMOL/L (ref 3.4–5.3)
PROT SERPL-MCNC: 8 G/DL (ref 6.8–8.8)
SODIUM SERPL-SCNC: 131 MMOL/L (ref 133–144)
TRIGL SERPL-MCNC: 142 MG/DL

## 2019-12-10 ASSESSMENT — PATIENT HEALTH QUESTIONNAIRE - PHQ9: SUM OF ALL RESPONSES TO PHQ QUESTIONS 1-9: 7

## 2019-12-10 ASSESSMENT — ANXIETY QUESTIONNAIRES: GAD7 TOTAL SCORE: 20

## 2019-12-10 NOTE — TELEPHONE ENCOUNTER
Patient wants Xrays pushed to [facility].  She has an appointment with her surgeon there tomorrow at 831

## 2019-12-11 ENCOUNTER — TRANSFERRED RECORDS (OUTPATIENT)
Dept: HEALTH INFORMATION MANAGEMENT | Facility: CLINIC | Age: 69
End: 2019-12-11

## 2019-12-13 LAB — PAIN DRUG SCR UR W RPTD MEDS: NORMAL

## 2020-03-02 ENCOUNTER — HEALTH MAINTENANCE LETTER (OUTPATIENT)
Age: 70
End: 2020-03-02

## 2020-05-13 NOTE — LETTER
My Depression Action Plan  Name: Raquel Sanchez   Date of Birth 1950  Date: 7/31/2018    My doctor: Pelon David   My clinic: [address]  [facility] [address]  [phone]          GREEN    ZONE   Good Control    What it looks like:     Things are going generally well. You have normal up s and down s. You may even feel depressed from time to time, but bad moods usually last less than a day.   What you need to do:  1. Continue to care for yourself (see self care plan)  2. Check your depression survival kit and update it as needed  3. Follow your physician s recommendations including any medication.  4. Do not stop taking medication unless you consult with your physician first.           YELLOW         ZONE Getting Worse    What it looks like:     Depression is starting to interfere with your life.     It may be hard to get out of bed; you may be starting to isolate yourself from others.    Symptoms of depression are starting to last most all day and this has happened for several days.     You may have suicidal thoughts but they are not constant.   What you need to do:     1. Call your care team, your response to treatment will improve if you keep your care team informed of your progress. Yellow periods are signs an adjustment may need to be made.     2. Continue your self-care, even if you have to fake it!    3. Talk to someone in your support network    4. Open up your depression survival kit           RED    ZONE Medical Alert - Get Help    What it looks like:     Depression is seriously interfering with your life.     You may experience these or other symptoms: You can t get out of bed most days, can t work or engage in other necessary activities, you have trouble taking care of basic hygiene, or basic responsibilities, thoughts of suicide or death that will not go away, self-injurious behavior.     What you need to do:  1. Call your care team and request a  Glucose 96. Nurse aware      Ambrose Trevizo  05/13/20 0000     same-day appointment. If they are not available (weekends or after hours) call your local crisis line, emergency room or 911.            Depression Self Care Plan / Survival Kit    Self-Care for Depression  Here s the deal. Your body and mind are really not as separate as most people think.  What you do and think affects how you feel and how you feel influences what you do and think. This means if you do things that people who feel good do, it will help you feel better.  Sometimes this is all it takes.  There is also a place for medication and therapy depending on how severe your depression is, so be sure to consult with your medical provider and/ or Behavioral Health Consultant if your symptoms are worsening or not improving.     In order to better manage my stress, I will:    Exercise  Get some form of exercise, every day. This will help reduce pain and release endorphins, the  feel good  chemicals in your brain. This is almost as good as taking antidepressants!  This is not the same as joining a gym and then never going! (they count on that by the way ) It can be as simple as just going for a walk or doing some gardening, anything that will get you moving.      Hygiene   Maintain good hygiene (Get out of bed in the morning, Make your bed, Brush your teeth, Take a shower, and Get dressed like you were going to work, even if you are unemployed).  If your clothes don't fit try to get ones that do.    Diet  I will strive to eat foods that are good for me, drink plenty of water, and avoid excessive sugar, caffeine, alcohol, and other mood-altering substances.  Some foods that are helpful in depression are: complex carbohydrates, B vitamins, flaxseed, fish or fish oil, fresh fruits and vegetables.    Psychotherapy  I agree to participate in Individual Therapy (if recommended).    Medication  If prescribed medications, I agree to take them.  Missing doses can result in serious side effects.  I understand that drinking  alcohol, or other illicit drug use, may cause potential side effects.  I will not stop my medication abruptly without first discussing it with my provider.    Staying Connected With Others  I will stay in touch with my friends, family members, and my primary care provider/team.    Use your imagination  Be creative.  We all have a creative side; it doesn t matter if it s oil painting, sand castles, or mud pies! This will also kick up the endorphins.    Witness Beauty  (AKA stop and smell the roses) Take a look outside, even in mid-winter. Notice colors, textures. Watch the squirrels and birds.     Service to others  Be of service to others.  There is always someone else in need.  By helping others we can  get out of ourselves  and remember the really important things.  This also provides opportunities for practicing all the other parts of the program.    Humor  Laugh and be silly!  Adjust your TV habits for less news and crime-drama and more comedy.    Control your stress  Try breathing deep, massage therapy, biofeedback, and meditation. Find time to relax each day.     My support system    Clinic Contact:  Phone number:    Contact 1:  Phone number:    Contact 2:  Phone number:    Confucianist/:  Phone number:    Therapist:  Phone number:    Local crisis center:    Phone number:    Other community support:  Phone number:

## 2020-06-12 DIAGNOSIS — F41.9 ANXIETY: ICD-10-CM

## 2020-06-15 RX ORDER — CLONAZEPAM 0.5 MG/1
TABLET ORAL
Qty: 60 TABLET | Refills: 0 | Status: SHIPPED | OUTPATIENT
Start: 2020-06-15 | End: 2020-07-29

## 2020-06-15 NOTE — TELEPHONE ENCOUNTER
clonazepam      Last Written Prescription Date:  12/9/19  Last Fill Quantity: 60,   # refills: 5  Last Office Visit: 12/9/19  Future Office visit:       Routing refill request to provider for review/approval because:  Drug not on the G, P or [facility] refill protocol or controlled substance

## 2020-07-26 DIAGNOSIS — R21 RASH: ICD-10-CM

## 2020-07-30 RX ORDER — HYDROXYZINE PAMOATE 25 MG/1
CAPSULE ORAL
Qty: 120 CAPSULE | Refills: 1 | OUTPATIENT
Start: 2020-07-30

## 2020-08-21 NOTE — PROGRESS NOTES
Subjective     Raquel Sanchez is a 70 year old female who presents to clinic today for the following health issues:    HPI       Anxiety Follow-Up    How are you doing with your anxiety since your last visit? Worsened taking less klonopin    Are you having other symptoms that might be associated with anxiety? Yes:  anxious, worrying about     Have you had a significant life event? No     Are you feeling depressed? Yes:  occasionally     Do you have any concerns with your use of alcohol or other drugs? No    Social History     Tobacco Use     Smoking status: Current Every Day Smoker     Packs/day: 1.00     Types: Cigarettes     Smokeless tobacco: Never Used   Substance Use Topics     Alcohol use: Yes     Alcohol/week: 0.0 standard drinks     Comment: occasional     Drug use: No     MNAUELA-7 SCORE 7/31/2018 12/9/2019 9/1/2020   Total Score 12 20 11     PHQ 12/9/2019 12/10/2019 9/1/2020   PHQ-9 Total Score - 7 6   Q9: Thoughts of better off dead/self-harm past 2 weeks Not at all Not at all Not at all     Last PHQ-9 9/1/2020   1.  Little interest or pleasure in doing things 1   2.  Feeling down, depressed, or hopeless 1   3.  Trouble falling or staying asleep, or sleeping too much 3   4.  Feeling tired or having little energy 1   5.  Poor appetite or overeating 0   6.  Feeling bad about yourself 0   7.  Trouble concentrating 0   8.  Moving slowly or restless 0   Q9: Thoughts of better off dead/self-harm past 2 weeks 0   PHQ-9 Total Score 6   Difficulty at work, home, or with people Somewhat difficult     MANUELA-7  9/1/2020   1. Feeling nervous, anxious, or on edge 2   2. Not being able to stop or control worrying 2   3. Worrying too much about different things 2   4. Trouble relaxing 2   5. Being so restless that it is hard to sit still 1   6. Becoming easily annoyed or irritable 0   7. Feeling afraid, as if something awful might happen 2   MANUELA-7 Total Score 11   If you checked any problems, how difficult have they  "made it for you to do your work, take care of things at home, or get along with other people? -         Lengthy review today.  Doing ok overall.  Using hydroxyzine and the klonopin.  Mood ok.  Hip pain ongoing.  Tylenol less effective.  Reviewed options.      Review of Systems   Constitutional, HEENT, cardiovascular, pulmonary, gi and gu systems are negative, except as otherwise noted.      Objective    BP (!) 144/90   Pulse 109   Temp 97.5  F (36.4  C)   Ht 1.575 m (5' 2\")   Wt 65.3 kg (144 lb)   SpO2 98%   BMI 26.34 kg/m    Body mass index is 26.34 kg/m .  Physical Exam   GENERAL: healthy, alert and no distress  NECK: no adenopathy, no asymmetry, masses, or scars and thyroid normal to palpation  RESP: lungs clear to auscultation - no rales, rhonchi or wheezes  CV: regular rate and rhythm, normal S1 S2, no S3 or S4, no murmur, click or rub, no peripheral edema and peripheral pulses strong  ABDOMEN: soft, nontender, no hepatosplenomegaly, no masses and bowel sounds normal  MS: no gross musculoskeletal defects noted, no edema            Assessment & Plan     Anxiety  Stable and ongoing.  Continue same klonopin, and I sent for 6 months.    - clonazePAM (KLONOPIN) 0.5 MG tablet; Take 1 tablet (0.5 mg) by mouth 2 times daily as needed for anxiety    Rash  As above.  This helps.    - hydrOXYzine (VISTARIL) 25 MG capsule; Take 1 capsule (25 mg) by mouth 3 times daily as needed for itching    Hip pain, right  Reviewed options.  Consider workup, but for now trial of meloxicam with breakfast to see if this helps a lot.    - meloxicam (MOBIC) 15 MG tablet; Take 1 tablet (15 mg) by mouth daily       We had a discussion about screenings.  She declines them all.  I didn't push it much.    BMI:   Estimated body mass index is 26.34 kg/m  as calculated from the following:    Height as of this encounter: 1.575 m (5' 2\").    Weight as of this encounter: 65.3 kg (144 lb).               No follow-ups on file.    Pelon JARA" MD Frankie  [facility]

## 2020-09-01 ENCOUNTER — OFFICE VISIT (OUTPATIENT)
Dept: FAMILY MEDICINE | Facility: OTHER | Age: 70
End: 2020-09-01
Attending: FAMILY MEDICINE
Payer: MEDICARE

## 2020-09-01 VITALS
WEIGHT: 144 LBS | HEART RATE: 109 BPM | TEMPERATURE: 97.5 F | DIASTOLIC BLOOD PRESSURE: 90 MMHG | HEIGHT: 62 IN | SYSTOLIC BLOOD PRESSURE: 144 MMHG | BODY MASS INDEX: 26.5 KG/M2 | OXYGEN SATURATION: 98 %

## 2020-09-01 DIAGNOSIS — M25.551 HIP PAIN, RIGHT: Primary | ICD-10-CM

## 2020-09-01 DIAGNOSIS — R21 RASH: ICD-10-CM

## 2020-09-01 DIAGNOSIS — F41.9 ANXIETY: ICD-10-CM

## 2020-09-01 PROCEDURE — G0463 HOSPITAL OUTPT CLINIC VISIT: HCPCS

## 2020-09-01 PROCEDURE — 99214 OFFICE O/P EST MOD 30 MIN: CPT | Performed by: FAMILY MEDICINE

## 2020-09-01 RX ORDER — MELOXICAM 15 MG/1
15 TABLET ORAL DAILY
Qty: 30 TABLET | Refills: 1 | Status: SHIPPED | OUTPATIENT
Start: 2020-09-01 | End: 2021-11-15 | Stop reason: ALTCHOICE

## 2020-09-01 RX ORDER — CLONAZEPAM 0.5 MG/1
0.5 TABLET ORAL 2 TIMES DAILY PRN
Qty: 60 TABLET | Refills: 5 | Status: SHIPPED | OUTPATIENT
Start: 2020-09-01 | End: 2021-03-03

## 2020-09-01 RX ORDER — HYDROXYZINE PAMOATE 25 MG/1
25 CAPSULE ORAL 3 TIMES DAILY PRN
Qty: 120 CAPSULE | Refills: 4 | Status: SHIPPED | OUTPATIENT
Start: 2020-09-01 | End: 2021-11-10

## 2020-09-01 ASSESSMENT — PAIN SCALES - GENERAL: PAINLEVEL: NO PAIN (0)

## 2020-09-01 ASSESSMENT — ANXIETY QUESTIONNAIRES
3. WORRYING TOO MUCH ABOUT DIFFERENT THINGS: MORE THAN HALF THE DAYS
1. FEELING NERVOUS, ANXIOUS, OR ON EDGE: MORE THAN HALF THE DAYS
6. BECOMING EASILY ANNOYED OR IRRITABLE: NOT AT ALL
5. BEING SO RESTLESS THAT IT IS HARD TO SIT STILL: SEVERAL DAYS
2. NOT BEING ABLE TO STOP OR CONTROL WORRYING: MORE THAN HALF THE DAYS
7. FEELING AFRAID AS IF SOMETHING AWFUL MIGHT HAPPEN: MORE THAN HALF THE DAYS
GAD7 TOTAL SCORE: 11

## 2020-09-01 ASSESSMENT — MIFFLIN-ST. JEOR: SCORE: 1126.43

## 2020-09-01 ASSESSMENT — PATIENT HEALTH QUESTIONNAIRE - PHQ9
SUM OF ALL RESPONSES TO PHQ QUESTIONS 1-9: 6
5. POOR APPETITE OR OVEREATING: MORE THAN HALF THE DAYS

## 2020-09-01 NOTE — NURSING NOTE
"Chief Complaint   Patient presents with     Recheck Medication       Initial BP (!) 162/98   Pulse 109   Temp 97.5  F (36.4  C)   Ht 1.575 m (5' 2\")   Wt 65.3 kg (144 lb)   SpO2 98%   BMI 26.34 kg/m   Estimated body mass index is 26.34 kg/m  as calculated from the following:    Height as of this encounter: 1.575 m (5' 2\").    Weight as of this encounter: 65.3 kg (144 lb).  Medication Reconciliation: complete  Nga Martinez LPN  "

## 2020-09-02 ASSESSMENT — ANXIETY QUESTIONNAIRES: GAD7 TOTAL SCORE: 11

## 2020-12-01 DIAGNOSIS — L30.9 DERMATITIS: ICD-10-CM

## 2020-12-02 RX ORDER — TRIAMCINOLONE ACETONIDE 5 MG/G
CREAM TOPICAL
Qty: 30 G | Refills: 1 | Status: SHIPPED | OUTPATIENT
Start: 2020-12-02 | End: 2024-04-10

## 2020-12-02 NOTE — TELEPHONE ENCOUNTER
triamcinolone      Last Written Prescription Date:  08/21/19  Last Fill Quantity: 30g,   # refills: 1  Last Office Visit: 09/01/20

## 2020-12-20 ENCOUNTER — HEALTH MAINTENANCE LETTER (OUTPATIENT)
Age: 70
End: 2020-12-20

## 2021-02-17 DIAGNOSIS — F34.1 DYSTHYMIA: ICD-10-CM

## 2021-02-17 RX ORDER — CITALOPRAM HYDROBROMIDE 40 MG/1
TABLET ORAL
Qty: 90 TABLET | Refills: 3 | Status: SHIPPED | OUTPATIENT
Start: 2021-02-17 | End: 2022-01-24

## 2021-03-02 DIAGNOSIS — F41.9 ANXIETY: ICD-10-CM

## 2021-03-03 RX ORDER — CLONAZEPAM 0.5 MG/1
0.5 TABLET ORAL 2 TIMES DAILY PRN
Qty: 60 TABLET | Refills: 0 | Status: SHIPPED | OUTPATIENT
Start: 2021-03-03 | End: 2021-04-12

## 2021-03-03 NOTE — TELEPHONE ENCOUNTER
clonazePAM (KLONOPIN) 0.5 MG tablet      Last Written Prescription Date:  9/1/20  Last Fill Quantity: 60,   # refills: 5  Last Office Visit: 9/1/20  Future Office visit:       Routing refill request to provider for review/approval because:  Drug not on the [facility] or [facility] refill protocol or controlled substance     reviewed, med last filled 2/2/21 #60, no refills remaining.

## 2021-04-12 DIAGNOSIS — F41.9 ANXIETY: ICD-10-CM

## 2021-04-12 RX ORDER — CLONAZEPAM 0.5 MG/1
0.5 TABLET ORAL 2 TIMES DAILY PRN
Qty: 60 TABLET | Refills: 0 | Status: SHIPPED | OUTPATIENT
Start: 2021-04-12 | End: 2021-05-18

## 2021-04-18 ENCOUNTER — HEALTH MAINTENANCE LETTER (OUTPATIENT)
Age: 71
End: 2021-04-18

## 2021-05-18 DIAGNOSIS — F41.9 ANXIETY: ICD-10-CM

## 2021-05-18 RX ORDER — CLONAZEPAM 0.5 MG/1
0.5 TABLET ORAL 2 TIMES DAILY PRN
Qty: 60 TABLET | Refills: 5 | Status: SHIPPED | OUTPATIENT
Start: 2021-05-18 | End: 2021-11-22

## 2021-05-18 NOTE — TELEPHONE ENCOUNTER
Please advise    clonazePAM (KLONOPIN) 0.5 MG tablet      Last Written Prescription Date:  4/12/21  Last Fill Quantity: 60,   # refills: 0  Last Office Visit: 9/1/20  Future Office visit:       Routing refill request to provider for review/approval because:  Drug not on the [facility], [facility] or [facility] refill protocol or controlled substance

## 2021-09-28 PROBLEM — E87.1 HYPONATREMIA: Status: ACTIVE | Noted: 2019-10-15

## 2021-09-28 PROBLEM — Z01.818 PRE-OP EXAM: Status: ACTIVE | Noted: 2019-10-15
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
## 2021-09-28 PROBLEM — S72.001A CLOSED RIGHT HIP FRACTURE, INITIAL ENCOUNTER (H): Status: ACTIVE | Noted: 2019-10-15
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
## 2021-09-28 PROBLEM — F10.10 ALCOHOL ABUSE, UNCOMPLICATED: Status: ACTIVE | Noted: 2019-10-15

## 2021-10-03 ENCOUNTER — HEALTH MAINTENANCE LETTER (OUTPATIENT)
Age: 71
End: 2021-10-03

## 2021-10-14 ENCOUNTER — TELEPHONE (OUTPATIENT)
Dept: FAMILY MEDICINE | Facility: OTHER | Age: 71
End: 2021-10-14
Payer: MEDICARE

## 2021-10-14 NOTE — TELEPHONE ENCOUNTER
Notified daughter of below; daughter stated pt would not go into [facility] and wanted to wait to see Dr forrester. No openings in [place]; scheduled pt for 11/11 with dr forrester

## 2021-10-14 NOTE — TELEPHONE ENCOUNTER
10:16 AM    Reason for Call: OVERBOOK    Patient is having the following symptoms: Having serve pain right hip and back for about 2 weeks ago and patient message daughter last night saying she can't stand the pain wants to be seen today.    The patient is requesting an appointment for TODAY with Dr David.    Was an appointment offered for this call? No  If yes : Appointment type              Date    Preferred method for responding to this message: Telephone Call  What is your phone number ? Call daughter Jess [phone]    If we cannot reach you directly, may we leave a detailed response at the number you provided? Yes    Can this message wait until your PCP/provider returns, if unavailable today? NO     Ester Borrego

## 2021-11-08 DIAGNOSIS — R21 RASH: ICD-10-CM

## 2021-11-10 RX ORDER — HYDROXYZINE PAMOATE 25 MG/1
25 CAPSULE ORAL 3 TIMES DAILY PRN
Qty: 120 CAPSULE | Refills: 3 | Status: SHIPPED | OUTPATIENT
Start: 2021-11-10 | End: 2022-11-22

## 2021-11-12 NOTE — PROGRESS NOTES
Assessment & Plan     Lipid screening  Update annual labs.    - Lipid Profile; Future  - Comprehensive metabolic panel; Future  - Lipid Profile  - Comprehensive metabolic panel    Lumbar radiculopathy  Right sided.  Also some hip pain as well.  We reviewed options at some length.  Went with steroid burst and PT trial.  xrays do show concern for AAA; getting us.    - predniSONE (DELTASONE) 20 MG tablet; Take 1 tablet (20 mg) by mouth 2 times daily for 5 days  - Physical Therapy Referral; Future  - XR Hip Right 2-3 Views (Clinic Performed); Future  - XR LUMBAR SPINE 2/3 VIEWS (Clinic Performed); Future    Dysthymia  Reveiwed.  Stable.  No change.      Benign essential hypertension  Not controlled.  Add norvasc.  4 week f/u.    - amLODIPine (NORVASC) 10 MG tablet; Take 1 tablet (10 mg) by mouth daily    Hip pain, right  Back vs. Hip.  Consider a bursitis.  We tried the steroid first orally with some PT.    - XR Hip Right 2-3 Views (Clinic Performed); Future  - XR LUMBAR SPINE 2/3 VIEWS (Clinic Performed); Future      30 minutes spent on the date of the encounter doing chart review, patient visit and documentation        Tobacco Cessation:   reports that she has been smoking cigarettes. She started smoking about 55 years ago. She has a 55.00 pack-year smoking history. She has never used smokeless tobacco.          No follow-ups on file.    Pelon David MD  [facility]   Raquel is a 71 year old who presents for the following health issues  accompanied by her daughter.    HPI     Musculoskeletal problem/pain      Duration: ongoing    Description  Location: right hip    Intensity:  moderate    Accompanying signs and symptoms: burning and pain goes into low back     History  Previous similar problem: YES  Previous evaluation:  x-ray    Precipitating or alleviating factors:  Trauma or overuse: no   Aggravating factors include: standing and walking    Therapies tried and outcome:  acetaminophen           Review of Systems   Constitutional, HEENT, cardiovascular, pulmonary, gi and gu systems are negative, except as otherwise noted.      Objective    BP (!) 168/92   Pulse 107   Temp 98.5  F (36.9  C)   Wt 75.3 kg (166 lb)   SpO2 95%   BMI 30.36 kg/m    Body mass index is 30.36 kg/m .  Physical Exam   GENERAL: healthy, alert and no distress  NECK: no adenopathy, no asymmetry, masses, or scars and thyroid normal to palpation  RESP: lungs clear to auscultation - no rales, rhonchi or wheezes  CV: regular rate and rhythm, normal S1 S2, no S3 or S4, no murmur, click or rub, no peripheral edema and peripheral pulses strong  ABDOMEN: soft, nontender, no hepatosplenomegaly, no masses and bowel sounds normal  MS: walks with limp favoring the back and right leg.      xrays reviewed.  Labs pending.

## 2021-11-15 ENCOUNTER — ANCILLARY PROCEDURE (OUTPATIENT)
Dept: GENERAL RADIOLOGY | Facility: OTHER | Age: 71
End: 2021-11-15
Attending: FAMILY MEDICINE
Payer: MEDICARE

## 2021-11-15 ENCOUNTER — OFFICE VISIT (OUTPATIENT)
Dept: FAMILY MEDICINE | Facility: OTHER | Age: 71
End: 2021-11-15
Attending: FAMILY MEDICINE
Payer: MEDICARE

## 2021-11-15 VITALS
HEART RATE: 107 BPM | TEMPERATURE: 98.5 F | DIASTOLIC BLOOD PRESSURE: 96 MMHG | BODY MASS INDEX: 30.36 KG/M2 | OXYGEN SATURATION: 95 % | WEIGHT: 166 LBS | SYSTOLIC BLOOD PRESSURE: 158 MMHG

## 2021-11-15 DIAGNOSIS — I10 BENIGN ESSENTIAL HYPERTENSION: ICD-10-CM

## 2021-11-15 DIAGNOSIS — M25.551 HIP PAIN, RIGHT: ICD-10-CM

## 2021-11-15 DIAGNOSIS — F34.1 DYSTHYMIA: ICD-10-CM

## 2021-11-15 DIAGNOSIS — Z13.220 LIPID SCREENING: ICD-10-CM

## 2021-11-15 DIAGNOSIS — M54.16 LUMBAR RADICULOPATHY: Primary | ICD-10-CM

## 2021-11-15 DIAGNOSIS — M54.16 LUMBAR RADICULOPATHY: ICD-10-CM

## 2021-11-15 LAB
ALBUMIN SERPL-MCNC: 3.2 G/DL (ref 3.4–5)
ALP SERPL-CCNC: 60 U/L (ref 40–150)
ALT SERPL W P-5'-P-CCNC: 10 U/L (ref 0–50)
ANION GAP SERPL CALCULATED.3IONS-SCNC: 7 MMOL/L (ref 3–14)
AST SERPL W P-5'-P-CCNC: 13 U/L (ref 0–45)
BILIRUB SERPL-MCNC: 0.3 MG/DL (ref 0.2–1.3)
BUN SERPL-MCNC: 4 MG/DL (ref 7–30)
CALCIUM SERPL-MCNC: 8.9 MG/DL (ref 8.5–10.1)
CHLORIDE BLD-SCNC: 106 MMOL/L (ref 94–109)
CHOLEST SERPL-MCNC: 271 MG/DL
CO2 SERPL-SCNC: 25 MMOL/L (ref 20–32)
CREAT SERPL-MCNC: 0.6 MG/DL (ref 0.52–1.04)
FASTING STATUS PATIENT QL REPORTED: YES
GFR SERPL CREATININE-BSD FRML MDRD: >90 ML/MIN/1.73M2
GLUCOSE BLD-MCNC: 93 MG/DL (ref 70–99)
HDLC SERPL-MCNC: 47 MG/DL
LDLC SERPL CALC-MCNC: 156 MG/DL
NONHDLC SERPL-MCNC: 224 MG/DL
POTASSIUM BLD-SCNC: 3.8 MMOL/L (ref 3.4–5.3)
PROT SERPL-MCNC: 7.5 G/DL (ref 6.8–8.8)
SODIUM SERPL-SCNC: 138 MMOL/L (ref 133–144)
TRIGL SERPL-MCNC: 339 MG/DL

## 2021-11-15 PROCEDURE — 80061 LIPID PANEL: CPT | Mod: ZL | Performed by: FAMILY MEDICINE

## 2021-11-15 PROCEDURE — 36415 COLL VENOUS BLD VENIPUNCTURE: CPT | Mod: ZL | Performed by: FAMILY MEDICINE

## 2021-11-15 PROCEDURE — G0463 HOSPITAL OUTPT CLINIC VISIT: HCPCS

## 2021-11-15 PROCEDURE — 99214 OFFICE O/P EST MOD 30 MIN: CPT | Performed by: FAMILY MEDICINE

## 2021-11-15 PROCEDURE — 80053 COMPREHEN METABOLIC PANEL: CPT | Mod: ZL | Performed by: FAMILY MEDICINE

## 2021-11-15 PROCEDURE — 73502 X-RAY EXAM HIP UNI 2-3 VIEWS: CPT | Mod: TC,FY

## 2021-11-15 PROCEDURE — 72100 X-RAY EXAM L-S SPINE 2/3 VWS: CPT | Mod: TC,FY

## 2021-11-15 RX ORDER — AMLODIPINE BESYLATE 10 MG/1
10 TABLET ORAL DAILY
Qty: 90 TABLET | Refills: 3 | Status: ON HOLD | OUTPATIENT
Start: 2021-11-15 | End: 2022-06-21

## 2021-11-15 RX ORDER — PREDNISONE 20 MG/1
20 TABLET ORAL 2 TIMES DAILY
Qty: 10 TABLET | Refills: 0 | Status: SHIPPED | OUTPATIENT
Start: 2021-11-15 | End: 2021-11-20

## 2021-11-15 ASSESSMENT — ANXIETY QUESTIONNAIRES
2. NOT BEING ABLE TO STOP OR CONTROL WORRYING: SEVERAL DAYS
6. BECOMING EASILY ANNOYED OR IRRITABLE: NOT AT ALL
GAD7 TOTAL SCORE: 3
5. BEING SO RESTLESS THAT IT IS HARD TO SIT STILL: NOT AT ALL
1. FEELING NERVOUS, ANXIOUS, OR ON EDGE: SEVERAL DAYS
3. WORRYING TOO MUCH ABOUT DIFFERENT THINGS: SEVERAL DAYS
7. FEELING AFRAID AS IF SOMETHING AWFUL MIGHT HAPPEN: NOT AT ALL
IF YOU CHECKED OFF ANY PROBLEMS ON THIS QUESTIONNAIRE, HOW DIFFICULT HAVE THESE PROBLEMS MADE IT FOR YOU TO DO YOUR WORK, TAKE CARE OF THINGS AT HOME, OR GET ALONG WITH OTHER PEOPLE: NOT DIFFICULT AT ALL

## 2021-11-15 ASSESSMENT — PAIN SCALES - GENERAL: PAINLEVEL: MODERATE PAIN (5)

## 2021-11-15 ASSESSMENT — PATIENT HEALTH QUESTIONNAIRE - PHQ9: 5. POOR APPETITE OR OVEREATING: NOT AT ALL

## 2021-11-15 NOTE — PATIENT INSTRUCTIONS
Patient Education     Back Care Tips     Caring for your back  These are things you can do to prevent a recurrence of acute back pain and to reduce symptoms from chronic back pain:    Stay at a healthy weight. If you are overweight, losing weight will help most types of back pain.    Exercise is an important part of recovery from most types of back pain. The muscles behind and in front of the spine support the back. This means strengthening both the back muscles and the abdominal muscles will provide better support for your spine.     Swimming and brisk walking are good overall exercises to improve your fitness level.    Practice safe lifting methods (see below).    Practice good posture when sitting, standing, and walking. Don't sit for a long time. This puts more stress on the lower back than standing or walking.    Wear quality shoes with good arch support. Foot and ankle alignment can affect back symptoms. Don't wear high heels.    Therapeutic massage can help relax the back muscles without stretching them.    During the first 24 to 72 hours after an acute injury or flare-up of chronic back pain, put an ice pack on the painful area for 20 minutes and then remove it for 20 minutes. Do thisover a period of 60 to 90 minutes, or several times a day. As a safety precaution, don't use a heating pad at bedtime. Sleeping on a heating pad can lead to skin burns or tissue damage.    You can alternate using ice and heat.  Medicines  Talk with your healthcare provider before using medicines, especially if you have other health problems or are taking other medicines.    You may use over-the-counter medicines, such as acetaminophen, ibuprofen, or naprosyn to control pain, unless your healthcare provider prescribed other pain medicine. Talk with your healthcare provider before taking any medicines if you have a chronic condition such as diabetes, liver or kidney disease, stomach ulcers, or digestive bleeding, or are taking  blood thinners.    Be careful if you are given prescription pain medicines, opioids, or medicine for muscle spasm. They can cause drowsiness, and affect your coordination, reflexes, and judgment. Don't drive or operate heavy machinery while taking these types of medicines. Take prescription pain medicine only as prescribed by your healthcare provider.  Lumbar stretch  This simple stretch will help relax muscle spasm and keep your back more limber. If exercise makes your back pain worse, don t do it.    Lie on your back with your knees bent and both feet on the ground.    Slowly raise your left knee to your chest as you flatten your lower back against the floor. Hold for 5 seconds.    Relax and repeat the exercise with your right knee.    Do 10 of these exercises for each leg.  Safe lifting method    Don t bend over at the waist to lift an object off the floor.  Instead, bend your knees and hips in a squat.     Keep your back and head upright    Hold the object close to your body, directly in front of you.    Straighten your legs to lift the object.     Lower the object to the floor in the reverse fashion.    If you must slide something across the floor, push it.    Posture tips  Sitting  Sit in chairs with straight backs or low-back support. Keep your knees lower than your hips, with your feet flat on the floor.  When driving, sit up straight. Adjust the seat forward so you are not leaning toward the steering wheel.  A small pillow or rolled towel behind your lower back may help if you are driving long distances.   Standing  When standing for long periods, shift most of your weight to one leg at a time. Switch legs every few minutes.   Sleeping  The best way to sleep is on your side with your knees bent. Put a low pillow under your head to support your neck in a neutral spine position. Don't use thick pillows that bend your neck to one side. Put a pillow between your legs to further relax your lower back. If you  sleep on your back, put pillows under your knees to support your legs in a slightly flexed position. Use a firm mattress. If your mattress sags, replace it, or use a 1/2-inch plywood board under the mattress to add support.  Follow-up care  Follow up with your healthcare provider, or as advised.  If X-rays, a CT scan or an MRI scan were taken, they may be reviewed by a radiologist. You will be told of any new findings that may affect your care.  Call 911  Call 911 if any of the following occur:    Trouble breathing    Confusion    Very drowsy    Fainting or loss of consciousness    Rapid or very slow heart rate    Loss of  bowel or bladder control  When to seek medical advice  Call your healthcare provider right away if any of the following occur:    Pain becomes worse or spreads to your arms or legs    Weakness or numbness in one or both arms or legs    Numbness in the groin area  Agnes last reviewed this educational content on 11/1/2019 2000-2021 [employer]. All rights reserved. This information is not intended as a substitute for professional medical care. Always follow your healthcare professional's instructions.

## 2021-11-15 NOTE — NURSING NOTE
"Chief Complaint   Patient presents with     Musculoskeletal Problem       Initial BP (!) 168/92   Pulse 107   Temp 98.5  F (36.9  C)   Wt 75.3 kg (166 lb)   SpO2 95%   BMI 30.36 kg/m   Estimated body mass index is 30.36 kg/m  as calculated from the following:    Height as of 9/1/20: 1.575 m (5' 2\").    Weight as of this encounter: 75.3 kg (166 lb).  Medication Reconciliation: complete  Nga Martinez LPN  "

## 2021-11-16 ENCOUNTER — TELEPHONE (OUTPATIENT)
Dept: FAMILY MEDICINE | Facility: OTHER | Age: 71
End: 2021-11-16
Payer: MEDICARE

## 2021-11-16 DIAGNOSIS — Z13.6 ENCOUNTER FOR ABDOMINAL AORTIC ANEURYSM (AAA) SCREENING: Primary | ICD-10-CM

## 2021-11-16 DIAGNOSIS — E78.5 HYPERLIPIDEMIA, UNSPECIFIED HYPERLIPIDEMIA TYPE: ICD-10-CM

## 2021-11-16 RX ORDER — ATORVASTATIN CALCIUM 40 MG/1
40 TABLET, FILM COATED ORAL DAILY
Qty: 90 TABLET | Refills: 0 | Status: SHIPPED | OUTPATIENT
Start: 2021-11-16 | End: 2022-01-31

## 2021-11-16 ASSESSMENT — ANXIETY QUESTIONNAIRES: GAD7 TOTAL SCORE: 3

## 2021-11-16 NOTE — TELEPHONE ENCOUNTER
"Diabetes Self-Management Education & Support    Presents for: Follow-up    Type of Service: Telephone Visit    How would patient like to obtain AVS? De[place]    SUBJECTIVE/OBJECTIVE:  Presents for: Follow-up  Accompanied by: Self  Diabetes education in the past 24mo: Yes  Focus of Visit: Monitoring  Diabetes type: Type 2  Date of diagnosis: new dx  Disease course: Improving  How confident are you filling out medical forms by yourself:: Not Assessed  Diabetes management related comments/concerns: Meter does not always work and says he does not always get enough blood.  Difficulty affording diabetes medication?: No  Difficulty affording diabetes testing supplies?: No  Cultural Influences/Ethnic Background:      Diabetes Symptoms & Complications:  Polydipsia: Yes  Symptom course: Improving  Weight trend: Stable  Complications assessed today?: Yes  Autonomic neuropathy: No  CVA: No  Heart disease: No  Nephropathy: No  Peripheral neuropathy: No  Peripheral Vascular Disease: No  Retinopathy: No    Patient Problem List and Family Medical History reviewed for relevant medical history, current medical status, and diabetes risk factors.    Vitals:  There were no vitals taken for this visit.  Estimated body mass index is 27.44 kg/m  as calculated from the following:    Height as of 5/6/21: 1.676 m (5' 6\").    Weight as of 5/6/21: 77.1 kg (170 lb).   Last 3 BP:   BP Readings from Last 3 Encounters:   05/06/21 126/82   04/08/21 120/82   04/03/21 110/76       History   Smoking Status     Former Smoker     Packs/day: 1.00     Years: 25.00     Types: Cigarettes   Smokeless Tobacco     Never Used       Labs:  Lab Results   Component Value Date    A1C >15.0 04/03/2021     Lab Results   Component Value Date     04/03/2021     Lab Results   Component Value Date     04/08/2021     HDL Cholesterol   Date Value Ref Range Status   04/08/2021 43 >39 mg/dL Final   ]  GFR Estimate   Date Value Ref Range Status " Please sign orders.     04/03/2021 >90 >60 mL/min/[1.73_m2] Final     Comment:     Non  GFR Calc  Starting 12/18/2018, serum creatinine based estimated GFR (eGFR) will be   calculated using the Chronic Kidney Disease Epidemiology Collaboration   (CKD-EPI) equation.       GFR Estimate If Black   Date Value Ref Range Status   04/03/2021 >90 >60 mL/min/[1.73_m2] Final     Comment:      GFR Calc  Starting 12/18/2018, serum creatinine based estimated GFR (eGFR) will be   calculated using the Chronic Kidney Disease Epidemiology Collaboration   (CKD-EPI) equation.       Lab Results   Component Value Date    CR 0.82 04/03/2021     No results found for: MICROALBUMIN    Healthy Eating:  Healthy Eating Assessed Today: Yes  Cultural/Yazdanism diet restrictions?: No  Meal planning/habits: Avoiding sweets  Meals include: Breakfast, Lunch, Dinner  Breakfast: fiber cereal and HB eggs  Lunch: almonds/peanuts  Dinner: shredded chicken and cheese  Snacks: beef jerkey, bananas, mangos  Other: stopped drinking monster drinks & Mountain Dew - 4-5 cans/soda + Monster at work; cut out sugary cereal  Beverages: Water, Coffee, Diet soda  Has patient met with a dietitian in the past?: No    Being Active:  Being Active Assessed Today: Yes  Exercise:: Yes(walks 3-5 miles per day with work and during the day)  How intense was your typical exercise? 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 : Yes  Blood Glucose Meter: Accu-chek  Times checking blood sugar at home (number): 2  Times checking blood sugar at home (per): Day  Blood glucose trend: Decreasing    112, 128, 95, 105, 119    Highest 156 - 2 hrs after ramen soup     Taking Medications:  Diabetes Medication(s)     Biguanides       metFORMIN (GLUCOPHAGE) 1000 MG tablet    Take 1 tablet (1,000 mg) by mouth 2 times daily (with meals)      Stopped glipizide due  to hypoglycemia     Taking Medication Assessed Today: Yes  Current Treatments: Oral Medication (taken by mouth)  Problems taking diabetes medications regularly?: No  Diabetes medication side effects?: No    Problem Solving:  Problem Solving Assessed Today: Yes  Is the patient at risk for hypoglycemia?: No  Medical ID: No  Does patient have glucagon emergency kit?: No  Is the patient at risk for DKA?: No  Does patient have severe weather/disaster plan for diabetes management?: No  Does patient have sick day plan for diabetes management?: No    Reducing Risks:  Reducing Risks Assessed Today: No  Diabetes Risks: Age over 45 years  CAD Risks: Diabetes Mellitus, Male sex  Has dilated eye exam at least once a year?: No  Feet checked by healthcare provider in the last year?: Yes    Healthy Coping:  Healthy Coping Assessed Today: Yes  Emotional response to diabetes: Ready to learn, Concern for health and well-being  Informal Support system:: Family, Spouse  Stage of change: MAINTENANCE (Working to maintain change, with risk of relapse)  Support resources: None  Patient Activation Measure Survey Score:  No flowsheet data found.    Diabetes knowledge and skills assessment:   Patient is knowledgeable in diabetes management concepts related to: Healthy Eating, Being Active, Monitoring, Taking Medication and Problem Solving    Patient needs further education on the following diabetes management concepts: Reducing Risks    Based on learning assessment above, most appropriate setting for further diabetes education would be: Group class or Individual setting.      INTERVENTIONS:    Education provided today on:  AADE Self-Care Behaviors:  Reducing Risks: major complications of diabetes, prevention, early diagnostic measures and treatment of complications, foot care, annual eye exam, aspirin therapy, A1C - goals, relating to blood glucose levels, how often to check, lipids levels and goals and blood pressure and goals  Healthy  Coping: recognize feelings about diagnosis and benefits of making appropriate lifestyle changes    Opportunities for ongoing education and support in diabetes-self management were discussed.    Pt verbalized understanding of concepts discussed and recommendations provided today.       Education Materials Provided:  No new materials provided today      ASSESSMENT:  Patient is feeling like things are going well. Blood sugars are quite well controlled. He has cut out his 4-5 cans/day of soda + 1 Monster. He is trying to eat more fiber and adjust diet. He will drink 1 soda about every 3 weeks still. He was having significant hypoglycemia several weeks ago. PCP stopped glipizide and this has now resolved.  Reviewed goals of patient's diabetes care. He is engaged in education and asks appropriate questions.     Patient's most recent   Lab Results   Component Value Date    A1C >15.0 04/03/2021    is not meeting goal of <7.0    PLAN  Recheck A1C next month, goal <7%  Get your eyes checked some time this summer  Keep up the hard work with diet & exercise  Follow up with diabetes ed annually, or sooner if anything changes    Felicity Dale RD, LD, [facility]   Time Spent: 18 minutes  Encounter Type: Individual    Any diabetes medication dose changes were made via the CDE Protocol and Collaborative Practice Agreement with the patient's referring provider. A copy of this encounter was shared with the provider.

## 2021-11-21 DIAGNOSIS — F41.9 ANXIETY: ICD-10-CM

## 2021-11-22 RX ORDER — CLONAZEPAM 0.5 MG/1
0.5 TABLET ORAL 2 TIMES DAILY PRN
Qty: 60 TABLET | Refills: 4 | Status: SHIPPED | OUTPATIENT
Start: 2021-11-22 | End: 2022-04-21

## 2021-12-03 ENCOUNTER — HOSPITAL ENCOUNTER (OUTPATIENT)
Dept: ULTRASOUND IMAGING | Facility: HOSPITAL | Age: 71
Discharge: HOME | End: 2021-12-03
Attending: FAMILY MEDICINE | Admitting: FAMILY MEDICINE
Payer: MEDICARE

## 2021-12-03 DIAGNOSIS — Z13.6 ENCOUNTER FOR ABDOMINAL AORTIC ANEURYSM (AAA) SCREENING: ICD-10-CM

## 2021-12-03 PROCEDURE — 76706 US ABDL AORTA SCREEN AAA: CPT

## 2021-12-10 NOTE — PROGRESS NOTES
Assessment & Plan     Hyponatremia  History of.  Last stable.      Dysthymia  Stable.  Still on the celexa and klonopin.  Mood stable.      Benign essential hypertension  Stable.  Improved.  Continue the amlodipine.      Tachycardia  Reviewed.  Sinus tach.  I didn't know how hard it was for her to walk until I saw this today and I think she was tachy due to getting in here.  110 on EKG, sinus tach.    - EKG 12-lead complete w/read - (Clinic Performed)    Chronic bilateral low back pain without sciatica  Reviewed.  She would like to try shots in the back.  MRI ordered.  Valium x 1.  I told her no klonopin that day.  Jess will drive her.    - MR Lumbar Spine w/o Contrast; Future             Tobacco Cessation:   reports that she has been smoking cigarettes. She started smoking about 55 years ago. She has a 55.00 pack-year smoking history. She has never used smokeless tobacco.          No follow-ups on file.    Pelon David MD  [facility]    Juliano García is a 71 year old who presents for the following health issues  accompanied by her daughter.    HPI     Medication Followup of amlodipine     Taking Medication as prescribed: yes    Side Effects:  None    Medication Helping Symptoms:  yes         Review of Systems   Constitutional, HEENT, cardiovascular, pulmonary, gi and gu systems are negative, except as otherwise noted.      Objective    /76   Pulse (!) 123   Temp 98.2  F (36.8  C)   Wt 73.5 kg (162 lb)   SpO2 95%   BMI 29.63 kg/m    Body mass index is 29.63 kg/m .  Physical Exam   GENERAL: healthy, alert and no distress  NECK: no adenopathy, no asymmetry, masses, or scars and thyroid normal to palpation  RESP: lungs clear to auscultation - no rales, rhonchi or wheezes  CV: regular rate and rhythm, normal S1 S2, no S3 or S4, no murmur, click or rub, no peripheral edema and peripheral pulses strong  ABDOMEN: soft, nontender, no hepatosplenomegaly, no masses and bowel  sounds normal  MS: Gait is slow and broad based.  Cane dependent.

## 2021-12-15 ENCOUNTER — OFFICE VISIT (OUTPATIENT)
Dept: FAMILY MEDICINE | Facility: OTHER | Age: 71
End: 2021-12-15
Attending: FAMILY MEDICINE
Payer: MEDICARE

## 2021-12-15 VITALS
BODY MASS INDEX: 29.63 KG/M2 | WEIGHT: 162 LBS | OXYGEN SATURATION: 95 % | TEMPERATURE: 98.2 F | HEART RATE: 123 BPM | SYSTOLIC BLOOD PRESSURE: 134 MMHG | DIASTOLIC BLOOD PRESSURE: 76 MMHG

## 2021-12-15 DIAGNOSIS — I10 BENIGN ESSENTIAL HYPERTENSION: ICD-10-CM

## 2021-12-15 DIAGNOSIS — R00.0 TACHYCARDIA: ICD-10-CM

## 2021-12-15 DIAGNOSIS — F34.1 DYSTHYMIA: ICD-10-CM

## 2021-12-15 DIAGNOSIS — E87.1 HYPONATREMIA: Primary | ICD-10-CM

## 2021-12-15 DIAGNOSIS — M54.50 CHRONIC BILATERAL LOW BACK PAIN WITHOUT SCIATICA: ICD-10-CM

## 2021-12-15 DIAGNOSIS — G89.29 CHRONIC BILATERAL LOW BACK PAIN WITHOUT SCIATICA: ICD-10-CM

## 2021-12-15 PROCEDURE — 93010 ELECTROCARDIOGRAM REPORT: CPT | Mod: 77 | Performed by: INTERNAL MEDICINE

## 2021-12-15 PROCEDURE — 93005 ELECTROCARDIOGRAM TRACING: CPT

## 2021-12-15 PROCEDURE — 99214 OFFICE O/P EST MOD 30 MIN: CPT | Mod: 25 | Performed by: FAMILY MEDICINE

## 2021-12-15 PROCEDURE — G0463 HOSPITAL OUTPT CLINIC VISIT: HCPCS | Mod: 25

## 2021-12-15 RX ORDER — DIAZEPAM 10 MG
10 TABLET ORAL EVERY 6 HOURS PRN
Qty: 1 TABLET | Refills: 0 | Status: ON HOLD | OUTPATIENT
Start: 2021-12-15 | End: 2022-06-08

## 2021-12-15 ASSESSMENT — PAIN SCALES - GENERAL: PAINLEVEL: EXTREME PAIN (8)

## 2021-12-15 NOTE — NURSING NOTE
"No chief complaint on file.      Initial /76   Pulse (!) 123   Temp 98.2  F (36.8  C)   Wt 73.5 kg (162 lb)   SpO2 95%   BMI 29.63 kg/m   Estimated body mass index is 29.63 kg/m  as calculated from the following:    Height as of 9/1/20: 1.575 m (5' 2\").    Weight as of this encounter: 73.5 kg (162 lb).  Medication Reconciliation: complete  Nga Martinez LPN    "

## 2021-12-15 NOTE — PATIENT INSTRUCTIONS
"  Patient Education     Understanding Dysthymia  You know something is wrong. You feel tired and sad most of the time. In fact, you don t seem to enjoy life much at all anymore. Nothing you do makes you feel better for very long. If this sounds like you, know that there is hope. You may have a mood disorder called dysthymia. This condition is not something you can just \"snap out of.\" Talk with your healthcare provider about treatments that can help.    What is dysthymia?  Also known as persistent depressive disorder, dysthymia is a mild form of depression that may persist for years. More women than men have dysthymia. It s not known just what causes this disorder. It s not as severe as other types of depression. But it does affect well-being. You may have trouble with work or school. Your relationships with friends and family may suffer. You may miss much of life s beauty and pleasure. If you have this disorder, you likely:    Have been depressed most days for at least 2 years    Have two or more other symptoms of depression  How is it treated?  Your healthcare provider may recommend therapy (counseling), medicines, or both. Just talking to a therapist may be a great relief. Your therapist can help you learn how best to cope with problems. And how to make positive changes in your life. Certain medicines may also be an option. These can help you feel less depressed. Eating a healthy diet, improving your sleep, and getting plenty of exercise also may help. So will having the support and caring of those closest to you.  Symptoms of depression    Gaining or losing a lot of weight    Sleeping too much or too little    Feeling tired all the time    Feeling restless    Feeling worthless or guilty    Having trouble thinking clearly or making decisions    Feeling hopeless    Moving or speaking more slowly    Thinking about death and suicide    Agnes last reviewed this educational content on 1/1/2020 2000-2021 The " [employer]. All rights reserved. This information is not intended as a substitute for professional medical care. Always follow your healthcare professional's instructions.

## 2021-12-16 ENCOUNTER — TELEPHONE (OUTPATIENT)
Dept: FAMILY MEDICINE | Facility: OTHER | Age: 71
End: 2021-12-16
Payer: MEDICARE

## 2021-12-16 NOTE — TELEPHONE ENCOUNTER
Received a PA from [employer] for diazePAM 10MG tablets. Submitted on CMM. Waiting for a response.

## 2021-12-16 NOTE — TELEPHONE ENCOUNTER
Received an APPROVAL from [employer] for Diazepam 10mg tablets. Effective 09/17/2021 to 01/15/2022. Forms scanned to [facility].

## 2021-12-29 ENCOUNTER — HOSPITAL ENCOUNTER (OUTPATIENT)
Dept: MRI IMAGING | Facility: HOSPITAL | Age: 71
Discharge: HOME | End: 2021-12-29
Attending: FAMILY MEDICINE | Admitting: FAMILY MEDICINE
Payer: MEDICARE

## 2021-12-29 DIAGNOSIS — M54.50 CHRONIC BILATERAL LOW BACK PAIN WITHOUT SCIATICA: ICD-10-CM

## 2021-12-29 DIAGNOSIS — G89.29 CHRONIC BILATERAL LOW BACK PAIN WITHOUT SCIATICA: ICD-10-CM

## 2021-12-29 PROCEDURE — G1004 CDSM NDSC: HCPCS

## 2021-12-29 PROCEDURE — 72148 MRI LUMBAR SPINE W/O DYE: CPT | Mod: ME

## 2021-12-30 DIAGNOSIS — M47.816 FACET ARTHRITIS OF LUMBAR REGION: Primary | ICD-10-CM

## 2022-01-04 ENCOUNTER — HOSPITAL ENCOUNTER (OUTPATIENT)
Dept: INTERVENTIONAL RADIOLOGY/VASCULAR | Facility: HOSPITAL | Age: 72
Discharge: HOME | End: 2022-01-04
Attending: FAMILY MEDICINE | Admitting: RADIOLOGY
Payer: MEDICARE

## 2022-01-04 DIAGNOSIS — M47.816 FACET ARTHRITIS OF LUMBAR REGION: ICD-10-CM

## 2022-01-04 PROCEDURE — G0463 HOSPITAL OUTPT CLINIC VISIT: HCPCS

## 2022-01-05 ENCOUNTER — MYC MEDICAL ADVICE (OUTPATIENT)
Dept: FAMILY MEDICINE | Facility: OTHER | Age: 72
End: 2022-01-05
Payer: MEDICARE

## 2022-01-05 NOTE — LETTER
January 6, 2022      Raquel Sanchez  [address] [facility] [address]        To Whom It May Concern,      Raquel is under my medical care.  She has an abdominal aortic aneurysm, but this will not interfere with her dental surgery.      If you have any other questions please call my office.      Sincerely,        Pelon David MD

## 2022-01-06 ENCOUNTER — TELEPHONE (OUTPATIENT)
Dept: FAMILY MEDICINE | Facility: OTHER | Age: 72
End: 2022-01-06
Payer: MEDICARE

## 2022-01-06 NOTE — TELEPHONE ENCOUNTER
10:24 AM    Reason for Call: Phone Call    Description: Daughter Jess calling for her mother stating her mother needs a letter from Dr David office that it is okay for Raquel to get her teeth pulled sent to the Oral Surgeons office. The Oral Surgeons will not pull her teeth because she had an x-ray and found she has an aortic aneurysm a small one and the surgeon office will not pull her teeth without out this letter.       Was an appointment offered for this call? No  If yes : Appointment type              Date    Preferred method for responding to this message: Telephone Call  What is your phone number ?Jess [phone]    If we cannot reach you directly, may we leave a detailed response at the number you provided? Yes    Can this message wait until your PCP/provider returns, if available today? YES, No    Ester Borrego

## 2022-01-06 NOTE — TELEPHONE ENCOUNTER
Yes.  She has other health problems, but I can say there is no problem with oral surgery from the perspective of her 4.3 cm AAA.  Thanks.  Pelon David MD

## 2022-01-24 DIAGNOSIS — F34.1 DYSTHYMIA: ICD-10-CM

## 2022-01-24 RX ORDER — CITALOPRAM HYDROBROMIDE 40 MG/1
TABLET ORAL
Qty: 90 TABLET | Refills: 2 | Status: SHIPPED | OUTPATIENT
Start: 2022-01-24 | End: 2022-05-12

## 2022-01-24 NOTE — TELEPHONE ENCOUNTER
Celexa      Last Written Prescription Date:  11.1.21  Last Fill Quantity: #90,   # refills: 0  Last Office Visit: 12.15.21  Future Office visit:       Routing refill request to provider for review/approval because:

## 2022-01-25 ENCOUNTER — HOSPITAL ENCOUNTER (OUTPATIENT)
Dept: CT IMAGING | Facility: HOSPITAL | Age: 72
End: 2022-01-25
Attending: FAMILY MEDICINE | Admitting: RADIOLOGY
Payer: MEDICARE

## 2022-01-25 ENCOUNTER — HOSPITAL ENCOUNTER (OUTPATIENT)
Facility: HOSPITAL | Age: 72
Discharge: HOME | End: 2022-01-25
Attending: RADIOLOGY | Admitting: RADIOLOGY
Payer: MEDICARE

## 2022-01-25 DIAGNOSIS — M47.816 ARTHRITIS OF FACET JOINT OF LUMBAR SPINE: ICD-10-CM

## 2022-01-25 PROCEDURE — 250N000011 HC RX IP 250 OP 636: Performed by: FAMILY MEDICINE

## 2022-01-25 PROCEDURE — 250N000009 HC RX 250: Performed by: FAMILY MEDICINE

## 2022-01-25 PROCEDURE — 64494 INJ PARAVERT F JNT L/S 2 LEV: CPT | Mod: 50

## 2022-01-25 RX ORDER — BUPIVACAINE HYDROCHLORIDE 5 MG/ML
10 INJECTION, SOLUTION EPIDURAL; INTRACAUDAL ONCE
Status: COMPLETED | OUTPATIENT
Start: 2022-01-25 | End: 2022-01-25

## 2022-01-25 RX ORDER — LIDOCAINE HYDROCHLORIDE 10 MG/ML
10 INJECTION, SOLUTION EPIDURAL; INFILTRATION; INTRACAUDAL; PERINEURAL ONCE
Status: COMPLETED | OUTPATIENT
Start: 2022-01-25 | End: 2022-01-25

## 2022-01-25 RX ORDER — TRIAMCINOLONE ACETONIDE 40 MG/ML
40-80 INJECTION, SUSPENSION INTRA-ARTICULAR; INTRAMUSCULAR ONCE
Status: COMPLETED | OUTPATIENT
Start: 2022-01-25 | End: 2022-01-25

## 2022-01-25 RX ADMIN — BUPIVACAINE HYDROCHLORIDE 2 ML: 5 INJECTION, SOLUTION EPIDURAL; INTRACAUDAL at 12:57

## 2022-01-25 RX ADMIN — LIDOCAINE HYDROCHLORIDE 10 ML: 10 INJECTION, SOLUTION EPIDURAL; INFILTRATION; INTRACAUDAL; PERINEURAL at 12:56

## 2022-01-25 RX ADMIN — TRIAMCINOLONE ACETONIDE 80 MG: 40 INJECTION, SUSPENSION INTRA-ARTICULAR; INTRAMUSCULAR at 12:56

## 2022-01-25 NOTE — DISCHARGE INSTRUCTIONS
Other phone number:  [phone] (daughter)*  Is it ok to leave a message at this number(s)? Yes    Dr Madsen completed your procedure on 1/25/2022.    Current Pain Level (0-10 Scale): 8/10  Post Pain Level (0-10):  3/10    Radiology Discharge instructions for Steroid Injection    Activity Level:     Do not do any heavy activity or exercise for 24 hours.   Do not drive for 4 hours after your injection.  Diet:   Return to your normal diet.  Medications:   If you have stopped taking your Aspirin, Coumadin/Warfarin, Ibuprofen, or any   other blood thinner for this procedure you may resume in the morning unless   your primary care provider has given you other instructions.    Diabetics may see an increase in blood sugar after steroid injections. If you are concerned about your blood sugar, please contact your family doctor.    Site Care:  Remove the bandage and bathe or shower the morning after the procedure.      This is a Pain Management procedure.  You will be contacted in two weeks for follow up.    Call your Primary Care Provider if you have the following (if your primary care provider is not available please seek emergency care):   Nausea with vomiting   Severe headache   Drowsiness or confusion   Redness or drainage at the injection or puncture site   Temperature over 101 degrees F   Other concerns   Worsening back pain   Stiff neck

## 2022-01-29 DIAGNOSIS — E78.5 HYPERLIPIDEMIA, UNSPECIFIED HYPERLIPIDEMIA TYPE: ICD-10-CM

## 2022-01-31 RX ORDER — ATORVASTATIN CALCIUM 40 MG/1
TABLET, FILM COATED ORAL
Qty: 90 TABLET | Refills: 0 | Status: SHIPPED | OUTPATIENT
Start: 2022-01-31 | End: 2022-04-28

## 2022-01-31 NOTE — TELEPHONE ENCOUNTER
Lipitor  Last Written Prescription Date: 11/16/21  Last Fill Quantity: 90 # of Refills: 0  Last Office Visit: 12/15/21

## 2022-02-17 PROBLEM — F11.90 CHRONIC, CONTINUOUS USE OF OPIOIDS: Status: ACTIVE | Noted: 2018-01-10

## 2022-02-18 ENCOUNTER — TELEPHONE (OUTPATIENT)
Dept: INTERVENTIONAL RADIOLOGY/VASCULAR | Facility: HOSPITAL | Age: 72
End: 2022-02-18
Payer: MEDICARE

## 2022-02-18 NOTE — TELEPHONE ENCOUNTER
CONSULT PATIENT  PAIN INJECTION POST CALL    Procedure: Bilateral L3-4 L4-5 facet injection  Radiologist(s): Dr. Dax Madsen  Date of Procedure: 1/25/22    The patient was not available by telephone. Left message     Erin Mcginnis

## 2022-02-25 ENCOUNTER — TELEPHONE (OUTPATIENT)
Dept: INTERVENTIONAL RADIOLOGY/VASCULAR | Facility: HOSPITAL | Age: 72
End: 2022-02-25
Payer: MEDICARE

## 2022-02-25 NOTE — TELEPHONE ENCOUNTER
CONSULT PATIENT  PAIN INJECTION POST CALL    Procedure:    Bilateral L3-4 L4-5 facet injection  Radiologist(s):        Dr. Dax Madsen  Date of Procedure: 1/25/22    I responded to the patient's questions/concerns.    Relief of pain from this injection    A = 90%  A- = 85%  B = 80%  B- =75%  C = 70%  C- = 65%  D = 60%  D- = 50%  F = less than 50%       Would you say this injection has been beneficial? no  If yes, for how long? Had 20% for two days     Was there one injection that worked better than the other? This is patients first injection    Where is the pain?  Pain is located all across the low back  Can you describe the pain? Aching and throbbing  Does the pain radiate anywhere? no  If yes, where does it radiate and where does the pain stop?n/a    Is this new pain? No    Call with recommendation      Erin Mcginnis

## 2022-03-19 ENCOUNTER — HEALTH MAINTENANCE LETTER (OUTPATIENT)
Age: 72
End: 2022-03-19

## 2022-03-28 ENCOUNTER — NURSE TRIAGE (OUTPATIENT)
Dept: NURSING | Facility: CLINIC | Age: 72
End: 2022-03-28

## 2022-03-28 DIAGNOSIS — R52 PAIN: Primary | ICD-10-CM

## 2022-03-28 NOTE — TELEPHONE ENCOUNTER
"Nurse Triage SBAR    Is this a 2nd Level Triage? YES, LICENSED PRACTITIONER REVIEW IS REQUIRED    Situation: Patient calling with severe pain in her back and legs. Requesting pain medication.     Background: She reports severe pain in her back and legs that started 1-2 months ago \"after her injections\"     Assessment: She reports she is having difficulty walking to the bathroom from her chair. She is using a walker. She states her legs feel weak.     Protocol Recommended Disposition:   Go To ED/UCC Now (Or To Office With PCP Approval)    Recommendation:     Routed to provider    Does the patient meet one of the following criteria for ADS visit consideration? 16+ years old, with an [facility] PCP     TIP  Providers, please consider if this condition is appropriate for management at one of our Acute and Diagnostic Services sites.     If patient is a good candidate, please use dotphrase <dot>triageresponse and select Refer to ADS to document.Triage call:       Marcia Reinoso RN   03/28/22 3:43 PM  [facility] Nurse Advisor      Reason for Disposition    Weakness of a leg or foot (e.g., unable to bear weight, dragging foot)    Additional Information    Negative: Passed out (i.e., fainted, collapsed and was not responding)    Negative: Shock suspected (e.g., cold/pale/clammy skin, too weak to stand, low BP, rapid pulse)    Negative: Sounds like a life-threatening emergency to the triager    Negative: Major injury to the back (e.g., MVA, fall > 10 feet or 3 meters, penetrating injury, etc.)    Negative: Pain in the upper back over the ribs (rib cage) that radiates (travels) into the chest    Negative: Pain in the upper back over the ribs (rib cage) and worsened by coughing (or clearly increases with breathing)    Negative: SEVERE back pain of sudden onset and age > 60    Negative: SEVERE abdominal pain (e.g., excruciating)    Negative: Abdominal pain and age > 60    Negative: Unable to urinate (or only a few drops) and " bladder feels very full    Negative: Loss of bladder or bowel control (urine or bowel incontinence; wetting self, leaking stool) of new onset    Negative: Numbness (loss of sensation) in groin or rectal area    Negative: Blood in urine (red, pink, or tea-colored)    Negative: Vomiting and pain over lower ribs of back (i.e., flank - kidney area)    Negative: Pain radiates into groin, scrotum    Protocols used: BACK PAIN-A-OH

## 2022-03-29 RX ORDER — PREDNISONE 20 MG/1
20 TABLET ORAL DAILY
Qty: 10 TABLET | Refills: 0 | Status: SHIPPED | OUTPATIENT
Start: 2022-03-29 | End: 2022-04-08

## 2022-04-12 ENCOUNTER — HOSPITAL ENCOUNTER (OUTPATIENT)
Facility: HOSPITAL | Age: 72
Discharge: HOME | End: 2022-04-12
Attending: RADIOLOGY | Admitting: RADIOLOGY
Payer: MEDICARE

## 2022-04-12 ENCOUNTER — HOSPITAL ENCOUNTER (OUTPATIENT)
Dept: INTERVENTIONAL RADIOLOGY/VASCULAR | Facility: HOSPITAL | Age: 72
Discharge: HOME | End: 2022-04-12
Attending: FAMILY MEDICINE | Admitting: RADIOLOGY
Payer: MEDICARE

## 2022-04-12 DIAGNOSIS — M51.369 BULGING OF LUMBAR INTERVERTEBRAL DISC: ICD-10-CM

## 2022-04-12 DIAGNOSIS — M99.23 SUBLUXATION STENOSIS OF NEURAL CANAL OF LUMBAR REGION: ICD-10-CM

## 2022-04-12 DIAGNOSIS — M47.816 FACET DEGENERATION OF LUMBAR REGION: ICD-10-CM

## 2022-04-12 DIAGNOSIS — S33.130A: ICD-10-CM

## 2022-04-12 DIAGNOSIS — M51.369 DDD (DEGENERATIVE DISC DISEASE), LUMBAR: ICD-10-CM

## 2022-04-12 DIAGNOSIS — M47.816 LUMBAR SPONDYLOSIS: ICD-10-CM

## 2022-04-12 PROCEDURE — 250N000011 HC RX IP 250 OP 636: Performed by: RADIOLOGY

## 2022-04-12 PROCEDURE — 62323 NJX INTERLAMINAR LMBR/SAC: CPT

## 2022-04-12 RX ORDER — IOPAMIDOL 612 MG/ML
15 INJECTION, SOLUTION INTRATHECAL ONCE
Status: COMPLETED | OUTPATIENT
Start: 2022-04-12 | End: 2022-04-12

## 2022-04-12 RX ORDER — DEXAMETHASONE SODIUM PHOSPHATE 10 MG/ML
10 INJECTION, SOLUTION INTRAMUSCULAR; INTRAVENOUS ONCE
Status: COMPLETED | OUTPATIENT
Start: 2022-04-12 | End: 2022-04-12

## 2022-04-12 RX ADMIN — IOPAMIDOL 6 ML: 612 INJECTION, SOLUTION INTRATHECAL at 11:09

## 2022-04-12 RX ADMIN — DEXAMETHASONE SODIUM PHOSPHATE 10 MG: 10 INJECTION, SOLUTION INTRAMUSCULAR; INTRAVENOUS at 11:09

## 2022-04-12 NOTE — DISCHARGE INSTRUCTIONS
Cell number on file:    Telephone Information:   Mobile [phone]     Is it ok to leave a message at this number(s)? Yes    Dr. Najera completed your procedure on 4/12/2022.    Current Pain Level (0-10 Scale): 8/10  Post Pain Level (0-10):  0/10    Radiology Discharge instructions for Steroid Injection    Activity Level:     Do not do any heavy activity or exercise for 24 hours.   Do not drive for 4 hours after your injection.  Diet:   Return to your normal diet.  Medications:   If you have stopped taking your Aspirin, Coumadin/Warfarin, Ibuprofen, or any   other blood thinner for this procedure you may resume in the morning unless   your primary care provider has given you other instructions.    Diabetics may see an increase in blood sugar after steroid injections. If you are concerned about your blood sugar, please contact your family doctor.    Site Care:  Remove the bandage and bathe or shower the morning after the procedure.      This is a Pain Management procedure.  You will be contacted in two weeks for follow up.    Call your Primary Care Provider if you have the following (if your primary care provider is not available please seek emergency care):   Nausea with vomiting   Severe headache   Drowsiness or confusion   Redness or drainage at the injection or puncture site   Temperature over 101 degrees F   Other concerns   Worsening back pain   Stiff neck

## 2022-04-21 ENCOUNTER — TRANSFERRED RECORDS (OUTPATIENT)
Dept: HEALTH INFORMATION MANAGEMENT | Facility: CLINIC | Age: 72
End: 2022-04-21

## 2022-04-26 ENCOUNTER — TELEPHONE (OUTPATIENT)
Dept: INTERVENTIONAL RADIOLOGY/VASCULAR | Facility: HOSPITAL | Age: 72
End: 2022-04-26
Payer: MEDICARE

## 2022-04-26 NOTE — TELEPHONE ENCOUNTER
CONSULT PATIENT  PAIN INJECTION POST CALL    Procedure: Epidural TL L4-5  Radiologist(s): Dr. Fran Najera  Date of Procedure: 4/12/22    I responded to the patient's questions/concerns.    Relief of pain from this injection    A = 90%  A- = 85%  B = 80%  B- =75%  C = 70%  C- = 65%  D = 60%  D- = 50%  F = less than 50%       Would you say this injection has been beneficial? Short term  If yes, for how long? 50% relief for two days    Was there one injection that worked better than the other? no    Where is the pain? Low back below waist line on the spine  Can you describe the pain? Sharp and achey  Does the pain radiate anywhere? no  If yes, where does it radiate and where does the pain stop?n/a    Is this new pain? No    Patient would like to pursue another injection. Call with recommendation      Erin Mcginnis

## 2022-04-28 DIAGNOSIS — E78.5 HYPERLIPIDEMIA, UNSPECIFIED HYPERLIPIDEMIA TYPE: ICD-10-CM

## 2022-04-28 RX ORDER — ATORVASTATIN CALCIUM 40 MG/1
TABLET, FILM COATED ORAL
Qty: 90 TABLET | Refills: 1 | Status: SHIPPED | OUTPATIENT
Start: 2022-04-28 | End: 2022-10-24

## 2022-05-04 ENCOUNTER — TELEPHONE (OUTPATIENT)
Dept: FAMILY MEDICINE | Facility: OTHER | Age: 72
End: 2022-05-04
Payer: MEDICARE

## 2022-05-04 ENCOUNTER — HOSPITAL ENCOUNTER (OUTPATIENT)
Facility: HOSPITAL | Age: 72
End: 2022-05-04
Attending: RADIOLOGY | Admitting: RADIOLOGY
Payer: MEDICARE
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
## 2022-05-06 ENCOUNTER — TRANSFERRED RECORDS (OUTPATIENT)
Dept: HEALTH INFORMATION MANAGEMENT | Facility: CLINIC | Age: 72
End: 2022-05-06

## 2022-05-06 LAB
EJECTION FRACTION: 65 %
EJECTION FRACTION: 65 %
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
## 2022-05-12 ENCOUNTER — TELEPHONE (OUTPATIENT)
Dept: FAMILY MEDICINE | Facility: OTHER | Age: 72
End: 2022-05-12

## 2022-05-12 ENCOUNTER — OFFICE VISIT (OUTPATIENT)
Dept: FAMILY MEDICINE | Facility: OTHER | Age: 72
End: 2022-05-12
Attending: FAMILY MEDICINE
Payer: MEDICARE

## 2022-05-12 VITALS
SYSTOLIC BLOOD PRESSURE: 102 MMHG | HEART RATE: 121 BPM | TEMPERATURE: 97.2 F | DIASTOLIC BLOOD PRESSURE: 58 MMHG | BODY MASS INDEX: 26.34 KG/M2 | WEIGHT: 144 LBS | OXYGEN SATURATION: 95 %

## 2022-05-12 DIAGNOSIS — R32 URINARY INCONTINENCE, UNSPECIFIED TYPE: ICD-10-CM

## 2022-05-12 DIAGNOSIS — I47.20 V-TACH (H): Primary | ICD-10-CM

## 2022-05-12 DIAGNOSIS — E78.5 HYPERLIPIDEMIA, UNSPECIFIED HYPERLIPIDEMIA TYPE: ICD-10-CM

## 2022-05-12 DIAGNOSIS — E87.6 HYPOKALEMIA: ICD-10-CM

## 2022-05-12 DIAGNOSIS — R29.898 LEG WEAKNESS, BILATERAL: ICD-10-CM

## 2022-05-12 DIAGNOSIS — E87.6 HYPOKALEMIA: Primary | ICD-10-CM

## 2022-05-12 PROBLEM — R94.31 PROLONGED Q-T INTERVAL ON ECG: Status: ACTIVE | Noted: 2022-04-21

## 2022-05-12 PROBLEM — Z78.9 ALCOHOL USE: Status: ACTIVE | Noted: 2022-04-21

## 2022-05-12 PROBLEM — I10 HYPERTENSION: Status: ACTIVE | Noted: 2022-04-21

## 2022-05-12 LAB
ANION GAP SERPL CALCULATED.3IONS-SCNC: 12 MMOL/L (ref 3–14)
BUN SERPL-MCNC: 3 MG/DL (ref 7–30)
CALCIUM SERPL-MCNC: 8.2 MG/DL (ref 8.5–10.1)
CHLORIDE BLD-SCNC: 100 MMOL/L (ref 94–109)
CHOLEST SERPL-MCNC: 122 MG/DL
CO2 SERPL-SCNC: 25 MMOL/L (ref 20–32)
CREAT SERPL-MCNC: 0.48 MG/DL (ref 0.52–1.04)
FASTING STATUS PATIENT QL REPORTED: YES
GFR SERPL CREATININE-BSD FRML MDRD: >90 ML/MIN/1.73M2
GLUCOSE BLD-MCNC: 140 MG/DL (ref 70–99)
HDLC SERPL-MCNC: 69 MG/DL
LDLC SERPL CALC-MCNC: 25 MG/DL
NONHDLC SERPL-MCNC: 53 MG/DL
POTASSIUM BLD-SCNC: 2.7 MMOL/L (ref 3.4–5.3)
SODIUM SERPL-SCNC: 137 MMOL/L (ref 133–144)
TRIGL SERPL-MCNC: 139 MG/DL

## 2022-05-12 PROCEDURE — 99214 OFFICE O/P EST MOD 30 MIN: CPT | Performed by: FAMILY MEDICINE

## 2022-05-12 PROCEDURE — 80061 LIPID PANEL: CPT | Mod: ZL | Performed by: FAMILY MEDICINE

## 2022-05-12 PROCEDURE — 80048 BASIC METABOLIC PNL TOTAL CA: CPT | Mod: ZL | Performed by: FAMILY MEDICINE

## 2022-05-12 PROCEDURE — 36415 COLL VENOUS BLD VENIPUNCTURE: CPT | Mod: ZL | Performed by: FAMILY MEDICINE

## 2022-05-12 PROCEDURE — G0463 HOSPITAL OUTPT CLINIC VISIT: HCPCS

## 2022-05-12 RX ORDER — TRAMADOL HYDROCHLORIDE 50 MG/1
50 TABLET ORAL PRN
Status: ON HOLD | COMMUNITY
Start: 2022-01-17 | End: 2022-06-08

## 2022-05-12 RX ORDER — HYDROXYZINE HYDROCHLORIDE 25 MG/1
25 TABLET, FILM COATED ORAL 3 TIMES DAILY PRN
Status: ON HOLD | COMMUNITY
Start: 2022-04-24 | End: 2022-06-08

## 2022-05-12 RX ORDER — POTASSIUM CHLORIDE 1500 MG/1
20 TABLET, EXTENDED RELEASE ORAL 2 TIMES DAILY
Qty: 180 TABLET | Refills: 3 | Status: SHIPPED | OUTPATIENT
Start: 2022-05-12 | End: 2023-04-20

## 2022-05-12 ASSESSMENT — PAIN SCALES - GENERAL: PAINLEVEL: NO PAIN (0)

## 2022-05-12 NOTE — TELEPHONE ENCOUNTER
DATE:  5/12/2022   TIME OF RECEIPT FROM LAB:  1433  LAB TEST:  potassium  LAB VALUE:  2.7  RESULTS GIVEN WITH READ-BACK TO (PROVIDER):  TRENT MATT  TIME LAB VALUE REPORTED TO PROVIDER:   1438  Nga Martinez LPN

## 2022-05-12 NOTE — NURSING NOTE
"Chief Complaint   Patient presents with     ER F/U       Initial /58   Pulse (!) 121   Temp 97.2  F (36.2  C)   Wt 65.3 kg (144 lb)   SpO2 95%   BMI 26.34 kg/m   Estimated body mass index is 26.34 kg/m  as calculated from the following:    Height as of 9/1/20: 1.575 m (5' 2\").    Weight as of this encounter: 65.3 kg (144 lb).  Medication Reconciliation: complete  Nga Martinez LPN  "

## 2022-05-14 ENCOUNTER — HEALTH MAINTENANCE LETTER (OUTPATIENT)
Age: 72
End: 2022-05-14

## 2022-06-07 ENCOUNTER — OFFICE VISIT (OUTPATIENT)
Dept: FAMILY MEDICINE | Facility: OTHER | Age: 72
End: 2022-06-07
Attending: NURSE PRACTITIONER
Payer: MEDICARE

## 2022-06-07 ENCOUNTER — ANESTHESIA (OUTPATIENT)
Dept: EMERGENCY MEDICINE | Facility: HOSPITAL | Age: 72
DRG: 177 | End: 2022-06-07
Payer: MEDICARE

## 2022-06-07 ENCOUNTER — APPOINTMENT (OUTPATIENT)
Dept: CT IMAGING | Facility: HOSPITAL | Age: 72
DRG: 177 | End: 2022-06-07
Attending: PHYSICIAN ASSISTANT
Payer: MEDICARE

## 2022-06-07 ENCOUNTER — HOSPITAL ENCOUNTER (INPATIENT)
Facility: HOSPITAL | Age: 72
LOS: 14 days | Discharge: NURSING, REHAB OR LONG-TERM CARE | DRG: 177 | End: 2022-06-21
Attending: PHYSICIAN ASSISTANT | Admitting: INTERNAL MEDICINE
Payer: MEDICARE

## 2022-06-07 ENCOUNTER — ANESTHESIA EVENT (OUTPATIENT)
Dept: EMERGENCY MEDICINE | Facility: HOSPITAL | Age: 72
DRG: 177 | End: 2022-06-07
Payer: MEDICARE

## 2022-06-07 DIAGNOSIS — R62.7 FAILURE TO THRIVE IN ADULT: ICD-10-CM

## 2022-06-07 DIAGNOSIS — R19.7 DIARRHEA, UNSPECIFIED TYPE: ICD-10-CM

## 2022-06-07 DIAGNOSIS — F34.1 DYSTHYMIA: ICD-10-CM

## 2022-06-07 DIAGNOSIS — Z71.9 CONSULTATION WITHOUT SPECIFIC COMPLAINT: Primary | ICD-10-CM

## 2022-06-07 DIAGNOSIS — Z72.0 TOBACCO ABUSE: ICD-10-CM

## 2022-06-07 DIAGNOSIS — K20.90 ESOPHAGITIS: ICD-10-CM

## 2022-06-07 DIAGNOSIS — J90 PLEURAL EFFUSION: ICD-10-CM

## 2022-06-07 DIAGNOSIS — Z87.891 HISTORY OF NICOTINE DEPENDENCE: ICD-10-CM

## 2022-06-07 DIAGNOSIS — R21: ICD-10-CM

## 2022-06-07 DIAGNOSIS — M62.81 GENERALIZED MUSCLE WEAKNESS: ICD-10-CM

## 2022-06-07 DIAGNOSIS — R94.31 PROLONGED Q-T INTERVAL ON ECG: ICD-10-CM

## 2022-06-07 DIAGNOSIS — R26.89 INABILITY TO BEAR WEIGHT: ICD-10-CM

## 2022-06-07 DIAGNOSIS — L89.309 DECUBITUS ULCER OF BUTTOCK: ICD-10-CM

## 2022-06-07 DIAGNOSIS — E44.0 MODERATE PROTEIN-CALORIE MALNUTRITION (H): Primary | ICD-10-CM

## 2022-06-07 DIAGNOSIS — I10 BENIGN ESSENTIAL HYPERTENSION: ICD-10-CM

## 2022-06-07 DIAGNOSIS — U07.1 COVID-19: ICD-10-CM

## 2022-06-07 PROBLEM — E46 PROTEIN-CALORIE MALNUTRITION (H): Status: ACTIVE | Noted: 2022-06-07

## 2022-06-07 PROBLEM — R53.1 WEAKNESS: Status: ACTIVE | Noted: 2022-06-07

## 2022-06-07 PROBLEM — N30.00 ACUTE CYSTITIS WITHOUT HEMATURIA: Status: ACTIVE | Noted: 2022-06-07

## 2022-06-07 LAB
ALBUMIN SERPL-MCNC: 1.6 G/DL (ref 3.4–5)
ALBUMIN UR-MCNC: 30 MG/DL
ALP SERPL-CCNC: 351 U/L (ref 40–150)
ALT SERPL W P-5'-P-CCNC: 18 U/L (ref 0–50)
AMPHETAMINES UR QL: NOT DETECTED
ANION GAP SERPL CALCULATED.3IONS-SCNC: 10 MMOL/L (ref 3–14)
APPEARANCE UR: ABNORMAL
AST SERPL W P-5'-P-CCNC: 38 U/L (ref 0–45)
BACTERIA #/AREA URNS HPF: ABNORMAL /HPF
BARBITURATES UR QL SCN: NOT DETECTED
BASE EXCESS BLDV CALC-SCNC: -0.4 MMOL/L (ref -7.7–1.9)
BASOPHILS # BLD AUTO: 0 10E3/UL (ref 0–0.2)
BASOPHILS NFR BLD AUTO: 0 %
BENZODIAZ UR QL SCN: NOT DETECTED
BILIRUB SERPL-MCNC: 0.6 MG/DL (ref 0.2–1.3)
BILIRUB UR QL STRIP: NEGATIVE
BUN SERPL-MCNC: 3 MG/DL (ref 7–30)
BUPRENORPHINE UR QL: NOT DETECTED
CALCIUM SERPL-MCNC: 7.9 MG/DL (ref 8.5–10.1)
CANNABINOIDS UR QL: NOT DETECTED
CHLORIDE BLD-SCNC: 104 MMOL/L (ref 94–109)
CK SERPL-CCNC: 37 U/L (ref 30–225)
CO2 SERPL-SCNC: 21 MMOL/L (ref 20–32)
COCAINE UR QL SCN: NOT DETECTED
COLOR UR AUTO: YELLOW
CREAT SERPL-MCNC: 0.37 MG/DL (ref 0.52–1.04)
CREAT SERPL-MCNC: 0.43 MG/DL (ref 0.52–1.04)
CRP SERPL-MCNC: 16.4 MG/L (ref 0–8)
D DIMER PPP FEU-MCNC: 1.84 UG/ML FEU (ref 0–0.5)
D-METHAMPHET UR QL: NOT DETECTED
EOSINOPHIL # BLD AUTO: 0 10E3/UL (ref 0–0.7)
EOSINOPHIL NFR BLD AUTO: 0 %
ERYTHROCYTE [DISTWIDTH] IN BLOOD BY AUTOMATED COUNT: 14.3 % (ref 10–15)
GFR SERPL CREATININE-BSD FRML MDRD: >90 ML/MIN/1.73M2
GFR SERPL CREATININE-BSD FRML MDRD: >90 ML/MIN/1.73M2
GLUCOSE BLD-MCNC: 116 MG/DL (ref 70–99)
GLUCOSE UR STRIP-MCNC: NEGATIVE MG/DL
HCO3 BLDV-SCNC: 23 MMOL/L (ref 21–28)
HCT VFR BLD AUTO: 37.3 % (ref 35–47)
HGB BLD-MCNC: 12.7 G/DL (ref 11.7–15.7)
HGB UR QL STRIP: NEGATIVE
HYALINE CASTS: 39 /LPF
IMM GRANULOCYTES # BLD: 0.1 10E3/UL
IMM GRANULOCYTES NFR BLD: 0 %
INR PPP: 0.97 (ref 0.85–1.15)
KETONES UR STRIP-MCNC: 10 MG/DL
LACTATE SERPL-SCNC: 1.5 MMOL/L (ref 0.7–2)
LEUKOCYTE ESTERASE UR QL STRIP: ABNORMAL
LIPASE SERPL-CCNC: 85 U/L (ref 73–393)
LYMPHOCYTES # BLD AUTO: 1.7 10E3/UL (ref 0.8–5.3)
LYMPHOCYTES NFR BLD AUTO: 15 %
MAGNESIUM SERPL-MCNC: 1.7 MG/DL (ref 1.6–2.3)
MAGNESIUM SERPL-MCNC: 1.7 MG/DL (ref 1.6–2.3)
MCH RBC QN AUTO: 31.9 PG (ref 26.5–33)
MCHC RBC AUTO-ENTMCNC: 34 G/DL (ref 31.5–36.5)
MCV RBC AUTO: 94 FL (ref 78–100)
METHADONE UR QL SCN: NOT DETECTED
MONOCYTES # BLD AUTO: 1.3 10E3/UL (ref 0–1.3)
MONOCYTES NFR BLD AUTO: 12 %
MUCOUS THREADS #/AREA URNS LPF: PRESENT /LPF
NEUTROPHILS # BLD AUTO: 8.2 10E3/UL (ref 1.6–8.3)
NEUTROPHILS NFR BLD AUTO: 73 %
NITRATE UR QL: NEGATIVE
NRBC # BLD AUTO: 0 10E3/UL
NRBC BLD AUTO-RTO: 0 /100
NT-PROBNP SERPL-MCNC: 125 PG/ML (ref 0–900)
O2/TOTAL GAS SETTING VFR VENT: 21 %
OPIATES UR QL SCN: DETECTED
OXYCODONE UR QL SCN: NOT DETECTED
OXYHGB MFR BLDV: 60 % (ref 70–75)
PCO2 BLDV: 35 MM HG (ref 40–50)
PCP UR QL SCN: NOT DETECTED
PH BLDV: 7.44 [PH] (ref 7.32–7.43)
PH UR STRIP: 5.5 [PH] (ref 4.7–8)
PLATELET # BLD AUTO: 630 10E3/UL (ref 150–450)
PO2 BLDV: 33 MM HG (ref 25–47)
POTASSIUM BLD-SCNC: 4.1 MMOL/L (ref 3.4–5.3)
POTASSIUM BLD-SCNC: 4.1 MMOL/L (ref 3.4–5.3)
PROCALCITONIN SERPL-MCNC: 0.26 NG/ML
PROPOXYPH UR QL: NOT DETECTED
PROT SERPL-MCNC: 6.5 G/DL (ref 6.8–8.8)
RBC # BLD AUTO: 3.98 10E6/UL (ref 3.8–5.2)
RBC URINE: 32 /HPF
SARS-COV-2 RNA RESP QL NAA+PROBE: POSITIVE
SODIUM SERPL-SCNC: 135 MMOL/L (ref 133–144)
SP GR UR STRIP: 1.01 (ref 1–1.03)
SQUAMOUS EPITHELIAL: 1 /HPF
TRICYCLICS UR QL SCN: NOT DETECTED
TROPONIN I SERPL HS-MCNC: 7 NG/L
TSH SERPL DL<=0.005 MIU/L-ACNC: 0.97 MU/L (ref 0.4–4)
UROBILINOGEN UR STRIP-MCNC: 2 MG/DL
WBC # BLD AUTO: 11.3 10E3/UL (ref 4–11)
WBC URINE: 24 /HPF

## 2022-06-07 PROCEDURE — 99343 PR HOME VISIT,NEW PATIENT,LEVEL III: CPT | Performed by: NURSE PRACTITIONER

## 2022-06-07 PROCEDURE — 84484 ASSAY OF TROPONIN QUANT: CPT | Performed by: PHYSICIAN ASSISTANT

## 2022-06-07 PROCEDURE — U0005 INFEC AGEN DETEC AMPLI PROBE: HCPCS | Performed by: PHYSICIAN ASSISTANT

## 2022-06-07 PROCEDURE — 83690 ASSAY OF LIPASE: CPT | Performed by: PHYSICIAN ASSISTANT

## 2022-06-07 PROCEDURE — 85025 COMPLETE CBC W/AUTO DIFF WBC: CPT | Performed by: PHYSICIAN ASSISTANT

## 2022-06-07 PROCEDURE — 250N000011 HC RX IP 250 OP 636: Performed by: PHYSICIAN ASSISTANT

## 2022-06-07 PROCEDURE — 87086 URINE CULTURE/COLONY COUNT: CPT | Performed by: PHYSICIAN ASSISTANT

## 2022-06-07 PROCEDURE — 96374 THER/PROPH/DIAG INJ IV PUSH: CPT

## 2022-06-07 PROCEDURE — 120N000001 HC R&B MED SURG/OB

## 2022-06-07 PROCEDURE — 36415 COLL VENOUS BLD VENIPUNCTURE: CPT | Performed by: PHYSICIAN ASSISTANT

## 2022-06-07 PROCEDURE — 96376 TX/PRO/DX INJ SAME DRUG ADON: CPT

## 2022-06-07 PROCEDURE — C9803 HOPD COVID-19 SPEC COLLECT: HCPCS

## 2022-06-07 PROCEDURE — 93005 ELECTROCARDIOGRAM TRACING: CPT

## 2022-06-07 PROCEDURE — 84443 ASSAY THYROID STIM HORMONE: CPT | Performed by: INTERNAL MEDICINE

## 2022-06-07 PROCEDURE — 84132 ASSAY OF SERUM POTASSIUM: CPT | Performed by: INTERNAL MEDICINE

## 2022-06-07 PROCEDURE — 80306 DRUG TEST PRSMV INSTRMNT: CPT | Performed by: INTERNAL MEDICINE

## 2022-06-07 PROCEDURE — 83605 ASSAY OF LACTIC ACID: CPT | Performed by: PHYSICIAN ASSISTANT

## 2022-06-07 PROCEDURE — 86140 C-REACTIVE PROTEIN: CPT | Performed by: PHYSICIAN ASSISTANT

## 2022-06-07 PROCEDURE — 83735 ASSAY OF MAGNESIUM: CPT | Performed by: INTERNAL MEDICINE

## 2022-06-07 PROCEDURE — 83735 ASSAY OF MAGNESIUM: CPT | Performed by: PHYSICIAN ASSISTANT

## 2022-06-07 PROCEDURE — 80053 COMPREHEN METABOLIC PANEL: CPT | Performed by: PHYSICIAN ASSISTANT

## 2022-06-07 PROCEDURE — 85610 PROTHROMBIN TIME: CPT | Performed by: PHYSICIAN ASSISTANT

## 2022-06-07 PROCEDURE — 93010 ELECTROCARDIOGRAM REPORT: CPT | Performed by: INTERNAL MEDICINE

## 2022-06-07 PROCEDURE — 82805 BLOOD GASES W/O2 SATURATION: CPT | Performed by: PHYSICIAN ASSISTANT

## 2022-06-07 PROCEDURE — 99285 EMERGENCY DEPT VISIT HI MDM: CPT | Performed by: PHYSICIAN ASSISTANT

## 2022-06-07 PROCEDURE — 370N000003 HC ANESTHESIA WARD SERVICE: Performed by: NURSE ANESTHETIST, CERTIFIED REGISTERED

## 2022-06-07 PROCEDURE — 83880 ASSAY OF NATRIURETIC PEPTIDE: CPT | Performed by: PHYSICIAN ASSISTANT

## 2022-06-07 PROCEDURE — 36415 COLL VENOUS BLD VENIPUNCTURE: CPT | Performed by: INTERNAL MEDICINE

## 2022-06-07 PROCEDURE — 87186 SC STD MICRODIL/AGAR DIL: CPT | Performed by: PHYSICIAN ASSISTANT

## 2022-06-07 PROCEDURE — 82550 ASSAY OF CK (CPK): CPT | Performed by: PHYSICIAN ASSISTANT

## 2022-06-07 PROCEDURE — 71275 CT ANGIOGRAPHY CHEST: CPT

## 2022-06-07 PROCEDURE — 82565 ASSAY OF CREATININE: CPT | Performed by: INTERNAL MEDICINE

## 2022-06-07 PROCEDURE — 85379 FIBRIN DEGRADATION QUANT: CPT | Performed by: PHYSICIAN ASSISTANT

## 2022-06-07 PROCEDURE — 258N000003 HC RX IP 258 OP 636: Performed by: INTERNAL MEDICINE

## 2022-06-07 PROCEDURE — 81003 URINALYSIS AUTO W/O SCOPE: CPT | Performed by: PHYSICIAN ASSISTANT

## 2022-06-07 PROCEDURE — 84145 PROCALCITONIN (PCT): CPT | Performed by: PHYSICIAN ASSISTANT

## 2022-06-07 PROCEDURE — 99285 EMERGENCY DEPT VISIT HI MDM: CPT | Mod: 25

## 2022-06-07 PROCEDURE — 258N000003 HC RX IP 258 OP 636: Performed by: PHYSICIAN ASSISTANT

## 2022-06-07 RX ORDER — ACETAMINOPHEN 10 MG/ML
1000 INJECTION, SOLUTION INTRAVENOUS EVERY 6 HOURS PRN
Status: DISCONTINUED | OUTPATIENT
Start: 2022-06-07 | End: 2022-06-08 | Stop reason: ALTCHOICE

## 2022-06-07 RX ORDER — ACETAMINOPHEN 325 MG/1
325 TABLET ORAL EVERY 6 HOURS PRN
Status: DISCONTINUED | OUTPATIENT
Start: 2022-06-07 | End: 2022-06-21 | Stop reason: HOSPADM

## 2022-06-07 RX ORDER — IOPAMIDOL 755 MG/ML
54 INJECTION, SOLUTION INTRAVASCULAR ONCE
Status: COMPLETED | OUTPATIENT
Start: 2022-06-07 | End: 2022-06-07

## 2022-06-07 RX ORDER — HYDROMORPHONE HYDROCHLORIDE 1 MG/ML
0.5 INJECTION, SOLUTION INTRAMUSCULAR; INTRAVENOUS; SUBCUTANEOUS ONCE
Status: COMPLETED | OUTPATIENT
Start: 2022-06-07 | End: 2022-06-07

## 2022-06-07 RX ORDER — ATORVASTATIN CALCIUM 40 MG/1
40 TABLET, FILM COATED ORAL EVERY EVENING
Status: DISCONTINUED | OUTPATIENT
Start: 2022-06-07 | End: 2022-06-21 | Stop reason: HOSPADM

## 2022-06-07 RX ORDER — LIDOCAINE 40 MG/G
CREAM TOPICAL
Status: DISCONTINUED | OUTPATIENT
Start: 2022-06-07 | End: 2022-06-14

## 2022-06-07 RX ORDER — SODIUM CHLORIDE 9 MG/ML
INJECTION, SOLUTION INTRAVENOUS CONTINUOUS
Status: DISCONTINUED | OUTPATIENT
Start: 2022-06-07 | End: 2022-06-10

## 2022-06-07 RX ORDER — PROCHLORPERAZINE MALEATE 5 MG
5 TABLET ORAL EVERY 6 HOURS PRN
Status: DISCONTINUED | OUTPATIENT
Start: 2022-06-07 | End: 2022-06-21 | Stop reason: HOSPADM

## 2022-06-07 RX ORDER — AMLODIPINE BESYLATE 10 MG/1
10 TABLET ORAL DAILY
Status: DISCONTINUED | OUTPATIENT
Start: 2022-06-08 | End: 2022-06-21 | Stop reason: HOSPADM

## 2022-06-07 RX ORDER — LORAZEPAM 2 MG/ML
0.5 INJECTION INTRAMUSCULAR EVERY 4 HOURS PRN
Status: DISCONTINUED | OUTPATIENT
Start: 2022-06-07 | End: 2022-06-14

## 2022-06-07 RX ORDER — ENOXAPARIN SODIUM 100 MG/ML
40 INJECTION SUBCUTANEOUS EVERY 24 HOURS
Status: DISCONTINUED | OUTPATIENT
Start: 2022-06-07 | End: 2022-06-21 | Stop reason: HOSPADM

## 2022-06-07 RX ORDER — DIAZEPAM 5 MG
10 TABLET ORAL EVERY 6 HOURS PRN
Status: DISCONTINUED | OUTPATIENT
Start: 2022-06-07 | End: 2022-06-08

## 2022-06-07 RX ORDER — NICOTINE 21 MG/24HR
1 PATCH, TRANSDERMAL 24 HOURS TRANSDERMAL DAILY
Status: DISCONTINUED | OUTPATIENT
Start: 2022-06-08 | End: 2022-06-21 | Stop reason: HOSPADM

## 2022-06-07 RX ORDER — POTASSIUM CHLORIDE 750 MG/1
20 CAPSULE, EXTENDED RELEASE ORAL 2 TIMES DAILY
Status: DISCONTINUED | OUTPATIENT
Start: 2022-06-07 | End: 2022-06-21 | Stop reason: HOSPADM

## 2022-06-07 RX ORDER — MAGNESIUM SULFATE 1 G/100ML
1 INJECTION INTRAVENOUS ONCE
Status: COMPLETED | OUTPATIENT
Start: 2022-06-08 | End: 2022-06-08

## 2022-06-07 RX ORDER — TRAMADOL HYDROCHLORIDE 50 MG/1
50 TABLET ORAL EVERY 6 HOURS PRN
Status: DISCONTINUED | OUTPATIENT
Start: 2022-06-07 | End: 2022-06-09

## 2022-06-07 RX ORDER — CEFTRIAXONE SODIUM 1 G/50ML
1 INJECTION, SOLUTION INTRAVENOUS EVERY 24 HOURS
Status: DISCONTINUED | OUTPATIENT
Start: 2022-06-07 | End: 2022-06-11

## 2022-06-07 RX ORDER — PROCHLORPERAZINE 25 MG
12.5 SUPPOSITORY, RECTAL RECTAL EVERY 12 HOURS PRN
Status: DISCONTINUED | OUTPATIENT
Start: 2022-06-07 | End: 2022-06-21 | Stop reason: HOSPADM

## 2022-06-07 RX ORDER — CLONAZEPAM 0.5 MG/1
0.5 TABLET ORAL 2 TIMES DAILY PRN
Status: DISCONTINUED | OUTPATIENT
Start: 2022-06-07 | End: 2022-06-21 | Stop reason: HOSPADM

## 2022-06-07 RX ADMIN — HYDROMORPHONE HYDROCHLORIDE 0.5 MG: 1 INJECTION, SOLUTION INTRAMUSCULAR; INTRAVENOUS; SUBCUTANEOUS at 18:40

## 2022-06-07 RX ADMIN — IOPAMIDOL 54 ML: 755 INJECTION, SOLUTION INTRAVENOUS at 19:37

## 2022-06-07 RX ADMIN — SODIUM CHLORIDE: 9 INJECTION, SOLUTION INTRAVENOUS at 22:56

## 2022-06-07 RX ADMIN — HYDROMORPHONE HYDROCHLORIDE 0.5 MG: 1 INJECTION, SOLUTION INTRAMUSCULAR; INTRAVENOUS; SUBCUTANEOUS at 17:35

## 2022-06-07 RX ADMIN — SODIUM CHLORIDE 1000 ML: 9 INJECTION, SOLUTION INTRAVENOUS at 20:18

## 2022-06-07 ASSESSMENT — ENCOUNTER SYMPTOMS
GASTROINTESTINAL NEGATIVE: 1
RHINORRHEA: 1
NEUROLOGICAL NEGATIVE: 1
EYES NEGATIVE: 1
CARDIOVASCULAR NEGATIVE: 1
APPETITE CHANGE: 1
MUSCULOSKELETAL NEGATIVE: 1
HEMATOLOGIC/LYMPHATIC NEGATIVE: 1
WOUND: 1
ENDOCRINE NEGATIVE: 1
FATIGUE: 1
COUGH: 1
PSYCHIATRIC NEGATIVE: 1

## 2022-06-07 ASSESSMENT — ACTIVITIES OF DAILY LIVING (ADL)
ADLS_ACUITY_SCORE: 39
ADLS_ACUITY_SCORE: 38

## 2022-06-07 NOTE — ED TRIAGE NOTES
Comes from home where she lives alone and has only been sitting in her chair for past weeks as she is too weak to walk or put any weight on her legs.  Cough noted.  Friend has been brining her meals.  She has sores on on her bottom per family report.  Hospital stay about a month ago.
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
## 2022-06-07 NOTE — PROGRESS NOTES
Palliative Care Consultation    Reason for Consultation:  Raquel Sanchez, 72 year old, female is seen for a provider palliative care consult.  She is seen in the home because it takes a considerable and taxing effort to leave home.   Raquel saw her PCP Dr. David on 5/12/22.  At that visit he noted her recent health scare and recommended PT, which she declined.  She is noted to be compliant to medical interventions, but resistant.  Dr. David ordered briefs for her and a wheelchair.  Labs were drawn as well and her K+ was pretty low at 2.7.  Her potassium was increased from 20 meq daily to 20 meq bid.  She was scheduled to have labs done again in a week, but has not had them drawn yet.  Also noted, other low labs: CA 8.2, BUN 3.0 and Creat 0.48.    She is noting to have increasing back pain as well, she has gotten injections in the past but feels that they did not help.    Her daughter and a friend, Cal, are present today.  Her daughter is tearful because Raquel can no longer bear weight on her legs.  A month ago she was able to walk.  She was lying in her recliner, where she spends most of her time, soiled with stool.  Her daughter also relates that she cleaned up Raquel as best she could, but that her labia is red and swollen, she is red from her thighs to her hips and has open sores on her buttocks.    Raquel can do nothing for herself at this time.  The only time she can eat if someone brings her something.  She cannot change her diapers, so she sits in urine and stool until someone can.  She is smoking, I see some burn holes in her blanket.  She denies any alcohol for the past month.  She was supposed to get her potassium rechecked about 2-3 weeks ago, but has not.    Her daughter also relates that there is a possibility that she may have been exposed to covid and might have exposed Raquel.  Raquel is complaining of some coughing, whitish sputum.     Advanced Directives:  Full code    Attestation:   Total  time spent on evaluation, counseling, and coordination of care:    45 minutes.     History of Present Illness:  Raquel was sent to the ER on 4/21/22 for a fall at home.   She was transferred by ambulance to [place] with V-tach, hyponatremia and a critically low potassium level.   She was hospitalized from 4/21/22- 4/24/22, and discharged back to home.   During admission she was noted to have a prolonged QT interval as well and her celexa was discontinued.  A CT scan also noted a 4 cm infrarenal aneurysm.  She is noted to be a daily smoker and alcohol user.  Adjustments were made to her potassium dosage.    She had a follow up Lexiscan stress test, which was normal and showed an EF of 65%.     Current Outpatient Medications:      acetaminophen (TYLENOL) 325 MG tablet, Take 325 mg by mouth every 6 hours as needed for mild pain, Disp: , Rfl:      amLODIPine (NORVASC) 10 MG tablet, Take 1 tablet (10 mg) by mouth daily, Disp: 90 tablet, Rfl: 3     atorvastatin (LIPITOR) 40 MG tablet, TAKE 1 TABLET DAILY BY MOUTH, Disp: 90 tablet, Rfl: 1     clonazePAM (KLONOPIN) 0.5 MG tablet, TAKE 1 TABLET (0.5 MG) BY MOUTH 2 TIMES DAILY AS NEEDED FOR ANXIETY, Disp: 60 tablet, Rfl: 0     diazepam (VALIUM) 10 MG tablet, Take 1 tablet (10 mg) by mouth every 6 hours as needed for anxiety, Disp: 1 tablet, Rfl: 0     hydrOXYzine (ATARAX) 25 MG tablet, Take 25 mg by mouth 3 times daily as needed, Disp: , Rfl:      hydrOXYzine (VISTARIL) 25 MG capsule, TAKE 1 CAPSULE (25 MG) BY MOUTH 3 TIMES DAILY AS NEEDED FOR ITCHING, Disp: 120 capsule, Rfl: 3     Multiple Vitamins-Minerals (MULTIVITAMIN ADULTS 50+) TABS, Take 1 tablet by mouth daily, Disp: , Rfl:      potassium chloride ER (K-TAB) 20 MEQ CR tablet, Take 1 tablet (20 mEq) by mouth 2 times daily, Disp: 180 tablet, Rfl: 3     traMADol (ULTRAM) 50 MG tablet, TAKE ONE TABLET BY MOUTH EVERY 4-6 HOURS AS NEEDED FOR PAIN, Disp: , Rfl:      triamcinolone (ARISTOCORT HP) 0.5 % external cream, APPLY  SPARINGLY TO AFFECTED AREA THREE TIMES DAILY., Disp: 30 g, Rfl: 1      Allergies   Allergen Reactions     Tape [Adhesive Tape] Blisters     Seasonal Allergies      Poison Ivy Extract [Extract Of Poison Riddhi] Rash       Past Medical History:  No past medical history on file.      Past Surgical History:   Procedure Laterality Date     GYN SURGERY      hysterectomy     IR CONSULTATION FOR IR EXAM  1/4/2022         Family History:  Family History   Problem Relation Age of Onset     Diabetes No family hx of      Asthma No family hx of      Thyroid Disease No family hx of      Hypertension No family hx of      Hyperlipidemia No family hx of        Social History:   reports that she has been smoking cigarettes. She started smoking about 56 years ago. She has a 55.00 pack-year smoking history. She has never used smokeless tobacco. She reports current alcohol use. She reports that she does not use drugs.    Review Of Systems:    Performance status:  ECOG 4    Skin: rash  Eyes: glasses  Ears/Nose/Throat: has only 6 teeth and dentures that are ill fitted and she won't wear  Respiratory: Cough- (whitish sputum) and currently smokes  Cardiovascular: recent hx of Vtach  Gastrointestinal: poor appetite, diarrhea and incontinence  Genitourinary: incontinence  Musculoskeletal: back pain, muscular weakness and new inability to ambulate  Neurologic: numbness and tingling of two left fingers from chronically leaning on her elbow  Psychiatric: depression- note celexa recently discontinued due to low sodium  Hematologic/Lymphatic/Immunologic: negative  Endocrine: negative    Concerns/Worries: Daughter is very worried about [place] safety     PHYSICAL EXAM:   O2 sat on room air is 93%,   GENERAL: Well groomed, well developed, well nourished female who appears ill  HEENT: extra ocular movements intact, atraumatic,  normocephalic.  SKIN: Warm to touch, dry.  I am unable to visualize the reported area on her buttocks, she is not able  to move  RESP: No increased respiratory effort. Lungs clear to ausculation bilateral but breath sounds are diminished.  Her cough does sound productive.  CV: Rate and rhythm regular, no murmurs/rubs/gallops. Tachycardia present.   LYMPH: No lower extremity edema.  ABD: Soft, non tender, non distended, no palpable masses.  Normoactive bowel sounds.  No CVA tenderness bilaterally.  MS: Full range of motion  in extremities.  NEURO: Alert and oriented x 3.  PSYCH: Normal mood and affect, pleasant and agreeable during interview and exam.      Laboratory/Imaging:  Reviewed    Assessment/Plan:    Tobacco Cessation:   reports that she has been smoking cigarettes. She started smoking about 56 years ago. She has a 55.00 pack-year smoking history. She has never used smokeless tobacco.  Tobacco Cessation Action Plan: Information offered: Patient not interested at this time    I had a delia discussion with Raquel.  I explained that we cannot offer palliative or home care services at this time because she is not safe at home at this time.  I am concerned about the potassium level that she never had drawn, and the reason for why she continues to deteriorate and can no longer walk.  She agrees to go to the ER for evaluation and for assistance with placement to somewhere that she can be cared for and regain her strength.      Time spent on today's visit was 60 min including review of outside records, labs, face to face with patient discussing options and after visit calling there ER with report and coordinating with the palliative and home care team.     Louise Terrell  CNP, CWOCN  Hospice and Palliative Care

## 2022-06-07 NOTE — ANESTHESIA PREPROCEDURE EVALUATION
Anesthesia Pre-Procedure Evaluation    Patient: Raquel Sanchez   MRN: [id number] : 1950        Procedure : * No procedures listed *          History reviewed. No pertinent past medical history.   Past Surgical History:   Procedure Laterality Date     GYN SURGERY      hysterectomy     IR CONSULTATION FOR IR EXAM  2022      Allergies   Allergen Reactions     Tape [Adhesive Tape] Blisters     Seasonal Allergies      Poison Ivy Extract [Extract Of Poison Riddhi] Rash      Social History     Tobacco Use     Smoking status: Current Every Day Smoker     Packs/day: 1.00     Years: 55.00     Pack years: 55.00     Types: Cigarettes     Start date: 1966     Smokeless tobacco: Never Used   Substance Use Topics     Alcohol use: Yes     Alcohol/week: 0.0 standard drinks     Comment: occasional      Wt Readings from Last 1 Encounters:   22 65.3 kg (144 lb)              OUTSIDE LABS:  CBC:   Lab Results   Component Value Date    WBC 11.3 (H) 2022    WBC 16.0 (H) 10/14/2019    HGB 12.7 2022    HGB 14.6 10/14/2019    HCT 37.3 2022    HCT 40.5 10/14/2019     (H) 2022     10/14/2019     BMP:   Lab Results   Component Value Date     2022     2022    POTASSIUM 4.1 2022    POTASSIUM 2.7 (LL) 2022    CHLORIDE 104 2022    CHLORIDE 100 2022    CO2 21 2022    CO2 25 2022    BUN 3 (L) 2022    BUN 3 (L) 2022    CR 0.43 (L) 2022    CR 0.48 (L) 2022     (H) 2022     (H) 2022     COAGS:   Lab Results   Component Value Date    INR 0.97 2022     POC: No results found for: BGM, HCG, HCGS  HEPATIC:   Lab Results   Component Value Date    ALBUMIN 1.6 (L) 2022    PROTTOTAL 6.5 (L) 2022    ALT 18 2022    AST 38 2022    ALKPHOS 351 (H) 2022    BILITOTAL 0.6 2022     OTHER:   Lab Results   Component Value Date    LACT 1.5 2022    ROSA 7.9 (L)  06/07/2022    MAG 1.7 06/07/2022    LIPASE 85 06/07/2022    CRP 16.4 (H) 06/07/2022       Anesthesia Plan       - Procedure: Procedure only, no anesthetic delivered                    Consents            Postoperative Care            Comments:                Jvoanna Gibson, APRN CRNA

## 2022-06-08 ENCOUNTER — TELEPHONE (OUTPATIENT)
Dept: NEUROLOGY | Facility: CLINIC | Age: 72
End: 2022-06-08
Payer: MEDICARE

## 2022-06-08 ENCOUNTER — APPOINTMENT (OUTPATIENT)
Dept: MRI IMAGING | Facility: HOSPITAL | Age: 72
DRG: 177 | End: 2022-06-08
Attending: INTERNAL MEDICINE
Payer: MEDICARE

## 2022-06-08 ENCOUNTER — APPOINTMENT (OUTPATIENT)
Dept: SPEECH THERAPY | Facility: HOSPITAL | Age: 72
DRG: 177 | End: 2022-06-08
Attending: INTERNAL MEDICINE
Payer: MEDICARE

## 2022-06-08 ENCOUNTER — APPOINTMENT (OUTPATIENT)
Dept: PHYSICAL THERAPY | Facility: HOSPITAL | Age: 72
DRG: 177 | End: 2022-06-08
Attending: INTERNAL MEDICINE
Payer: MEDICARE

## 2022-06-08 ENCOUNTER — APPOINTMENT (OUTPATIENT)
Dept: CARDIOLOGY | Facility: HOSPITAL | Age: 72
DRG: 177 | End: 2022-06-08
Attending: INTERNAL MEDICINE
Payer: MEDICARE

## 2022-06-08 ENCOUNTER — APPOINTMENT (OUTPATIENT)
Dept: GENERAL RADIOLOGY | Facility: HOSPITAL | Age: 72
DRG: 177 | End: 2022-06-08
Attending: INTERNAL MEDICINE
Payer: MEDICARE

## 2022-06-08 LAB
ALBUMIN SERPL-MCNC: 1.5 G/DL (ref 3.4–5)
ALP SERPL-CCNC: 318 U/L (ref 40–150)
ALT SERPL W P-5'-P-CCNC: 18 U/L (ref 0–50)
ANION GAP SERPL CALCULATED.3IONS-SCNC: 8 MMOL/L (ref 3–14)
AST SERPL W P-5'-P-CCNC: 40 U/L (ref 0–45)
BASOPHILS # BLD AUTO: 0 10E3/UL (ref 0–0.2)
BASOPHILS NFR BLD AUTO: 0 %
BILIRUB SERPL-MCNC: 0.5 MG/DL (ref 0.2–1.3)
BUN SERPL-MCNC: 3 MG/DL (ref 7–30)
CALCIUM SERPL-MCNC: 7.4 MG/DL (ref 8.5–10.1)
CHLORIDE BLD-SCNC: 104 MMOL/L (ref 94–109)
CO2 SERPL-SCNC: 23 MMOL/L (ref 20–32)
CREAT SERPL-MCNC: 0.33 MG/DL (ref 0.52–1.04)
CRP SERPL-MCNC: 16.9 MG/L (ref 0–8)
EOSINOPHIL # BLD AUTO: 0 10E3/UL (ref 0–0.7)
EOSINOPHIL NFR BLD AUTO: 0 %
ERYTHROCYTE [DISTWIDTH] IN BLOOD BY AUTOMATED COUNT: 14.2 % (ref 10–15)
GFR SERPL CREATININE-BSD FRML MDRD: >90 ML/MIN/1.73M2
GLUCOSE BLD-MCNC: 88 MG/DL (ref 70–99)
HCT VFR BLD AUTO: 33.5 % (ref 35–47)
HGB BLD-MCNC: 11.3 G/DL (ref 11.7–15.7)
IMM GRANULOCYTES # BLD: 0 10E3/UL
IMM GRANULOCYTES NFR BLD: 0 %
LVEF ECHO: NORMAL
LYMPHOCYTES # BLD AUTO: 2 10E3/UL (ref 0.8–5.3)
LYMPHOCYTES NFR BLD AUTO: 22 %
MAGNESIUM SERPL-MCNC: 2 MG/DL (ref 1.6–2.3)
MCH RBC QN AUTO: 32.1 PG (ref 26.5–33)
MCHC RBC AUTO-ENTMCNC: 33.7 G/DL (ref 31.5–36.5)
MCV RBC AUTO: 95 FL (ref 78–100)
MONOCYTES # BLD AUTO: 1.4 10E3/UL (ref 0–1.3)
MONOCYTES NFR BLD AUTO: 15 %
NEUTROPHILS # BLD AUTO: 5.6 10E3/UL (ref 1.6–8.3)
NEUTROPHILS NFR BLD AUTO: 63 %
NRBC # BLD AUTO: 0 10E3/UL
NRBC BLD AUTO-RTO: 0 /100
PLATELET # BLD AUTO: 575 10E3/UL (ref 150–450)
POTASSIUM BLD-SCNC: 3.5 MMOL/L (ref 3.4–5.3)
PROT SERPL-MCNC: 5.8 G/DL (ref 6.8–8.8)
RBC # BLD AUTO: 3.52 10E6/UL (ref 3.8–5.2)
SODIUM SERPL-SCNC: 135 MMOL/L (ref 133–144)
WBC # BLD AUTO: 9 10E3/UL (ref 4–11)

## 2022-06-08 PROCEDURE — 258N000003 HC RX IP 258 OP 636: Performed by: INTERNAL MEDICINE

## 2022-06-08 PROCEDURE — 72146 MRI CHEST SPINE W/O DYE: CPT

## 2022-06-08 PROCEDURE — 250N000011 HC RX IP 250 OP 636

## 2022-06-08 PROCEDURE — 250N000011 HC RX IP 250 OP 636: Performed by: INTERNAL MEDICINE

## 2022-06-08 PROCEDURE — 80053 COMPREHEN METABOLIC PANEL: CPT | Performed by: INTERNAL MEDICINE

## 2022-06-08 PROCEDURE — 99233 SBSQ HOSP IP/OBS HIGH 50: CPT | Performed by: INTERNAL MEDICINE

## 2022-06-08 PROCEDURE — 70553 MRI BRAIN STEM W/O & W/DYE: CPT

## 2022-06-08 PROCEDURE — 72141 MRI NECK SPINE W/O DYE: CPT

## 2022-06-08 PROCEDURE — 85025 COMPLETE CBC W/AUTO DIFF WBC: CPT | Performed by: INTERNAL MEDICINE

## 2022-06-08 PROCEDURE — 86140 C-REACTIVE PROTEIN: CPT | Performed by: INTERNAL MEDICINE

## 2022-06-08 PROCEDURE — 93005 ELECTROCARDIOGRAM TRACING: CPT

## 2022-06-08 PROCEDURE — 92610 EVALUATE SWALLOWING FUNCTION: CPT | Mod: GN

## 2022-06-08 PROCEDURE — 255N000002 HC RX 255 OP 636: Performed by: RADIOLOGY

## 2022-06-08 PROCEDURE — A9585 GADOBUTROL INJECTION: HCPCS | Performed by: RADIOLOGY

## 2022-06-08 PROCEDURE — 99221 1ST HOSP IP/OBS SF/LOW 40: CPT | Performed by: NURSE PRACTITIONER

## 2022-06-08 PROCEDURE — 93306 TTE W/DOPPLER COMPLETE: CPT | Mod: 26 | Performed by: INTERNAL MEDICINE

## 2022-06-08 PROCEDURE — 97161 PT EVAL LOW COMPLEX 20 MIN: CPT | Mod: GP | Performed by: PHYSICAL THERAPIST

## 2022-06-08 PROCEDURE — 94375 RESPIRATORY FLOW VOLUME LOOP: CPT

## 2022-06-08 PROCEDURE — 72148 MRI LUMBAR SPINE W/O DYE: CPT

## 2022-06-08 PROCEDURE — 250N000013 HC RX MED GY IP 250 OP 250 PS 637: Performed by: INTERNAL MEDICINE

## 2022-06-08 PROCEDURE — 83735 ASSAY OF MAGNESIUM: CPT | Performed by: INTERNAL MEDICINE

## 2022-06-08 PROCEDURE — 93306 TTE W/DOPPLER COMPLETE: CPT

## 2022-06-08 PROCEDURE — 120N000001 HC R&B MED SURG/OB

## 2022-06-08 PROCEDURE — 99207 PR NO CHARGE LOS: CPT | Performed by: PSYCHIATRY & NEUROLOGY

## 2022-06-08 PROCEDURE — 258N000003 HC RX IP 258 OP 636

## 2022-06-08 PROCEDURE — 71045 X-RAY EXAM CHEST 1 VIEW: CPT

## 2022-06-08 PROCEDURE — 36415 COLL VENOUS BLD VENIPUNCTURE: CPT | Performed by: INTERNAL MEDICINE

## 2022-06-08 RX ORDER — GADOBUTROL 604.72 MG/ML
7.5 INJECTION INTRAVENOUS ONCE
Status: COMPLETED | OUTPATIENT
Start: 2022-06-08 | End: 2022-06-08

## 2022-06-08 RX ORDER — DOXYCYCLINE 100 MG/10ML
INJECTION, POWDER, LYOPHILIZED, FOR SOLUTION INTRAVENOUS
Status: COMPLETED
Start: 2022-06-08 | End: 2022-06-08

## 2022-06-08 RX ORDER — CEFTRIAXONE SODIUM 1 G/50ML
INJECTION, SOLUTION INTRAVENOUS
Status: COMPLETED
Start: 2022-06-08 | End: 2022-06-08

## 2022-06-08 RX ORDER — SODIUM CHLORIDE 9 MG/ML
INJECTION, SOLUTION INTRAVENOUS
Status: COMPLETED
Start: 2022-06-08 | End: 2022-06-08

## 2022-06-08 RX ORDER — ACETAMINOPHEN 10 MG/ML
INJECTION, SOLUTION INTRAVENOUS
Status: COMPLETED
Start: 2022-06-08 | End: 2022-06-08

## 2022-06-08 RX ORDER — MAGNESIUM SULFATE 1 G/100ML
INJECTION INTRAVENOUS
Status: COMPLETED
Start: 2022-06-08 | End: 2022-06-08

## 2022-06-08 RX ORDER — ACETAMINOPHEN 650 MG/1
650 SUPPOSITORY RECTAL EVERY 6 HOURS PRN
Status: DISCONTINUED | OUTPATIENT
Start: 2022-06-08 | End: 2022-06-21 | Stop reason: HOSPADM

## 2022-06-08 RX ADMIN — MAGNESIUM SULFATE 1 G: 1 INJECTION INTRAVENOUS at 00:27

## 2022-06-08 RX ADMIN — ENOXAPARIN SODIUM 40 MG: 40 INJECTION SUBCUTANEOUS at 00:25

## 2022-06-08 RX ADMIN — DOXYCYCLINE 100 MG: 100 INJECTION, POWDER, LYOPHILIZED, FOR SOLUTION INTRAVENOUS at 00:50

## 2022-06-08 RX ADMIN — GADOBUTROL 7.5 ML: 604.72 INJECTION INTRAVENOUS at 20:32

## 2022-06-08 RX ADMIN — SODIUM CHLORIDE: 9 INJECTION, SOLUTION INTRAVENOUS at 16:51

## 2022-06-08 RX ADMIN — SODIUM CHLORIDE: 9 INJECTION, SOLUTION INTRAVENOUS at 07:13

## 2022-06-08 RX ADMIN — DOXYCYCLINE 100 MG: 100 INJECTION, POWDER, LYOPHILIZED, FOR SOLUTION INTRAVENOUS at 22:03

## 2022-06-08 RX ADMIN — ACETAMINOPHEN 1000 MG: 10 INJECTION INTRAVENOUS at 04:38

## 2022-06-08 RX ADMIN — CEFTRIAXONE SODIUM 1 G: 1 INJECTION, SOLUTION INTRAVENOUS at 00:18

## 2022-06-08 RX ADMIN — ENOXAPARIN SODIUM 40 MG: 40 INJECTION SUBCUTANEOUS at 22:03

## 2022-06-08 RX ADMIN — DOXYCYCLINE 100 MG: 100 INJECTION, POWDER, LYOPHILIZED, FOR SOLUTION INTRAVENOUS at 00:54

## 2022-06-08 RX ADMIN — SODIUM CHLORIDE 100 ML: 9 INJECTION, SOLUTION INTRAVENOUS at 00:51

## 2022-06-08 RX ADMIN — ACETAMINOPHEN 1000 MG: 10 INJECTION, SOLUTION INTRAVENOUS at 04:38

## 2022-06-08 RX ADMIN — ACETAMINOPHEN 650 MG: 650 SUPPOSITORY RECTAL at 12:02

## 2022-06-08 RX ADMIN — MAGNESIUM SULFATE IN DEXTROSE 1 G: 10 INJECTION, SOLUTION INTRAVENOUS at 00:27

## 2022-06-08 ASSESSMENT — ACTIVITIES OF DAILY LIVING (ADL)
ADLS_ACUITY_SCORE: 38
ADLS_ACUITY_SCORE: 36
ADLS_ACUITY_SCORE: 38
ADLS_ACUITY_SCORE: 40
ADLS_ACUITY_SCORE: 40
ADLS_ACUITY_SCORE: 36
ADLS_ACUITY_SCORE: 38
ADLS_ACUITY_SCORE: 36
ADLS_ACUITY_SCORE: 40
ADLS_ACUITY_SCORE: 36

## 2022-06-08 NOTE — PROGRESS NOTES
06/08/22 1000   General Information   Onset of Illness/Injury or Date of Surgery 06/07/22   Referring Physician Ester Carbajal MD   Pertinent History of Current Problem Patient is a 72 year old female who was admitted on 6/7/22. Order placed for clinical swallow evaluation to rule out aspiration.   Past History of Dysphagia Patient reports that at home she eats soups and soft foods due to difficulty with chewing. She reports no other concerns regarding swallowing.   Type of Evaluation   Type of Evaluation Swallow Evaluation   Oral Motor   Oral Musculature anomalies present   Structural Abnormalities present   Mucosal Quality good   Dentition (Oral Motor)   Comment, Dentition (Oral Motor) Patient reports that she has dentures at home however she no longer wears them due to them fitting poorly.   Dentition (Oral Motor) significant number of missing teeth;dentition impacts chewing ability   Facial Symmetry (Oral Motor)   Facial Symmetry (Oral Motor) right side impairment   Right Side Facial Asymmetry minimal impairment   Jaw Function (Oral Motor)   Jaw Function (Oral Motor) other (see comments);range of motion impairment  (Patient is unable to open her mouth completely due to a prior jaw surgery.)   Cough/Swallow/Gag Reflex (Oral Motor)   Volitional Swallow (Oral Motor) WNL   Vocal Quality/Secretion Management (Oral Motor)   Vocal Quality (Oral Motor) WNL   Secretion Management (Oral Motor) WNL   General Swallowing Observations   Respiratory Support (General Swallowing Observations) nasal cannula   Current Diet/Method of Nutritional Intake (General Swallowing Observations, NIS) NPO   Swallowing Evaluation Clinical swallow evaluation   Clinical Swallow Evaluation   Feeding Assistance set up only required   Additional evaluation(s) completed today No   Rationale for completing additional evaluation A clinical bedside swallow evaluation cannot rule out silent aspiration. If provider or other suspects that patient is  silently aspirating than a video swallow study may be warranted.   Clinical Swallow Evaluation Textures Trialed thin liquids;pureed;mildly thick liquids   Clinical Swallow Eval: Thin Liquid Texture Trial   Mode of Presentation, Thin Liquids cup;self-fed   Volume of Liquid or Food Presented 4 oz.   Oral Phase of Swallow WFL   Pharyngeal Phase of Swallow intact   Diagnostic Statement No overt signs/symptoms of aspiration were observed during thin liquid trials.   Clinical Swallow Eval: Mildly Thick Liquids   Mode of Presentation cup;self-fed   Volume Presented 4 oz.   Oral Phase WFL   Pharyngeal Phase intact   Diagnostic Statement No signs/symptoms of aspiration were observed during mildly thickened liquid trials.   Clinical Swallow Evaluation: Puree Solid Texture Trial   Mode of Presentation, Puree spoon;self-fed   Volume of Puree Presented 3 oz.   Oral Phase, Puree WFL   Pharyngeal Phase, Puree intact   Diagnostic Statement No overt signs/symptoms of aspiration were observed during pureed texture trials. Patient demonstrate clearance of bolus from oral cavity following the swallow.   Swallowing Recommendations   Diet Consistency Recommendations thin liquids (level 0);pureed (level 4)   Supervision Level for Intake distant supervision needed   Mode of Delivery Recommendations bolus size, small;slow rate of intake   Postural Recommendations other (see comments)  (Sitting at 90 degrees)   Swallowing Maneuver Recommendations alternate food and liquid intake   Monitoring/Assistance Required (Eating/Swallowing) stop eating activities when fatigue is present;monitor for cough or change in vocal quality with intake   Recommended Feeding/Eating Techniques (Swallow Eval) maintain upright sitting position for eating;maintain upright posture during/after eating for 30 minutes   Medication Administration Recommendations, Swallowing (SLP) Bury in applesauce if patient is having increased difficulty with medications.    Instrumental Assessment Recommendations instrumental evaluation not recommended at this time   Comment, Swallowing Recommendations Patient is tolerating IDDSI 4 (pureed) and thin liquids.   General Therapy Interventions   Planned Therapy Interventions Dysphagia Treatment   Dysphagia treatment Instruction of safe swallow strategies;Compensatory strategies for swallowing   Clinical Impression   Criteria for Skilled Therapeutic Interventions Met (SLP Eval) Yes, treatment indicated   SLP Diagnosis Oral dysphagia   Problem List (SLP) Swallowing   Functional Limitations Related to Problem List (SLP) Patient is requiring a modified diet.   Risks & Benefits of therapy have been explained evaluation/treatment results reviewed;care plan/treatment goals reviewed;risks/benefits reviewed;current/potential barriers reviewed;participants voiced agreement with care plan;participants included;patient   Clinical Impression Comments Patient seen this AM for clinical bedside swallow evaluation. She trialed 3 oz. of pureed textures without difficulties when provided assistance of set-up prior to intake. Patient trialed mildly thickened liquids without demonstrating overt signs/symptoms of aspiration. She then trialed thin liquids and no overt signs/symptoms of aspiration. Patient did not demonstrate any overt signs/symptoms of aspiration during any trial. No advanced solid trials were done past pureed textures due to patient reporting that she is unable to chew advanced textures. She reported that her dentition impacts her ability to chew as well as the fact that food gets stuck in the sockets where her teeth used to be. Recommend IDDSI 4 (pureed) and thin liquids. At times during the evaluation patient was observed to take large bites/sips as well as having a fast rate of intake. Patient would benefit from a 1x follow up to assess diet recommendations and to provide education regarding compensatory strategies.   SLP Discharge  Planning   SLP Discharge Recommendation   (Defer to other care team members.)   SLP Rationale for DC Rec Patient would likely require assistance with meal preparation and set up, otherwise she is independent at mealtimes. Patient requires a modified diet of IDDSI 4 (pureed) and thin liquids however she reports that this has been her diet at home.   SLP Brief overview of current status  Recommend IDDSI 4 (pureed) and thin liquids. This is patients reported baseline diet due to difficulties with chewing. Will follow up 1x to assess diet recommendations and to provide education regarding compensatory strategies. Aspiration precautions should be followed due to patients overall weakness.   Therapy Certification   Start of Care Date 06/08/22   Certification date from 06/08/22   Certification date to 06/10/22   Medical Diagnosis Weakness    Total Evaluation Time   Total Evaluation Time (Minutes) 20   SLP Goals   Therapy Frequency (SLP Eval) one time eval and treatment only   SLP Predicted Duration/Target Date for Goal Attainment 06/10/22   SLP Goals Swallow   SLP: Safely tolerate diet without signs/symptoms of aspiration Pureed diet;Thin liquids;With use of compensatory swallow strategies       Recommendations for Feeding:  - Patient requires distant supervision with eating and drinking  - Patient must sit in the chair at 90 degrees (may require pillows behind back)  - Eliminate all distractions; turn off the TV  - Patient should only eat/drink when they are fully alert  - Encourage small bites and small sips  - Alternate between a bite of food and a sip of liquid  - Check for oral pocketing  - Swallow bite of food/liquid before offering another bite/sip   - Patient must remain upright in chair at least 30-45 minutes after oral intake    NOTE: If patient becomes too tired, there are noted changes in their vocal quality/breathing (wet and gurgly), or they begin coughing frequently, stop feeding immediately and complete  oral cares. SLP to re-assess swallowing

## 2022-06-08 NOTE — TELEPHONE ENCOUNTER
Called by JUVE shine about this patient.  Has some chronic low back pain and mild weakness but presented with worsening bilateral lower extermity weakness.  Per provider L>R diffuse weakness.  No UE waekness.   No sensory changes.  Reflexes he states are normal and L toe is upgoing.    No bowel/bladder symptoms to suggest cauda equina.      She was found to have COVID and elevated D Dimer/procalcitonin.  CK normal.      With preserved reflexes/upgoing toe in setting of weakness would consider spine imaging with MRI  Specifically cervical and thoracic, but given her LBP history and recent injection lumbar is reasonable as well.  If imaging is negative may be worsened in setting of COVID and would do intensive PT.  Patient should follow up in neurology as outpatient and consider EMG if symptoms persist.      Josue Robbins, DO

## 2022-06-08 NOTE — PLAN OF CARE
[facility] Inpatient Admission Note:    Patient admitted to 3230/3230-1 at approximately 2125 via cart accompanied by transport tech from emergency room . Report received from Shannan in SBAR format at 2115 via telephone. Patient transferred to bed via slide board.. Patient is alert and oriented X 3, reports pain; rates at 9 on 0-10 scale.  Patient oriented to room, unit, hourly rounding, and plan of care. Explained admission packet and patient handbook with patient bill of rights brochure. Will continue to monitor and document as needed.     Inpatient Nursing criteria listed below was met:    Health care directives status obtained and documented: No    Patient identifies a surrogate decision maker: Yes If yes, who: Daughter Jess Contact Information: [phone]     If initial lactic acid greater than 2.0, repeat lactic acid drawn within one hour of arrival to unit: NA. If no, state reason: Lactic 1.5     Clergy visit ordered if patient requests: No    Skin issues/needs documented: Yes    Isolation Patient: yes Education given, correct sign in place and documentation row added to PCS:  Yes    Fall Prevention Yes: Care plan updated, education given and documented, sticker and magnet in place: Yes    Care Plan initiated: Yes    Education Documented (including assessment): Yes    Patient has discharge needs : Yes If yes, please explain: Unable to ambulate alone; needs complete care assistance

## 2022-06-08 NOTE — PLAN OF CARE
"Most recent vitals: /68 (BP Location: Left arm, Patient Position: Supine)   Pulse 116   Temp (!) 96.3  F (35.7  C) (Tympanic)   Resp 18   Ht 1.575 m (5' 2\")   Wt 57.5 kg (126 lb 12.2 oz)   SpO2 94%   BMI 23.19 kg/m    Face to face report given with opportunity to observe patient.    A&O. Assessment as charted. Skin assessment as charted. Turned and repositioned. NPO with ice chips. IV fluid running 100 ml/hr. Tele placed at end of shift; awaiting tele report.     Report given to LON Hernández RN   6/7/2022  11:26 PM   "

## 2022-06-08 NOTE — PROGRESS NOTES
"CLINICAL NUTRITION SERVICES  -  ASSESSMENT NOTE    Raquel Sanchez : Admission Nutrition Risk Screen - unsure of weight loss, reduced appetite/intake and Provider Order - protein calorie malnutrition    72 yof admitted for weakness. COVID+. Pt has a past medical hx including tobacco and alcohol abuse, HLD, HTN. Family concerned pt is unable to care for herself, sits in her chair all day, does not get up. Neighbors will bring her food. Swallow evaluation today by SLP, altered textured diet ordered. Poor dentition makes chewing food difficult. Tends to eat pureed food at home. 34lb weight loss noted since November 2022. Only 1 meal documented so far with sub-optimal intake. Noted pt has 3 pin sized open areas to coccyx.    Diet Order: pureed (level 4), thin liquids  Intake: 25% intake x1 meal    Height: 5' 2\"  Weight: 128 lbs 8.45 oz  Body mass index is 23.51 kg/m .  Weight Status:  Normal BMI  Weight History: admit weight- 58.3kg. 23% weight loss in 7 months  Wt Readings from Last 10 Encounters:   06/08/22 58.3 kg (128 lb 8.5 oz)   05/12/22 65.3 kg (144 lb)   12/15/21 73.5 kg (162 lb)   11/15/21 75.3 kg (166 lb)   09/01/20 65.3 kg (144 lb)   12/09/19 56.7 kg (125 lb)   07/31/18 59.4 kg (131 lb)   01/10/18 57.2 kg (126 lb 3.2 oz)   11/29/17 57.6 kg (127 lb)   10/09/17 59 kg (130 lb)        Weight used to calculate needs: 58.3kg  Estimated Energy Needs: 7563-4466 kcals (25-30 Kcal/Kg)   Estimated Protein Needs: 60-70 grams protein (1-1.2 g pro/Kg)  Estimated Fluid Needs: 1564-2069  mL (1 mL/Kcal)    MALNUTRITION:  % Weight Loss:  > 10% in 6 months (severe malnutrition)  % Intake:  </= 75% for >/= 1 month (severe malnutrition)    Malnutrition Diagnosis: Severe malnutrition  In Context of:  Chronic illness or disease    NUTRITION DIAGNOSIS:  Malnutrition related to inadequate oral intake as evidenced by 20% weight loss in 6 months    NUTRITION RECOMMENDATIONS  - Encourage frequent small meals/snacks  - Offer Ensure with " meals  - may benefit from a daily multivitamin/mineral supplement    MONITORING AND EVALUATION:  RD will monitor intake, weight, labs

## 2022-06-08 NOTE — CONSULTS
INPATIENT ROUNDING NOTE  6/8/2022    Patient: Raquel Sanchez    Physician of Record: Hospitalist Service    Admitting diagnosis: Weakness [R53.1]  Esophagitis [K20.90]  Pleural effusion [J90]  Generalized muscle weakness [M62.81]  Failure to thrive in adult [R62.7]  Decubitus ulcer of buttock [L89.309]  COVID-19 [U07.1]    Length of stay: 1    Patient is seen for wound care of her buttock.    CURRENT MEDICATIONS:  Continuous Medications:  Current Facility-Administered Medications   Medication Last Rate     sodium chloride 100 mL/hr at 06/08/22 0713       Scheduled Medications:  Current Facility-Administered Medications   Medication Dose Route Frequency     [Held by provider] amLODIPine  10 mg Oral Daily     [Held by provider] atorvastatin  40 mg Oral QPM     cefTRIAXone  1 g Intravenous Q24H     doxycycline (VIBRAMYCIN) IV  100 mg Intravenous Q12H     enoxaparin ANTICOAGULANT  40 mg Subcutaneous Q24H     nicotine  1 patch Transdermal Daily     nicotine   Transdermal Q8H     [Held by provider] potassium chloride ER  20 mEq Oral BID     sodium chloride (PF)  3 mL Intracatheter Q8H       PRN Medications:  Current Facility-Administered Medications   Medication Dose Route Frequency     acetaminophen  650 mg Rectal Q6H PRN     [Held by provider] acetaminophen  325 mg Oral Q6H PRN     [Held by provider] clonazePAM  0.5 mg Oral BID PRN     lidocaine 4%   Topical Q1H PRN     lidocaine (buffered or not buffered)  0.1-1 mL Other Q1H PRN     LORazepam  0.5 mg Intravenous Q4H PRN     prochlorperazine  5 mg Intravenous Q6H PRN    Or     prochlorperazine  5 mg Oral Q6H PRN    Or     prochlorperazine  12.5 mg Rectal Q12H PRN     sodium chloride (PF)  3 mL Intracatheter q1 min prn     [Held by provider] traMADol  50 mg Oral Q6H PRN       SUBJECTIVE:   Nausea: No. Vomiting: No. Pain control: good.    PHYSICAL EXAM:   Vital signs: /59 (BP Location: Left arm, Patient Position: Supine, Cuff Size: Adult Regular)   Pulse 104    "Temp 98  F (36.7  C) (Tympanic)   Resp 18   Ht 1.575 m (5' 2\")   Wt 58.3 kg (128 lb 8.5 oz)   SpO2 93%   BMI 23.51 kg/m     BMI: Body mass index is 23.51 kg/m .   General: Normal, healthy, cooperative, in no acute distress, alert   Lungs: respirations are non-labored   Buttock significant dermatitis with scattered ulceration noted to the buttock    Right antecubital arm: bruising without open wound noted due to saline lock rubbing/putting pressure on the area   Extremities: No cyanosis, clubbing or edema noted bilaterally in Upper and Lower Extremities   Neurological: without deficit    INPUT/OUTPUT:      Intake/Output Summary (Last 24 hours) at 6/8/2022 1408  Last data filed at 6/8/2022 1329  Gross per 24 hour   Intake 250 ml   Output --   Net 250 ml       I/O last 3 completed shifts:  In: 10 [I.V.:10]  Out: -     LABS:    Last CBC Rrsults:   Recent Labs   Lab Test 06/08/22  0535 06/07/22  1650 10/14/19  2240   WBC 9.0 11.3* 16.0*   RBC 3.52* 3.98 4.30   HGB 11.3* 12.7 14.6   HCT 33.5* 37.3 40.5   MCV 95 94 94   MCH 32.1 31.9 34.0*   MCHC 33.7 34.0 36.0   RDW 14.2 14.3 12.7   * 630* 296       Last Comprehensive Metabolic panel:  Recent Labs   Lab Test 06/08/22  0535 06/07/22  2312 06/07/22  1650 05/12/22  0919 11/15/21  1101     --  135 137 138   POTASSIUM 3.5 4.1 4.1 2.7* 3.8   CHLORIDE 104  --  104 100 106   CO2 23  --  21 25 25   ANIONGAP 8  --  10 12 7   GLC 88  --  116* 140* 93   BUN 3*  --  3* 3* 4*   CR 0.33* 0.37* 0.43* 0.48* 0.60   GFRESTIMATED >90 >90 >90 >90 >90   ROSA 7.4*  --  7.9* 8.2* 8.9   BILITOTAL 0.5  --  0.6  --  0.3   ALKPHOS 318*  --  351*  --  60   ALT 18  --  18  --  10   AST 40  --  38  --  13       Recent Labs   Lab Test 06/08/22  0535 06/07/22  2312 06/07/22  1650 11/15/21  1101   MAG 2.0 1.7 1.7  --    ALBUMIN 1.5*  --  1.6* 3.2*       ASSESSMENT:    72 year old female admitted for Weakness [R53.1]  Esophagitis [K20.90]  Pleural effusion [J90]  Generalized muscle " weakness [M62.81]  Failure to thrive in adult [R62.7]  Decubitus ulcer of buttock [L89.309]  COVID-19 [U07.1].  Hospital day 1.  Seen for wound care, dermatitis.    PLAN:   Recommend removal of saline lock to right antecubital space   Treat buttock with barrier ointment and frequent repositioning of the area for pressure relief

## 2022-06-08 NOTE — ED NOTES
Jain catheter placed. Urine sample obtained. Pt has Normal Saline 1000 ml fluid bolus started. EKG being obtained. To be admitted to hospital shortly.

## 2022-06-08 NOTE — PHARMACY-MEDICATION REGIMEN REVIEW
[facility]    Pharmacy      Antimicrobial Stewardship Note     Current antimicrobial therapy:  Anti-infectives (From now, onward)    Start     Dose/Rate Route Frequency Ordered Stop    06/07/22 2300  cefTRIAXone in d5w (ROCEPHIN) intermittent infusion 1 g         1 g  over 30 Minutes Intravenous EVERY 24 HOURS 06/07/22 2252 06/07/22 2300  doxycycline (VIBRAMYCIN) 100 mg in sodium chloride 0.9 % 100 mL intermittent infusion         100 mg  over 1-2 Hours Intravenous EVERY 12 HOURS 06/07/22 2252            Indication: UTI & Community Acquired Pneumonia    Days of Therapy: Day 1 (Rocephin & Vibramycin)     Pertinent labs:  Creatinine   Creatinine   Date Value Ref Range Status   06/08/2022 0.33 (L) 0.52 - 1.04 mg/dL Final   06/07/2022 0.37 (L) 0.52 - 1.04 mg/dL Final   06/07/2022 0.43 (L) 0.52 - 1.04 mg/dL Final     WBC   WBC Count   Date Value Ref Range Status   06/08/2022 9.0 4.0 - 11.0 10e3/uL Final   06/07/2022 11.3 (H) 4.0 - 11.0 10e3/uL Final     Procalcitonin   Procalcitonin   Date Value Ref Range Status   06/07/2022 0.26 (H) <0.05 ng/mL Final     Comment:     Interpretation and Recommendations  <0.05 ng/mL Normal  Very low risk of bacterial infection.  Strongly discourage antibiotics.    0.05-0.24 ng/mL Low risk of systemic infection. Local bacterial infection possible.  Assess other clinical features of infection.  Discourage antibiotics.    0.25-0.49 ng/mL Possible early systemic infection or localized infection  Encourage antibiotics only in correct clinical context.  Consider obtaining blood cultures or other relevant cultures.  Recheck PCT in 6-12 hours to ensure baseline low level.  If repeat PCT is rising, consider early systemic infection and consider starting antibiotics.    0.50-1.99 ng/mL Moderate risk of systemic infection.  Recommend antibiotics.  Evaluate culture results and clinical features to target antibacterial therapy.  Obtain blood cultures and other relevant cultures if not  done.    If empiric antibiotics were started, recheck PCT in:       2 days to guide antibiotic de-escalation.       Discontinue or de-escalate antibiotics when PCT concentration is <80% of      peak or abs PCT <0.5.    If empiric antibiotics were NOT started, recheck PCT in:       6-24 hours to re-evaluate need for antibiotics.     2.00-9.99 g/mL High risk for progression to severe sepsis.  Strongly recommend initiating or continuing antibiotics.  Evaluate culture results and clinical features to target antibacterial therapy.  Obtain blood cultures and other relevant cultures if not done.  Repeat PCT in 2 days to guide antibiotic de-escalation.  Consider de-escalating antibiotics when PCT concentration is <80% or peak or abs PCT < 0.05.    Greater than or equal to 10 ng/mL Very high likelihood of severe sepsis or septic shock.  Strongly recommend initiating or continuing antibiotics.  Evaluate culture results and clinical features to target antibacterial therapy.  Obtain blood cultures and other relevant cultures if not done.  Repeat PCT in 2 days to guide antibiotic de-escalation.  Consider de-escalating antibiotics when PCT concentration is <80% of peak or abs PCT < 1.       CRP   CRP Inflammation   Date Value Ref Range Status   06/08/2022 16.9 (H) 0.0 - 8.0 mg/L Final   06/07/2022 16.4 (H) 0.0 - 8.0 mg/L Final       Culture Results:        Recommendations/Interventions:  1. Continue current antimicrobial therapy (Rocephin & Vibramycin).    [place], Pharmacy Intern  June 8, 2022

## 2022-06-08 NOTE — ED PROVIDER NOTES
History     Chief Complaint   Patient presents with     Generalized Weakness     HPI  Raquel Sanchez is a 72 year old female who presents to the ER with family with concerns that she no longer is able to care for herself in her home.  She has become very weak and fatigued and she has been extremely sedentary and not been out of her chair in her home since she was back home after admission in late April.  Hospice called today indicating that they came into Raquel's home for a palliative care review and found that she has not been out of her recliner since May 21, 2022.  Patient was admitted in late April for V. tach, hyponatremia hypokalemia was medically managed and sent home.  She has become so weak that she has been not able to take care of her self or even get out of the recliner she has not been able to do her daily activity or even take care of her self on a daily basis.  Somebody has had to come into the home and try and help her clean her self from, not been able to cook for herself somebody's had to bring in meals and even help her with changing her close.  She is daughter was not able to help due to the fact that she recently got  and was out of town as well also diagnosed with COVID-19.  Patient's states that she has had a mild cough and chest congestion but no chest pain or shortness of breath.  She has had no lightheadedness dizziness nausea or vomiting.  She has had no recent fevers.  She is so weak that she was unable really to stand on her own today and family had to transport her themselves.  He and home health also noted that she had significant skin breakdown on her lower back buttocks and backside of her upper extremities.  They are painful at this time.  Patient has a history of hyponatremia hypokalemia hypertension hyperlipidemia alcohol abuse closed right hip fracture falls, tobacco use,Dysthymia.     Allergies:  Allergies   Allergen Reactions     Tape [Adhesive Tape] Blisters      Seasonal Allergies      Poison Ivy Extract [Extract Of Poison Ivy] Rash       Problem List:    Patient Active Problem List    Diagnosis Date Noted     Weakness 06/07/2022     Priority: Medium     Alcohol use 04/21/2022     Priority: Medium     Hyperlipemia 04/21/2022     Priority: Medium     Hypertension 04/21/2022     Priority: Medium     Prolonged Q-T interval on ECG 04/21/2022     Priority: Medium     Tobacco abuse 10/15/2019     Priority: Medium     Pre-op exam 10/15/2019     Priority: Medium     Hyponatremia 10/15/2019     Priority: Medium     Fall at home, initial encounter 10/15/2019     Priority: Medium     Closed right hip fracture, initial encounter (H) 10/15/2019     Priority: Medium     Alcohol abuse, uncomplicated 10/15/2019     Priority: Medium     Chronic, continuous use of opioids 01/10/2018     Priority: Medium     Patient is followed by Pelon David MD for ongoing prescription of pain medication.  All refills should only be approved by this provider, or covering partner.    Medication(s): Norco 5/325mg.   Maximum quantity per month: #60  Clinic visit frequency required: Q 6  months     Controlled substance agreement:  Encounter-Level CSA - 01/25/2017:          Controlled Substance Agreement - Scan on 2/2/2017 10:36 AM : SCHEDULED MEDICATION USE AGREEMENT (below)              Pain Clinic evaluation in the past: No    DIRE Total Score(s):  No flowsheet data found.    Last [facility] website verification:  done on 9.12.17   [email or web address]       Dysthymia 09/28/2017     Priority: Medium     Controlled substance agreement signed 01/25/2017     Priority: Medium     ACP (advance care planning) 05/16/2016     Priority: Medium     Advance Care Planning 5/16/2016: ACP Review of Chart / Resources Provided:  Reviewed chart for advance care plan.  Raquel Sanchez has no plan or code status on file. Discussed available resources and provided with information. Confirmed code status reflects current  choices pending further ACP discussions.  Confirmed/documented legally designated decision makers.  Added by Kathy Darnell                Past Medical History:    History reviewed. No pertinent past medical history.    Past Surgical History:    Past Surgical History:   Procedure Laterality Date     GYN SURGERY      hysterectomy     IR CONSULTATION FOR IR EXAM  1/4/2022       Family History:    Family History   Problem Relation Age of Onset     Diabetes No family hx of      Asthma No family hx of      Thyroid Disease No family hx of      Hypertension No family hx of      Hyperlipidemia No family hx of        Social History:  Marital Status:   [5]  Social History     Tobacco Use     Smoking status: Current Every Day Smoker     Packs/day: 1.00     Years: 55.00     Pack years: 55.00     Types: Cigarettes     Start date: 1/1/1966     Smokeless tobacco: Never Used   Substance Use Topics     Alcohol use: Yes     Alcohol/week: 0.0 standard drinks     Comment: occasional     Drug use: No        Medications:    acetaminophen (TYLENOL) 325 MG tablet  amLODIPine (NORVASC) 10 MG tablet  atorvastatin (LIPITOR) 40 MG tablet  clonazePAM (KLONOPIN) 0.5 MG tablet  diazepam (VALIUM) 10 MG tablet  hydrOXYzine (ATARAX) 25 MG tablet  hydrOXYzine (VISTARIL) 25 MG capsule  Multiple Vitamins-Minerals (MULTIVITAMIN ADULTS 50+) TABS  potassium chloride ER (K-TAB) 20 MEQ CR tablet  traMADol (ULTRAM) 50 MG tablet  triamcinolone (ARISTOCORT HP) 0.5 % external cream          Review of Systems   Constitutional: Positive for appetite change and fatigue.   HENT: Positive for congestion and rhinorrhea.    Eyes: Negative.    Respiratory: Positive for cough.    Cardiovascular: Negative.    Gastrointestinal: Negative.    Endocrine: Negative.    Genitourinary: Negative.    Musculoskeletal: Negative.    Skin: Positive for wound.   Neurological: Negative.    Hematological: Negative.    Psychiatric/Behavioral: Negative.        Physical Exam   BP:  116/78  Pulse: (!) 138  Temp: 97.1  F (36.2  C)  Resp: 16  SpO2: (!) 84 %      Physical Exam  Vitals and nursing note reviewed.   Constitutional:       Appearance: She is ill-appearing.      Comments: Frail and pale.   HENT:      Head: Normocephalic and atraumatic.      Right Ear: Tympanic membrane, ear canal and external ear normal.      Left Ear: Tympanic membrane, ear canal and external ear normal.      Nose: Congestion and rhinorrhea present.      Mouth/Throat:      Mouth: Mucous membranes are dry.   Eyes:      Extraocular Movements: Extraocular movements intact.      Conjunctiva/sclera: Conjunctivae normal.      Pupils: Pupils are equal, round, and reactive to light.   Cardiovascular:      Rate and Rhythm: Regular rhythm. Tachycardia present.      Pulses: Normal pulses.      Heart sounds: Normal heart sounds.   Pulmonary:      Effort: Pulmonary effort is normal.      Breath sounds: Normal breath sounds.   Abdominal:      General: Abdomen is flat.      Tenderness: There is no abdominal tenderness. There is no right CVA tenderness, left CVA tenderness or guarding.   Musculoskeletal:         General: Normal range of motion.      Cervical back: Normal range of motion and neck supple.   Skin:     Capillary Refill: Capillary refill takes less than 2 seconds.      Findings: Lesion present.          Neurological:      General: No focal deficit present.      Mental Status: She is alert and oriented to person, place, and time. Mental status is at baseline.   Psychiatric:         Mood and Affect: Mood normal.         ED Course        72-year-old female presents the ER mild distress secondary to extreme weakness and fatigue.  Patient's not really been able to take care of herself in her own home for several weeks.  She is covered in feces.  She has significant skin breakdown on the backside of her lower back and buttocks upper thighs.  She is pale she looks very thin she is moderately tachycardic on arrival.  However  vitals show that her blood pressure was stable slight tachycardia O2 sats were normal and she had no fever.  IV was established after several attempts.  Anesthesia had to come down for IV placement.  They were able to draw labs initially and patient's labs ultimately looked relatively normal except an elevated D-dimer.  Patient had a CT scan PE study to rule out PE since she been extremely sedentary I think she was at high risk.  When she returned back from x-ray.  We did give the patient a liter of fluid we did do an EKG that showed some sinus tach no other abnormalities I did contact and speak with hospitalist  commended patient needs to be admitted for extreme weakness skin breakdown and probable nursing home placement when she is medically stable.  We were able to obtain a CT scan of the chest for PE she does not have any specific signs of a PE however her COVID-19 test came back positive and her CT scan does show some fluid debris in the left main stem of her bronchus with thickening of the lingular bronchus and occlusion of the left lower bronchus left hilar and Endo bronchial mass is not excluded recommendations are for bronchoscopy she also has a large left pleural effusion with compression of atelectasis in the lower lobe and a mild to moderate diffuse salpingitis and notable only to be at the GE junction.  Has remained stable throughout our evaluation we did give her some Dilaudid for pain.  Patient was excepted to the MedSur floor at this time.  I spoke with daughter about the admission and she is reassured and feels comfortable with patient being admitted at this time.  She was discharged in stable condition                     EKG Interpretation:      Interpreted by Delicia Jackson PA-C  Time reviewed: 06/07/22, 8:26pm  Symptoms at time of EKG: tachycardia   Rhythm: normal sinus   Rate: normal  Axis: normal  Ectopy: none  Conduction: normal  ST Segments/ T Waves: No ST-T wave changes  Q  Waves: none  Comparison to prior: Unchanged    Clinical Impression: normal EKG               Results for orders placed or performed during the hospital encounter of 06/07/22 (from the past 24 hour(s))   CBC with platelets differential    Narrative    The following orders were created for panel order CBC with platelets differential.  Procedure                               Abnormality         Status                     ---------                               -----------         ------                     CBC with platelets and d...[[id number]]  Abnormal            Final result                 Please view results for these tests on the individual orders.   Comprehensive metabolic panel   Result Value Ref Range    Sodium 135 133 - 144 mmol/L    Potassium 4.1 3.4 - 5.3 mmol/L    Chloride 104 94 - 109 mmol/L    Carbon Dioxide (CO2) 21 20 - 32 mmol/L    Anion Gap 10 3 - 14 mmol/L    Urea Nitrogen 3 (L) 7 - 30 mg/dL    Creatinine 0.43 (L) 0.52 - 1.04 mg/dL    Calcium 7.9 (L) 8.5 - 10.1 mg/dL    Glucose 116 (H) 70 - 99 mg/dL    Alkaline Phosphatase 351 (H) 40 - 150 U/L    AST 38 0 - 45 U/L    ALT 18 0 - 50 U/L    Protein Total 6.5 (L) 6.8 - 8.8 g/dL    Albumin 1.6 (L) 3.4 - 5.0 g/dL    Bilirubin Total 0.6 0.2 - 1.3 mg/dL    GFR Estimate >90 >60 mL/min/1.73m2   CRP inflammation   Result Value Ref Range    CRP Inflammation 16.4 (H) 0.0 - 8.0 mg/L   Blood gas venous and oxyhgb   Result Value Ref Range    pH Venous 7.44 (H) 7.32 - 7.43    pCO2 Venous 35 (L) 40 - 50 mm Hg    pO2 Venous 33 25 - 47 mm Hg    Bicarbonate Venous 23 21 - 28 mmol/L    FIO2 21     Oxyhemoglobin Venous 60 (L) 70 - 75 %    Base Excess/Deficit (+/-) -0.4 -7.7 - 1.9 mmol/L   Lactic acid whole blood   Result Value Ref Range    Lactic Acid 1.5 0.7 - 2.0 mmol/L   INR   Result Value Ref Range    INR 0.97 0.85 - 1.15   Troponin I   Result Value Ref Range    Troponin I High Sensitivity 7 <54 ng/L   NT pro BNP   Result Value Ref Range    N terminal Pro BNP  Inpatient 125 0 - 900 pg/mL   Lipase   Result Value Ref Range    Lipase 85 73 - 393 U/L   CK total   Result Value Ref Range    CK 37 30 - 225 U/L   Magnesium   Result Value Ref Range    Magnesium 1.7 1.6 - 2.3 mg/dL   D dimer quantitative   Result Value Ref Range    D-Dimer Quantitative 1.84 (H) 0.00 - 0.50 ug/mL FEU    Narrative    This D-dimer assay is intended for use in conjunction with a clinical pretest probability assessment model to exclude pulmonary embolism (PE) and deep venous thrombosis (DVT) in outpatients suspected of PE or DVT. The cut-off value is 0.50 ug/mL FEU.   CBC with platelets and differential   Result Value Ref Range    WBC Count 11.3 (H) 4.0 - 11.0 10e3/uL    RBC Count 3.98 3.80 - 5.20 10e6/uL    Hemoglobin 12.7 11.7 - 15.7 g/dL    Hematocrit 37.3 35.0 - 47.0 %    MCV 94 78 - 100 fL    MCH 31.9 26.5 - 33.0 pg    MCHC 34.0 31.5 - 36.5 g/dL    RDW 14.3 10.0 - 15.0 %    Platelet Count 630 (H) 150 - 450 10e3/uL    % Neutrophils 73 %    % Lymphocytes 15 %    % Monocytes 12 %    % Eosinophils 0 %    % Basophils 0 %    % Immature Granulocytes 0 %    NRBCs per 100 WBC 0 <1 /100    Absolute Neutrophils 8.2 1.6 - 8.3 10e3/uL    Absolute Lymphocytes 1.7 0.8 - 5.3 10e3/uL    Absolute Monocytes 1.3 0.0 - 1.3 10e3/uL    Absolute Eosinophils 0.0 0.0 - 0.7 10e3/uL    Absolute Basophils 0.0 0.0 - 0.2 10e3/uL    Absolute Immature Granulocytes 0.1 <=0.4 10e3/uL    Absolute NRBCs 0.0 10e3/uL   Procalcitonin   Result Value Ref Range    Procalcitonin 0.26 (H) <0.05 ng/mL   Asymptomatic COVID-19 Virus (Coronavirus) by PCR Nasopharyngeal    Specimen: Nasopharyngeal; Swab   Result Value Ref Range    SARS CoV2 PCR Positive (A) Negative    Narrative    Testing was performed using the tu.nr Xpress SARS-CoV-2 Assay on the   Cepheid Gene-Xpert Instrument Systems. Additional information about   this Emergency Use Authorization (EUA) assay can be found via the Lab   Guide. This test should be ordered for the detection of  SARS-CoV-2 in   individuals who meet SARS-CoV-2 clinical and/or epidemiological   criteria. Test performance is unknown in asymptomatic patients. This   test is for in vitro diagnostic use under the FDA EUA for   laboratories certified under CLIA to perform high complexity testing.   This test has not been FDA cleared or approved. A negative result   does not rule out the presence of PCR inhibitors in the specimen or   target RNA in concentration below the limit of detection for the   assay. The possibility of a false negative should be considered if   the patient's recent exposure or clinical presentation suggests   COVID-19. This test was validated by [facility]. This laboratory is certified under the Clinical Laboratory Improve  ment Amendments (CLIA) as qualified to perform high complexity testing.   Urine Drugs of Abuse Screen    Narrative    The following orders were created for panel order Urine Drugs of Abuse Screen.  Procedure                               Abnormality         Status                     ---------                               -----------         ------                     Urine Drugs of Abuse Scr...[[id number]]                                                   Please view results for these tests on the individual orders.       Medications   0.9% sodium chloride BOLUS (1,000 mLs Intravenous New Bag 6/7/22 2018)   HYDROmorphone (PF) (DILAUDID) injection 0.5 mg (0.5 mg Intravenous Given 6/7/22 1735)   HYDROmorphone (PF) (DILAUDID) injection 0.5 mg (0.5 mg Intravenous Given 6/7/22 1840)   iopamidol (ISOVUE-370) solution 54 mL (54 mLs Intravenous Given 6/7/22 1937)   sodium chloride (PF) 0.9% PF flush 100 mL (50 mLs Intravenous Given 6/7/22 1936)       Assessments & Plan (with Medical Decision Making)     I have reviewed the nursing notes.    I have reviewed the findings, diagnosis, plan and need for follow up with the patient.      New Prescriptions    No  medications on file       Final diagnoses:   COVID-19   Generalized muscle weakness   Failure to thrive in adult   Esophagitis   Pleural effusion   Decubitus ulcer of buttock       6/7/2022   [facility]     Delicia Jackson PA-C  06/07/22 2449

## 2022-06-08 NOTE — DISCHARGE INSTRUCTIONS
What to expect when you have contrast    During your exam, we will inject  contrast  into your vein or artery. (Contrast is a clear liquid with iodine in it. It shows up on X-rays.)    You may feel warm or hot. You may have a metal taste in your mouth and a slight upset stomach. You may also feel pressure near the kidneys and bladder. These effects will last about 1 to 3 minutes.    Please tell us if you have:   Sneezing    Itching   Hives    Swelling in the face   A hoarse voice   Breathing problems   Other new symptoms    Serious problems are rare.  They may include:   Irregular heartbeat    Seizures   Kidney failure             Tissue damage   Shock     Death    If you have any problems during the exam, we  will treat them right away.    When you get home    Call your hospital if you have any new symptoms in the next 2 days, like hives or swelling. (Phone numbers are at the bottom of this page.) Or call your family doctor.     If you have wheezing or trouble breathing, call 911.    Self-care  -Drink at least 4 extra glasses of water today.   This reduces the stress on your kidneys.  -Keep taking your regular medicines.    The contrast will pass out of your body in your  Urine(pee). This will happen in the next 24 hours. You  will not feel this. Your urine will not  change color.    If you have kidney problems or take metformin    Drink 4 to 8 large glasses of water for the next  2 days, if you are not on a fluid restriction.    ?If you take metformin (Glucophage or Glucovance) for diabetes, keep taking it.      ?Your kidney function tests are abnormal.  If you take Metformin, do not take it for 48 hours. Please go to your clinic for a blood test within 3 days after your exam before the restarting this medicine.     (Note to provider:please give patient prescription for lab tests.)    ?Special instructions: **    I have read and understand the above information.    Patient Sign  Here:______________________________________Date:________Time:______    Staff Sign Here:________________________________________Date:_______Time:______      Radiology Departments:     ?[facility]: [phone] ?[place]: [phone]     ?[place]: [phone] ?[place]:[phone]      ?[place]: [phone]  ?[place]: [phone]  ?[place]:[phone]    ?[facility]:[phone]  ?[facility]:[phone]   2.98

## 2022-06-08 NOTE — PROGRESS NOTES
06/08/22 1700   Quick Adds   Type of Visit Initial PT Evaluation   Living Environment   People in Home alone   Current Living Arrangements house   Living Environment Comments Ramp to enter home   Self-Care   Usual Activity Tolerance poor   Current Activity Tolerance poor   Regular Exercise No   Equipment Currently Used at Home wheelchair, manual;shower chair   Fall history within last six months yes   Number of times patient has fallen within last six months 1   General Information   Onset of Illness/Injury or Date of Surgery 06/07/22   Referring Physician Dr Benton   Patient/Family Therapy Goals Statement (PT) feel better and get stronger   Pertinent History of Current Problem (include personal factors and/or comorbidities that impact the POC) Patient was admitted on 6/7/2022 for COVID, weakness and failure to thrive. She reports having a cardiac workup in [place] a few weeks ago and was discharged home and has since been very immobile and feels she is overall unable to move very well. She states she is unsure what the cause of her weakness is, but she feels she is unable to walk cannot perform her daily activities. She has notable sores on her buttocks and has spent the majority of her time in a wheelchair or in bed/recliner. She has not been ambulating. She has a neighbor come and do her laundry, cooking and cleaning and help with transfers and her cares.   Cognition   Affect/Mental Status (Cognition) WFL   Orientation Status (Cognition) oriented x 3   Follows Commands (Cognition) WFL   Pain Assessment   Patient Currently in Pain   (Buttocks due to sores)   Integumentary/Edema   Integumentary/Edema Comments Noted sores on buttocks and posterior thighs   Posture    Posture Kyphosis   Range of Motion (ROM)   ROM Comment Passive ROM is symmetrical and WFL for mobility and transfers. AROM is WFL for mobility and transfers but has slightly decreased strength on L compared to R that silghtly limits full AROM on L with  PF, hip FLEX and knee EXT   Strength (Manual Muscle Testing)   Strength Comments Decreased strength appreciated throughout B LEs with decreased strength appreciated in L PFs, hip flexor and knee EXT on L compared to R graded 3+/5 compared to 4-/5 on R. Able to perform functional supine to sit demonstrating adequate core strength and UEs are grossly 4/5 all planes of motions   Bed Mobility   Bed Mobility supine-sit   Supine-Sit [place] (Bed Mobility) modified independence   Impairments Contributing to Impaired Bed Mobility decreased strength   Assistive Device (Bed Mobility) bed rails   Transfers   Transfers sit-stand transfer   Sit-Stand Transfer   Sit-Stand [place] (Transfers) moderate assist (50% patient effort)   Comment, (Sit-Stand Transfer) Patient performe with physical assist from therapist and aide, refused use of walker for sit to stand, unable to stand erect with noted kyphosis and forward flexed posture on lumbar spine - notes chronic back pain and difficulty with posture   Gait/Stairs (Locomotion)   Comment, (Gait/Stairs) Unable to perform gait or stairs today   Muscle Tone   Muscle Tone Comments Overall dercreased muscle tone in B LEs - symmetrical atrophy of B LEs but edematous B LEs L>R   Clinical Impression   Criteria for Skilled Therapeutic Intervention Yes, treatment indicated   PT Diagnosis (PT) Physical deconditioning, B E weakness   Influenced by the following impairments weakness in B LEs and noted atrophy   Functional limitations due to impairments difficulty with all transfers and functional mobiity activities   Clinical Presentation (PT Evaluation Complexity) Stable/Uncomplicated   Clinical Presentation Rationale due to current comorbidities   Clinical Decision Making (Complexity) low complexity   Planned Therapy Interventions (PT) bed mobility training;home exercise program;neuromuscular re-education;postural re-education;strengthening;stretching;transfer training;progressive  activity/exercise   Anticipated Equipment Needs at Discharge (PT)   (None)   Risk & Benefits of therapy have been explained evaluation/treatment results reviewed;care plan/treatment goals reviewed;risks/benefits reviewed   Clinical Impression Comments Presentation is consistent with physical deconditioning and impaired functional mobility likely due to muscular atrophy associated with chronic immobility. There doesn't appear to be a strong neurological or systemic pattern that would explain her deconditioning. She is expected to improve with skilled intervention focusing on strengthening and progressive activity to improve her tolerance and ability to perform weight bearing activities and ambulation. At this time, she is not safe to discharge home independently and would be a good candidate for short term rehab   PT Discharge Planning   PT Discharge Recommendation (DC Rec) Transitional Care Facility   PT Rationale for DC Rec due to current functional status   PT Brief overview of current status min Ax1 for supine to sit, mod Ax2 for sit to stand and unable to ambulate or perform stairs, fatigues easily   Total Evaluation Time   Total Evaluation Time (Minutes) 30   Physical Therapy Goals   PT Frequency 6x/week   PT Predicted Duration/Target Date for Goal Attainment 06/20/22   PT Goals Transfers;Bed Mobility;Gait   PT: Bed Mobility Modified independent;Supine to/from sit   PT: Transfers Minimal assist;Sit to/from stand;Assistive device   PT: Gait Minimal assist;Rolling walker;25 feet

## 2022-06-08 NOTE — CARE PLAN
Prior to Admission Medication Reconciliation:     Medications added:   [x] None  [] As listed below:    Medications deleted:   [] None  [x] As listed below:    Valium- 1 days supply given in December- therapy completed    Tramadol- short term therapy completed    Medications marked for review/removal by attending:  [x] None  [] As listed below:    Changes made to existing medications:   [x] None  [] Updated strengths and frequencies to most current.   [] As listed below:    Pertinent notes/medications patient takes different than prescribed:     Pt takes klonopin and hydroxyzine scheduled    Uses triamcinolone only PRN for itching    Last times/dates taken verified with patient:  [x] Yes- completed myself  [] Prepared PTA medlist for review only. (will not be available to review personally)  [] Did not review with patient. Rx verification only. Review completed by nursing.    [] Nurse completed no changes made (double checked entries)  [] Unable to review with patient at this time:    Allergies listed at another location:  []Allergies match allergies listed in Epic  []Other allergies listed:    Allergy review:    [x]Did not review: reviewed by nursing  []Did not review: pt unable at this time  []Verified current existing allergies or NKDA: no changes made   []Patient confirmed current existing allergies and new allergies added:    Medication reconciliation sources:   [x]Patient  []Patient family member/emergency contact: **  [x][facility] Report Review  [x]Epic Chart Review  [x]Care Everywhere review  []Pharmacy med list/phone call: **  [x]Outside meds dispense report: see below  []Nursing home or Assisted Living MAR:  []Other: **    Pharmacy desired at discharge: [facility]    Is patient on coumadin?   [x]No  []Yes    Requests for consultation by provider or pharmacist:   [x] Patient understands why all of their meds were prescribed and how to take them. No questions.   [] Managing party has no questions.   [] Patient/  managing party has questions about the following:  [] Did not review with patient. Cannot assess.     Fill dates and reported compliancy:  [x] Fill dates coincide with reported compliancy for all/most maintenance meds.   [] Not applicable. Patient is not taking any maintenance medications at this time.   [] Fill dates do not coincide with compliancy with maintenance meds. See notes in PTA medlist and in comments.    [] Fill dates do not coincide with the following medications but pt reports compliancy:  [] Did not review with patient. Cannot assess.     Historian accuracy:  [] Excellent- alert and oriented, understands why meds were prescribed and how to take, able to answer specifics  [x] Good- alert and oriented, understands why meds were prescribed and how to take, some confusion   [] Fair- alert and oriented, doesn't know medications without list, cannot answer specifics about medications, but has a decent process for which to take at home  [] Poor- does not know medications, may not have a process to take at home, may be cognitively unable to review at this time  []Medication management done by family member or facility, no concerns about historian accuracy.   [] Did not review with patient. Cannot assess.     Medication Management:  [x] Manages meds independently  [] Family member/ other party manages meds/assists:  [] Meds managed by staff at facility  [] Meds set up by home care, family/other party helps administer  [] Meds set up by home care, self administers  [] Did not review with patient. Cannot assess.     Other medications aside from PTA:  [x] Denies taking any medications aside from those listed in PTA meds  [] Reports taking another medication(s) but cannot recall the name(s)  [] Refuses to say.  [] Did not review with patient. Cannot assess.     Comments: reported that she didn't know her meds but as long as I listed them and what they are prescribed for she was able to confirm them.       Sarah  Alexandru on 6/8/2022 at 8:15 AM       Notifying appropriate party of changes/additions/discrepancies:  []No pertinent changes made, notification not necessary.   [x] Notified attending provider via text page/phone call  [] Notified attending provider in person  [] Notified pharmacy  [] Notified nurse  [] Medications have not been reconciled by a provider yet, notification not necessary  [] Pt is not admitted to floor yet, PTA meds completed before admission.   Medications Prior to Admission   Medication Sig Dispense Refill Last Dose     acetaminophen (TYLENOL) 325 MG tablet Take 325 mg by mouth every 6 hours as needed for pain or fever   Past Week at Unknown time     amLODIPine (NORVASC) 10 MG tablet Take 1 tablet (10 mg) by mouth daily 90 tablet 3 6/7/2022 at AM     atorvastatin (LIPITOR) 40 MG tablet TAKE 1 TABLET DAILY BY MOUTH 90 tablet 1 6/7/2022 at AM     clonazePAM (KLONOPIN) 0.5 MG tablet TAKE 1 TABLET (0.5 MG) BY MOUTH 2 TIMES DAILY AS NEEDED FOR ANXIETY (Patient taking differently: Take 0.5 mg by mouth 2 times daily) 60 tablet 0 6/7/2022 at Unknown time     hydrOXYzine (VISTARIL) 25 MG capsule TAKE 1 CAPSULE (25 MG) BY MOUTH 3 TIMES DAILY AS NEEDED FOR ITCHING (Patient taking differently: Take 25 mg by mouth 3 times daily) 120 capsule 3 6/7/2022 at Unknown time     Multiple Vitamins-Minerals (MULTIVITAMIN ADULTS 50+) TABS Take 1 tablet by mouth daily   6/7/2022 at AM     potassium chloride ER (K-TAB) 20 MEQ CR tablet Take 1 tablet (20 mEq) by mouth 2 times daily 180 tablet 3 6/7/2022 at AM     triamcinolone (ARISTOCORT HP) 0.5 % external cream APPLY SPARINGLY TO AFFECTED AREA THREE TIMES DAILY. (Patient taking differently: Apply topically 3 times daily as needed (wide spread itching)) 30 g 1 Past Week at Unknown time                 Medication Dispense History (from 6/8/2021 to 6/8/2022)  Expand All  Collapse All    Atorvastatin Calcium     Dispensed Days Supply Quantity Provider Pharmacy   ATORVASTATIN  40MG TABLET 05/02/2022 90 90 Units [facility]...   ATORVASTATIN 40MG TABLET 02/01/2022 90 90 Units [facility] - [facility]...   ATORVASTATIN 40MG TABLET 11/16/2021 90 90 Units [facility]...        Citalopram Hydrobromide     Dispensed Days Supply Quantity Provider Pharmacy   CITALOPRAM 40MG TABLET 05/02/2022 90 90 Units [facility] - [facility]...   CITALOPRAM 40MG TABLET 02/01/2022 90 90 Units [facility] - Fa...   CITALOPRAM 40MG TABLET 11/01/2021 90 90 Units [facility] - Fa...   CITALOPRAM HYDROBROMIDE 40MG TAB 11/01/2021 90 90 tablet [facility]   CITALOPRAM   TAB 40M 08/04/2021 90 90 tablet [facility]...   CITALOPRAM 40MG TABLET 08/04/2021 90 90 Units [facility] - Fa...        Potassium Chloride     Dispensed Days Supply Quantity Provider Pharmacy   POTASSIUM CL 20MEQ ER TABLET 05/13/2022 90 180 Units [facility]...   POTASSIUM CL 20MEQ ER TABLET [facility]2022 [facility]        Triamcinolone Acetonide     Dispensed Days Supply Quantity Provider Pharmacy   TRIAMCINOLONE 0.5% CREAM 08/16/2021 30 30 g [facility] Pharmacy...        amLODIPine Besylate     Dispensed Days Supply Quantity Provider Pharmacy   AMLODIPINE 10MG TABLET 05/02/2022 90 90 Units [facility] - Fa...   AMLODIPINE 10MG TABLET 02/01/2022 90 90 Units [facility] - Fa...   AMLODIPINE 10MG TABLET 11/15/2021 90 90 Units [facility]...        clonazePAM     Dispensed Days Supply Quantity Provider Pharmacy   CLONAZEPAM 0.5MG TABLET 05/21/2022 30 60 Units [facility] Pharmacy...   CLONAZEPAM 0.5MG TABLET 04/21/2022 30 60 Units [facility]...   CLONAZEPAM  0.5MG TABLET 03/20/2022 30 60 Units [facility]...   CLONAZEPAM 0.5MG TABLET 02/15/2022 30 60 Units [facility]...   CLONAZEPAM 0.5MG TABLET 01/16/2022 30 60 Units [facility]...   CLONAZEPAM 0.5MG TABLET 12/20/2021 30 60 Units [facility]...   CLONAZEPAM 0.5MG TABLET 11/22/2021 30 60 Units [facility]...   CLONAZEPAM 0.5MG TABLET 10/22/2021 30 60 Units [facility]...   CLONAZEPAM 0.5MG TABLET 09/15/2021 30 60 Units [facility]...   CLONAZEPAM 0.5MG TABLET 08/17/2021 30 60 Units [facility]...   CLONAZEPAM 0.5MG TABLET 07/21/2021 30 60 Units [facility]...   CLONAZEPAM 0.5MG TABLET 06/18/2021 30 60 Units [facility]...        diazePAM     Dispensed Days Supply Quantity Provider Pharmacy   DIAZEPAM 10MG TABLET 12/17/2021 1 1 Units [facility]...        hydrOXYzine Pamoate     Dispensed Days Supply Quantity Provider Pharmacy   HYDROXYZINE PAMOATE 25MG CAP 05/18/2022 40 120 Units [facility]...   HYDROXYZINE PAMOATE 25MG CAP 03/20/2022 40 120 Units [facility]...   HYDROXYZINE PAMOATE 25MG CAP 01/16/2022 40 120 Units [facility]...   HYDROXYZINE PAMOATE 25MG CAP 11/10/2021 40 120 Units [facility]...   HYDROXYZINE PAMOATE 25MG CAP 08/16/2021 30 90 Units [facility]...        predniSONE     Dispensed Days Supply Quantity Provider Pharmacy   PREDNISONE 20MG TABLET 11/15/2021 5 10 Units [facility]...        traMADol HCl     Dispensed Days Supply Quantity Provider Pharmacy   TRAMADOL 50MG TABLET 01/17/2022 3 15 Units [facility]...        Disclaimer    Certain  dispenses may not be available or accurate in this report, including over-the-counter medications, low cost prescriptions, prescriptions paid for by the patient or non-participating sources, or errors in insurance claims information. The provider should independently verify medication history with the patient.    External Sources

## 2022-06-08 NOTE — PROGRESS NOTES
06/08/22 1000   Signing Clinician's Name / Credentials   Signing clinician's name / credentials Nasra Farnsworth MS CCC-SLP   Quick Adds   Rehab Discipline SLP   Quick Adds Interventions Speech Language Pathology   SLP - Dysphagia/Swallow   Minutes of Treatment   (Evaluation only)   Symptoms Noted During/After Treatment None   Treatment Detail Clinical bedside swallow evaluation completed this AM. m   SLP Discharge Planning   SLP Discharge Recommendation   (Defer to other care team members.)   SLP Rationale for DC Rec Patient would likely require assistance with meal preparation and set up, otherwise she is independent at mealtimes. Patient requires a modified diet of IDDSI 4 (pureed) and thin liquids however she reports that this has been her diet at home.   SLP Brief overview of current status  Recommend IDDSI 4 (pureed) and thin liquids. This is patients reported baseline diet due to difficulties with chewing. Will follow up 1x to assess diet recommendations and to provide education regarding compensatory strategies. Aspiration precautions should be followed due to patients overall weakness.   Additional Documentation   SLP Plan Follow up 1x to assess diet modifications and provide education on compensatory strategies.   Total Session Time   Total Session Time (minutes)   (Evaluation only)       Recommendations for Feeding:  - Patient requires distant supervision with eating and drinking  - Patient must sit in the chair at 90 degrees (may require pillows behind back)  - Eliminate all distractions; turn off the TV  - Patient should only eat/drink when they are fully alert  - Encourage small bites and small sips  - Alternate between a bite of food and a sip of liquid  - Check for oral pocketing  - Swallow bite of food/liquid before offering another bite/sip   - Patient must remain upright in chair at least 30-45 minutes after oral intake    NOTE: If patient becomes too tired, there are noted changes in their  vocal quality/breathing (wet and gurgly), or they begin coughing frequently, stop feeding immediately and complete oral cares. SLP to re-assess swallowing

## 2022-06-08 NOTE — H&P
[facility] [address]    History and Physical - Hospitalist Service       Date of Admission:  6/7/2022    Assessment & Plan      Raquel Sanchez is a 72 year old female admitted on 6/7/2022. She presents from home because of weakness and inability to take care of herself or even perform simplest tasks. Admitted to treat acute problems (see below) and NH placement.     Hospital problems:   1. Generalized muscle weakness.  Believe it is multifactorial.  She has UTI, tested positive for COVID, very malnourished.  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  We will treat above-mentioned conditions, PT will see her.  Fall precautions  2.  Long QT history with VT. Episode happened April 22nd 2022.  she had also low potassium and magnesium.  Today magnesium is 1.7,  but potassium is 4.1.  She is not on any antiarrhythmics.  Echo April 22 showed good ejection fraction.  She had stress test after that and followed with the cardiology.  She has scheduled appointment with cardiology 6/10/2022.  We will avoid QT prolonging medications and she will be on telemetry.  3.  Tobacco and alcohol abuse.  We will give nicotine patch.  She said alcohol last time she was able to drink was a month ago.  4. Confirmed COVID-19 infection.  No respiratory distres and she does not require oxygen supplementation.  Her blood work which would support active COVID infection also was unremarkable.  I will not treat her but monitor.  5.  Elevated D-dimer she has no PE.  Will use only DVT prophylaxis.  6.  UTI we will use Rocephin  7.  Protein calorie malnutrition.  We will get dietary consult.  8.  Abnormal chest CT with left effusion.  Fluid and debris's dependent portion of the left mainstem bronchus with thickening of the lingular bronchus and occlusion of the left lower lobe bronchus left hilar or endobronchial mass not excluded.  Before we consider bronchoscopy which will be requiring transfer, I we  will try to treat possible infection unfortunately I cannot give her albuterol and Mucomyst before chest PT.  This is because long QT she has spoke with the RT and we will try to do flutter valve tonight.  If she is stable overnight no arrhythmia, normal potassium and magnesium we may consider albuterol and Mucomyst or EZ Pap with close monitoring.  9.  Possible aspiration.  N.p.o. and swallow study in the morning.       Symptom Onset Unknown   Date of 1st Positive Test Today.   Vaccination Status Declines Vaccine       - COVID-19 special precautions, continuous pulse-ox, isolation  - Oxygen: continue current support with O2 Device: None (Room air) at  ; titrate to keep SpO2 between 90-96%    Diet: NPO for Medical/Clinical Reasons Except for: Ice Chips    DVT Prophylaxis: Enoxaparin (Lovenox) SQ  Jain Catheter: Not present  Central Lines: None  Cardiac Monitoring: ACTIVE order. Indication: QTc prolonging medication (48 hours)  Code Status: Full Code      Clinically Significant Risk Factors Present on Admission             # Hypoalbuminemia: Albumin = 1.6 g/dL (Ref range: 3.4 - 5.0 g/dL) on admission, will monitor as appropriate          Disposition Plan   Expected Discharge: in 3-4 days  Anticipated discharge location: NH  Delays: Very possible due to complex medical problems and treatment failure.   The patient's care was discussed with the Bedside Nurse, Patient and Patient's Family.    Ester Carbajal MD  Hospitalist Service  [facility]  Securely message with the Vocera Web Console (learn more here)  Text page via [employer] Paging/Directory         ______________________________________________________________________    Chief Complaint   Multiple.  Most importantly weakness, buttock pain, mild cough    History is obtained from the patient, electronic health record and emergency department physician, daughter    History of Present Illness   Raquel Sanchez is a 72 year old female who has past medical  history significant for tobacco and alcohol abuse, prolonged QT, fall with hip fracture who lives alone and gets some help from family, who was brought to emergency room because of the weakness.  Family is concerned that she is no longer able to care for herself at home.  She has become very weak and fatigued and she has been extremely sedentary and not been out of her chair in her home almost since she was back home after admission in late April.  Some neighbors brings her food cleans her when she soils herself.  Recently she was seen and evaluated by hospice and palliative care per primary care physician's request.  Patient herself is agreeable to go to nursing home.  History is important with the recent admission April 22 with ventricular tachycardia with low potassium and magnesium.  She was treated with amiodarone.  Seen by cardiology.  Followed with a stress test which was unremarkable and showed good ejection fraction also.  2week cardiac monitor showed sinus tachycardia and sinus bradycardia.  Tachycardia 34% of the time ventricular ectopy with 6% PVCs.  No A. fib.  No patient triggered events.  Ejection fraction was 65% and nuclear stress test was normal.    May 12, 2022 she saw her primary for leg weakness and primary referred palliative care.  She is progressively getting weaker.  Primary recommended PT but she declined it for now.  Primary also did not encourage her to get more injections because they are not working.   Wheelchair prescribed and labs to monitor electrolytes were ordered. Palliative care saw the patient on the day of admission.  She was found sitting in recliner where she spends most of her time soiled with stool.  She recently was exposed to COVID from her daughter who went for her honeymoon trip to [facility] and many people were exposed there.   Patient was seen in the emergency room and found in stable condition only abnormal vital sign was persistent tachycardia.  She did not require  oxygen all ED stay.  Admission labs were significant for albumin 1.6, protein 6.5, procalcitonin 0.26 and potassium 4.1 and magnesium 1.7.  White blood cell count 11.3 and mild thrombocytosis, and UA showed white blood cell count 24.  CT showed no PE, no aortic dissection.  Fluid the debris's dependent portion of the left mainstem bronchus with thickening of the lingula bronchus and occlusion of the left lower lobe bronchus left hilar or endobronchial mass not excluded.  Bronchoscopy recommended.  Large left pleural effusion with compressive atelectasis left lower lobe.  Mild to moderate diffuse esophagitis.  Review of Systems    12 points of review of system obtained.  Only positive is weakness, cough with clear phlegm, and buttock pain due to excoriation and redness.    Past Medical History    I have reviewed this patient's medical history and updated it with pertinent information if needed.   Fall, hip fracture, prolonged QT, hypokalemia, tobacco and alcohol abuse.  Past Surgical History   I have reviewed this patient's surgical history and updated it with pertinent information if needed.  Past Surgical History:   Procedure Laterality Date     GYN SURGERY      hysterectomy     IR CONSULTATION FOR IR EXAM  1/4/2022       Social History   I have reviewed this patient's social history and updated it with pertinent information if needed.  Social History     Tobacco Use     Smoking status: Current Every Day Smoker     Packs/day: 1.00     Years: 55.00     Pack years: 55.00     Types: Cigarettes     Start date: 1/1/1966     Smokeless tobacco: Never Used   Substance Use Topics     Alcohol use: Yes     Alcohol/week: 0.0 standard drinks     Comment: occasional     Drug use: No       Family History     Hypertension    Prior to Admission Medications   Prior to Admission Medications   Prescriptions Last Dose Informant Patient Reported? Taking?   Multiple Vitamins-Minerals (MULTIVITAMIN ADULTS 50+) TABS  Self Yes No   Sig:  Take 1 tablet by mouth daily   acetaminophen (TYLENOL) 325 MG tablet  Self Yes No   Sig: Take 325 mg by mouth every 6 hours as needed for mild pain   amLODIPine (NORVASC) 10 MG tablet  Self No No   Sig: Take 1 tablet (10 mg) by mouth daily   atorvastatin (LIPITOR) 40 MG tablet   No No   Sig: TAKE 1 TABLET DAILY BY MOUTH   clonazePAM (KLONOPIN) 0.5 MG tablet   No No   Sig: TAKE 1 TABLET (0.5 MG) BY MOUTH 2 TIMES DAILY AS NEEDED FOR ANXIETY   diazepam (VALIUM) 10 MG tablet   No No   Sig: Take 1 tablet (10 mg) by mouth every 6 hours as needed for anxiety   hydrOXYzine (ATARAX) 25 MG tablet   Yes No   Sig: Take 25 mg by mouth 3 times daily as needed   hydrOXYzine (VISTARIL) 25 MG capsule  Self No No   Sig: TAKE 1 CAPSULE (25 MG) BY MOUTH 3 TIMES DAILY AS NEEDED FOR ITCHING   potassium chloride ER (K-TAB) 20 MEQ CR tablet   No No   Sig: Take 1 tablet (20 mEq) by mouth 2 times daily   traMADol (ULTRAM) 50 MG tablet   Yes No   Sig: TAKE ONE TABLET BY MOUTH EVERY 4-6 HOURS AS NEEDED FOR PAIN   triamcinolone (ARISTOCORT HP) 0.5 % external cream  Self No No   Sig: APPLY SPARINGLY TO AFFECTED AREA THREE TIMES DAILY.      Facility-Administered Medications: None     Allergies   Allergies   Allergen Reactions     Tape [Adhesive Tape] Blisters     Seasonal Allergies      Poison Ivy Extract [Extract Of Poison Ivy] Rash       Physical Exam   Vital Signs: Temp: (!) 96.5  F (35.8  C) Temp src: Tympanic BP: 107/71 Pulse: (!) 123   Resp: 18 SpO2: 92 % O2 Device: None (Room air)    Weight: 126 lbs 12.23 oz    General: She is not in distress.  Disheveled.  Normocephalic atraumatic, poor dentition.  Neck: No JVD.  Cardiac: Regular rhythm rate, tachycardia.  No S3 no S4.  Lungs: Clear on the right but left mid and lower lobes with diminished breath sounds and crackles.  Abdomen: Soft nontender nondistended.  : Jain catheter in place.  No CVA tenderness.  Musculoskeletal, mild sarcopenia.  Neurologic exam diffuse weakness no focal  findings.  Normal sensory exam.  Skin: There is severe redness superficial excoriations and maceration of the skin both buttocks perianal area and both groin areas.  See pictures.  Psychiatric exam she is awake alert oriented x4.    Data   Data reviewed today: I reviewed all medications, new labs and imaging results over the last 24 hours. I personally reviewed the EKG tracing showing Normal sinus rhythm, tachycardia, prolonged QT..    Recent Labs   Lab 06/07/22  2312 06/07/22  1650   WBC  --  11.3*   HGB  --  12.7   MCV  --  94   PLT  --  630*   INR  --  0.97   NA  --  135   POTASSIUM 4.1 4.1   CHLORIDE  --  104   CO2  --  21   BUN  --  3*   CR 0.37* 0.43*   ANIONGAP  --  10   ROSA  --  7.9*   GLC  --  116*   ALBUMIN  --  1.6*   PROTTOTAL  --  6.5*   BILITOTAL  --  0.6   ALKPHOS  --  351*   ALT  --  18   AST  --  38   LIPASE  --  85     11.3 (H)    \    12.7    /    630 (H)   N 73    L N/A    135    104    3 (L) /   ------------------------------------ 116 (H)   ALT 18   AST 38    (H)   ALB 1.6 (L)   Ca 7.9 (L)  4.1    21    0.37 (L) \    % RETIC N/A    LDH N/A  Troponin N/A    BNP N/A    CK 37  INR 0.97   PTT N/A    D-dimer 1.84 (H)    Fibrinogen N/A    Antithrombin N/A  Ferritin N/A  CRP 16.4 (H)    IL-6 N/A  No results found for this or any previous visit (from the past 24 hour(s)).

## 2022-06-08 NOTE — PROGRESS NOTES
Bedside spirometry test and NIF maneuver performed as ordered by provider to acess resp function and  muscle strength. Her NIF was -[id number], FVC- 1.0 /38% of predicted and FEV1 was 0.90-44% of predicted.

## 2022-06-08 NOTE — PLAN OF CARE
"/59 (BP Location: Left arm, Patient Position: Supine, Cuff Size: Adult Regular)   Pulse 104   Temp 98  F (36.7  C) (Tympanic)   Resp 18   Ht 1.575 m (5' 2\")   Wt 58.3 kg (128 lb 8.5 oz)   SpO2 93%   BMI 23.51 kg/m       Pt A&O, VSS, afebrile, and reports pain rated 8/10. Given PRN tylenol with some relief. O2 on 1 L via NC. Jain cath in place with adequate output. Turned Q2hrs and barrier cream applied to bottom. Rest of assessment as charted. IV infusing NS @ 100 mL/hr. Received IV doxycycline. Call light within reach and bed alarm on.    Face to face report given with opportunity to observe patient.    Report given to LON Tobias RN   6/8/2022  4:03 PM  "

## 2022-06-08 NOTE — PROGRESS NOTES
Care Transitions focused note:      Multiple attempts made to contact patient on her room phone or patient's daughter on her cell phone to complete CM assessment. No answer on any attempt. Writer will continue to reach out.

## 2022-06-08 NOTE — PROGRESS NOTES
[facility]    Medicine Progress Note - Hospitalist Service    Date of Admission:  6/7/2022    Assessment & Plan          Raquel Sanchez is a 72 year old female admitted on 6/7/2022. She presents from home because of weakness and inability to take care of herself or even perform simplest tasks. Admitted to treat acute problems (see below) and NH placement.     Hospital problems:   1.  Lower extremities muscle weakness.    It appears that the patient's weakness is a much more profound in the lower extremities.  Particularly left lower extremity.  In light of this, we will further investigate this with MRI of the lumbar spine as well as the brain.  I spoke with Dr. Robbins, neurologist at [facility], who noted that patient having more weakness on the left side but Babinski on the right may indicate higher level of involvement; thus, imaging the whole spine is indicated.  MRI of the cervical, thoracic, and lumbar spine have been ordered.    2.  Confirmed COVID-19 infection.    Patient reports significant coughing but no respiratory distress is reported and she is not requiring any supplemental oxygen.  We will just continue to monitor and treat patient's symptoms.    3.  Abnormal chest CT with left effusion.    Fluid and debris's dependent portion of the left mainstem bronchus with thickening of the lingular bronchus and occlusion of the left lower lobe bronchus left hilar or endobronchial mass not excluded.  Before we consider bronchoscopy which will be requiring transfer, I we will try to treat possible infection unfortunately I cannot give her albuterol and Mucomyst before chest PT.  This is because long QT she has spoke with the RT and we will try to do flutter valve tonight.  Patient is remained stable overnight with no arrhythmia, normal potassium and magnesium; albuterol and Mucomyst or EZ Pap have been started.    4. Long QT history with VT.   Episode happened April 22nd 2022.  she had  also low potassium and magnesium.   She is not on any antiarrhythmics.  Echo April 22 showed good ejection fraction.  She had stress test after that and followed with the cardiology.  She has scheduled appointment with cardiology 6/10/2022.  Avoid QT prolonging medications and continue on telemetry.    5.  Elevated D-dimer she has no PE.    Will use only DVT prophylaxis.    6.  UTI   IV ceftriaxone.  We will follow patient's culture results    7.  Protein calorie malnutrition.    We will get dietary consult.    8.  Tobacco and alcohol abuse.    We will give nicotine patch.  She said alcohol last time she was able to drink was a month ago.    9.  Possible aspiration.    Patient was evaluated by speech therapy and       Symptom Onset Unknown   Date of 1st Positive Test Today.   Vaccination Status Declines Vaccine       - COVID-19 special precautions, continuous pulse-ox, isolation  - Oxygen: continue current support with O2 Device: None (Room air) at  ; titrate to keep SpO2 between 90-96%       Diet: NPO for Medical/Clinical Reasons Except for: Ice Chips    DVT Prophylaxis: Enoxaparin (Lovenox) SQ  Jain Catheter: PRESENT, indication: Wound Healing  Central Lines: None  Cardiac Monitoring: ACTIVE order. Indication: QTc prolonging medication (48 hours)  Code Status: Full Code      Disposition Plan   Expected Discharge:  pending  Anticipated discharge location:  Awaiting care coordination huddle  Delays:         The patient's care was discussed with the Bedside Nurse, Patient and Neurology Consultant.  Total time spent 45 min, 30 min were spent on gathering data, speaking with family/consultant, and coordinating care.      Karthik Benton MD  Hospitalist Service  [facility]  Securely message with the Vocera Web Console (learn more here)  Text page via [employer] Paging/Directory       Clinically Significant Risk Factors Present on Admission             # Hypoalbuminemia: Albumin = 1.5 g/dL (Ref range: 3.4 - 5.0 g/dL)  on admission, will monitor as appropriate          ______________________________________________________________________    Interval History   Patient reports continuing cough and profound weakness.  Patient has also been having loose stool about 2 times a day.  She denies fever, chills, dyspnea, chest pain, abdominal pain, dysuria, arthralgia.    Data reviewed today: I reviewed all medications, new labs and imaging results over the last 24 hours. I personally reviewed no images or EKG's today.    Physical Exam   Vital Signs: Temp: 97.7  F (36.5  C) Temp src: Tympanic BP: 117/63 Pulse: 110   Resp: 16 SpO2: 94 % O2 Device: None (Room air)    Weight: 128 lbs 8.45 oz  General Appearance: Elderly woman lying in bed appearing weak and fatigued  Respiratory: Clear to auscultation bilaterally  Cardiovascular: S1-S2, regular rhythm and rate, no murmurs rubs or gallops  GI: Soft, nontender, nondistended  Skin: Intact  Other: Neuro: Cranial nerve exam showing no facial asymmetry.  Motor examination showed generalized weakness but upper extremities strength was actually quite adequate.  Lower extremity weakness was noted especially the proximal muscles and the weakness was worse on the left.  Deep tendon reflexes were 2+ throughout and plantar reflexes were upgoing on the right and downgoing on the left.    Data   Recent Labs   Lab 06/08/22  0535 06/07/22  2312 06/07/22  1650   WBC 9.0  --  11.3*   HGB 11.3*  --  12.7   MCV 95  --  94   *  --  630*   INR  --   --  0.97     --  135   POTASSIUM 3.5 4.1 4.1   CHLORIDE 104  --  104   CO2 23  --  21   BUN 3*  --  3*   CR 0.33* 0.37* 0.43*   ANIONGAP 8  --  10   ROSA 7.4*  --  7.9*   GLC 88  --  116*   ALBUMIN 1.5*  --  1.6*   PROTTOTAL 5.8*  --  6.5*   BILITOTAL 0.5  --  0.6   ALKPHOS 318*  --  351*   ALT 18  --  18   AST 40  --  38   LIPASE  --   --  85     Recent Results (from the past 24 hour(s))   CTA Chest with Contrast    Narrative    CTA CHEST WITH  CONTRAST  6/7/2022 7:46 PM    CLINICAL HISTORY: Female, age 72 years,  PE suspected,  low/intermediate prob, positive D-dimer;    Comparison:  None    TECHNIQUE:  CT was performed of the chest  with IV contrast.   Sagittal, coronal, axial and MIP reconstructions were reviewed.     FINDINGS:  Chest CT:    CT angiogram of the chest: Excellent opacification of the arterial  structures demonstrate no evidence of pulmonary embolus or aortic  dissection. Dense coronary calcifications are seen with small  pericardial effusion.    Moderate size left and small right-sided pleural effusions are present  with near complete atelectasis of the left lower lobe. Mild/moderate  centrilobular emphysema seen throughout both lungs with a predominance  in the upper lobes. There is atelectasis are seen in the dependent  portions of both lungs.    Number of normal-sized nodes are seen throughout the mediastinum and  hilar regions. There appears to mild thickening of the distal  esophagus.      Visualized portions of the upper abdomen demonstrate a small gas  bubble within the liver parenchyma suggesting previous sphincterotomy.  Dense calcifications are seen within the arterial structures in this  fusiform dilatation/aneurysm in the more distal portions of the  visualized abdominal aorta.        Impression    IMPRESSION:   Good quality CTA demonstrates no evidence of acute vascular  abnormality. No evidence of pulmonary embolus or aortic dissection.    Moderate size left and small right-sided pleural effusion with near  complete atelectasis/consolidation of the left lower lobe.    Esophagitis.    Small pericardial effusion.    Small volume of fluid within the left mainstem bronchus and possible  mucous plugging.    Small volume of gas within the liver parenchyma suggesting previous  sphincterotomy.    Portions of the abdominal aorta appears somewhat ectatic. Cannot  exclude aneurysm.    This report is in agreement with the preliminary  report.    ALEXIS KUMAR MD         SYSTEM ID:  [id number]   XR Chest Port 1 View    Narrative    Procedure:XR CHEST PORT 1 VIEW    Clinical history:Female, 72 years, left lower lobe pneumonia and  effusion seen on recent CT scan.    Technique: Single view was obtained.    Comparison: CT scan of the chest 6/7/2022; chest x-ray 10/14/2019    Findings: The cardiac silhouette is normal. The pulmonary vasculature  is normal.    The lungs demonstrate dense consolidation in the left lower lobe with  small/moderate left effusion. Bony structures are unremarkable.      Impression    Impression:   Small/moderate left-sided pleural effusion with dense  consolidation/pneumonia of the left lower lobe.    Small right-sided effusion seen on recent CT is not apparent on this  chest x-ray.    ALEXIS KUMAR MD         SYSTEM ID:  [id number]     Medications     sodium chloride 100 mL/hr at 06/08/22 0713       [Held by provider] amLODIPine  10 mg Oral Daily     [Held by provider] atorvastatin  40 mg Oral QPM     cefTRIAXone  1 g Intravenous Q24H     doxycycline (VIBRAMYCIN) IV  100 mg Intravenous Q12H     enoxaparin ANTICOAGULANT  40 mg Subcutaneous Q24H     nicotine  1 patch Transdermal Daily     nicotine   Transdermal Q8H     [Held by provider] potassium chloride ER  20 mEq Oral BID     sodium chloride (PF)  3 mL Intracatheter Q8H

## 2022-06-08 NOTE — PLAN OF CARE
Pt is A&O x4. VS as charted, afebrile, PRN Tylenol infusion for pain. ST 110s-120 per ICU tele. Report.  IV infusing NS. Repo Q 2 hrs. Jain intact. Remains NPO. Makes needs known, call light in reach.    Face to face report given with opportunity to observe patient.    Report given to Suzy Pardo RN   6/8/2022  8:05 AM

## 2022-06-08 NOTE — PROGRESS NOTES
06/08/22 1700   Signing Clinician's Name / Credentials   Signing clinician's name / credentials Nimisha Bernal dPT, OCS, Cert DN   Quick Adds   Rehab Discipline PT   PT Discharge Planning   PT Discharge Recommendation (DC Rec) Transitional Care Facility   PT Rationale for DC Rec due to current functional status   PT Brief overview of current status min Ax1 for supine to sit, mod Ax2 for sit to stand and unable to ambulate or perform stairs, fatigues easily   Additional Documentation   Rehab Comments see eval   PT Plan Progress stength, functional mobility and activity tolerance   Total Session Time   Total Session Time (minutes) 0 minutes

## 2022-06-09 ENCOUNTER — APPOINTMENT (OUTPATIENT)
Dept: SPEECH THERAPY | Facility: HOSPITAL | Age: 72
DRG: 177 | End: 2022-06-09
Payer: MEDICARE

## 2022-06-09 ENCOUNTER — ANESTHESIA (OUTPATIENT)
Dept: MEDSURG UNIT | Facility: HOSPITAL | Age: 72
DRG: 177 | End: 2022-06-09
Payer: MEDICARE

## 2022-06-09 ENCOUNTER — TELEPHONE (OUTPATIENT)
Dept: NEUROLOGY | Facility: CLINIC | Age: 72
End: 2022-06-09
Payer: MEDICARE

## 2022-06-09 ENCOUNTER — ANESTHESIA EVENT (OUTPATIENT)
Dept: MEDSURG UNIT | Facility: HOSPITAL | Age: 72
DRG: 177 | End: 2022-06-09
Payer: MEDICARE

## 2022-06-09 LAB
ALBUMIN SERPL-MCNC: 1.3 G/DL (ref 3.4–5)
ANION GAP SERPL CALCULATED.3IONS-SCNC: 5 MMOL/L (ref 3–14)
ANION GAP SERPL CALCULATED.3IONS-SCNC: 7 MMOL/L (ref 3–14)
BUN SERPL-MCNC: 1 MG/DL (ref 7–30)
BUN SERPL-MCNC: 1 MG/DL (ref 7–30)
CALCIUM SERPL-MCNC: 7.1 MG/DL (ref 8.5–10.1)
CALCIUM SERPL-MCNC: 7.4 MG/DL (ref 8.5–10.1)
CHLORIDE BLD-SCNC: 105 MMOL/L (ref 94–109)
CHLORIDE BLD-SCNC: 107 MMOL/L (ref 94–109)
CO2 SERPL-SCNC: 24 MMOL/L (ref 20–32)
CO2 SERPL-SCNC: 26 MMOL/L (ref 20–32)
CREAT SERPL-MCNC: 0.28 MG/DL (ref 0.52–1.04)
CREAT SERPL-MCNC: 0.31 MG/DL (ref 0.52–1.04)
ERYTHROCYTE [DISTWIDTH] IN BLOOD BY AUTOMATED COUNT: 14.2 % (ref 10–15)
GFR SERPL CREATININE-BSD FRML MDRD: >90 ML/MIN/1.73M2
GFR SERPL CREATININE-BSD FRML MDRD: >90 ML/MIN/1.73M2
GLUCOSE BLD-MCNC: 78 MG/DL (ref 70–99)
GLUCOSE BLD-MCNC: 84 MG/DL (ref 70–99)
HCT VFR BLD AUTO: 27.1 % (ref 35–47)
HGB BLD-MCNC: 9.4 G/DL (ref 11.7–15.7)
HOLD SPECIMEN: NORMAL
MAGNESIUM SERPL-MCNC: 1.6 MG/DL (ref 1.6–2.3)
MCH RBC QN AUTO: 32 PG (ref 26.5–33)
MCHC RBC AUTO-ENTMCNC: 34.7 G/DL (ref 31.5–36.5)
MCV RBC AUTO: 92 FL (ref 78–100)
PHOSPHATE SERPL-MCNC: 2.9 MG/DL (ref 2.5–4.5)
PLATELET # BLD AUTO: 468 10E3/UL (ref 150–450)
POTASSIUM BLD-SCNC: 2.5 MMOL/L (ref 3.4–5.3)
POTASSIUM BLD-SCNC: 3.3 MMOL/L (ref 3.4–5.3)
POTASSIUM BLD-SCNC: 3.6 MMOL/L (ref 3.4–5.3)
RBC # BLD AUTO: 2.94 10E6/UL (ref 3.8–5.2)
SODIUM SERPL-SCNC: 136 MMOL/L (ref 133–144)
SODIUM SERPL-SCNC: 138 MMOL/L (ref 133–144)
WBC # BLD AUTO: 5.3 10E3/UL (ref 4–11)

## 2022-06-09 PROCEDURE — 250N000013 HC RX MED GY IP 250 OP 250 PS 637: Performed by: INTERNAL MEDICINE

## 2022-06-09 PROCEDURE — 99231 SBSQ HOSP IP/OBS SF/LOW 25: CPT | Performed by: NURSE PRACTITIONER

## 2022-06-09 PROCEDURE — 250N000011 HC RX IP 250 OP 636: Performed by: INTERNAL MEDICINE

## 2022-06-09 PROCEDURE — 36415 COLL VENOUS BLD VENIPUNCTURE: CPT | Performed by: INTERNAL MEDICINE

## 2022-06-09 PROCEDURE — 370N000003 HC ANESTHESIA WARD SERVICE: Performed by: NURSE ANESTHETIST, CERTIFIED REGISTERED

## 2022-06-09 PROCEDURE — 258N000003 HC RX IP 258 OP 636: Performed by: INTERNAL MEDICINE

## 2022-06-09 PROCEDURE — 83735 ASSAY OF MAGNESIUM: CPT | Performed by: INTERNAL MEDICINE

## 2022-06-09 PROCEDURE — 85014 HEMATOCRIT: CPT | Performed by: INTERNAL MEDICINE

## 2022-06-09 PROCEDURE — 99207 PR NO CHARGE LOS: CPT | Performed by: PSYCHIATRY & NEUROLOGY

## 2022-06-09 PROCEDURE — 92526 ORAL FUNCTION THERAPY: CPT | Mod: GN

## 2022-06-09 PROCEDURE — 84132 ASSAY OF SERUM POTASSIUM: CPT | Performed by: INTERNAL MEDICINE

## 2022-06-09 PROCEDURE — 99233 SBSQ HOSP IP/OBS HIGH 50: CPT | Performed by: INTERNAL MEDICINE

## 2022-06-09 PROCEDURE — 120N000001 HC R&B MED SURG/OB

## 2022-06-09 PROCEDURE — 80069 RENAL FUNCTION PANEL: CPT | Performed by: INTERNAL MEDICINE

## 2022-06-09 RX ORDER — POTASSIUM CHLORIDE 1500 MG/1
40 TABLET, EXTENDED RELEASE ORAL ONCE
Status: COMPLETED | OUTPATIENT
Start: 2022-06-09 | End: 2022-06-09

## 2022-06-09 RX ORDER — POTASSIUM CHLORIDE 1500 MG/1
20 TABLET, EXTENDED RELEASE ORAL ONCE
Status: COMPLETED | OUTPATIENT
Start: 2022-06-09 | End: 2022-06-09

## 2022-06-09 RX ORDER — POTASSIUM CHLORIDE 7.45 MG/ML
10 INJECTION INTRAVENOUS
Status: COMPLETED | OUTPATIENT
Start: 2022-06-09 | End: 2022-06-09

## 2022-06-09 RX ADMIN — POTASSIUM CHLORIDE 20 MEQ: 1500 TABLET, EXTENDED RELEASE ORAL at 12:44

## 2022-06-09 RX ADMIN — CLONAZEPAM 0.5 MG: 0.5 TABLET ORAL at 01:50

## 2022-06-09 RX ADMIN — DOXYCYCLINE 100 MG: 100 INJECTION, POWDER, LYOPHILIZED, FOR SOLUTION INTRAVENOUS at 10:20

## 2022-06-09 RX ADMIN — POTASSIUM CHLORIDE 20 MEQ: 750 CAPSULE, EXTENDED RELEASE ORAL at 21:25

## 2022-06-09 RX ADMIN — CLONAZEPAM 0.5 MG: 0.5 TABLET ORAL at 21:25

## 2022-06-09 RX ADMIN — DOXYCYCLINE 100 MG: 100 INJECTION, POWDER, LYOPHILIZED, FOR SOLUTION INTRAVENOUS at 21:25

## 2022-06-09 RX ADMIN — POTASSIUM CHLORIDE 10 MEQ: 7.46 INJECTION, SOLUTION INTRAVENOUS at 09:15

## 2022-06-09 RX ADMIN — POTASSIUM CHLORIDE 40 MEQ: 1500 TABLET, EXTENDED RELEASE ORAL at 07:59

## 2022-06-09 RX ADMIN — ENOXAPARIN SODIUM 40 MG: 40 INJECTION SUBCUTANEOUS at 21:26

## 2022-06-09 RX ADMIN — POTASSIUM CHLORIDE 20 MEQ: 1500 TABLET, EXTENDED RELEASE ORAL at 17:59

## 2022-06-09 RX ADMIN — CEFTRIAXONE SODIUM 1 G: 1 INJECTION, SOLUTION INTRAVENOUS at 01:42

## 2022-06-09 RX ADMIN — POTASSIUM CHLORIDE 10 MEQ: 7.46 INJECTION, SOLUTION INTRAVENOUS at 08:14

## 2022-06-09 ASSESSMENT — ACTIVITIES OF DAILY LIVING (ADL)
ADLS_ACUITY_SCORE: 38
ADLS_ACUITY_SCORE: 36
ADLS_ACUITY_SCORE: 38
ADLS_ACUITY_SCORE: 36
ADLS_ACUITY_SCORE: 38

## 2022-06-09 NOTE — ANESTHESIA POSTPROCEDURE EVALUATION
Patient: Raquel Sanchez    Procedure: * No procedures listed *       Anesthesia Type:  No value filed.    Note:  Disposition: Inpatient   Postop Pain Control:    PONV:    Neuro/Psych:    Airway/Respiratory:    CV/Hemodynamics:    Other NRE:    DID A NON-ROUTINE EVENT OCCUR?            Last vitals:  Vitals:    06/09/22 1624 06/09/22 1625 06/09/22 1626   BP:      Pulse: 101 96 96   Resp:      Temp:      SpO2: 95% 98% 98%       Electronically Signed By: OLGA Worthington CRNA  June 9, 2022  4:56 PM

## 2022-06-09 NOTE — PROGRESS NOTES
INPATIENT ROUNDING NOTE  6/9/2022    Patient: Raquel Sanchez    Physician of Record: Hospitalist Service    Admitting diagnosis: Weakness [R53.1]  Esophagitis [K20.90]  Pleural effusion [J90]  Generalized muscle weakness [M62.81]  Failure to thrive in adult [R62.7]  Decubitus ulcer of buttock [L89.309]  COVID-19 [U07.1]    Length of stay: 2    Patient is seen for wound care.     CURRENT MEDICATIONS:  Continuous Medications:  Current Facility-Administered Medications   Medication Last Rate     sodium chloride 50 mL/hr at 06/08/22 3024       Scheduled Medications:  Current Facility-Administered Medications   Medication Dose Route Frequency     [Held by provider] amLODIPine  10 mg Oral Daily     [Held by provider] atorvastatin  40 mg Oral QPM     cefTRIAXone  1 g Intravenous Q24H     doxycycline (VIBRAMYCIN) IV  100 mg Intravenous Q12H     enoxaparin ANTICOAGULANT  40 mg Subcutaneous Q24H     nicotine  1 patch Transdermal Daily     nicotine   Transdermal Q8H     [Held by provider] potassium chloride ER  20 mEq Oral BID     sodium chloride (PF)  3 mL Intracatheter Q8H     sodium chloride (PF)  5 mL Intravenous Q8H       PRN Medications:  Current Facility-Administered Medications   Medication Dose Route Frequency     acetaminophen  650 mg Rectal Q6H PRN     [Held by provider] acetaminophen  325 mg Oral Q6H PRN     clonazePAM  0.5 mg Oral BID PRN     lidocaine 4%   Topical Q1H PRN     lidocaine (buffered or not buffered)  0.1-1 mL Other Q1H PRN     LORazepam  0.5 mg Intravenous Q4H PRN     prochlorperazine  5 mg Intravenous Q6H PRN    Or     prochlorperazine  5 mg Oral Q6H PRN    Or     prochlorperazine  12.5 mg Rectal Q12H PRN     sodium chloride (PF)  3 mL Intracatheter q1 min prn       SUBJECTIVE:   Nausea: No. Vomiting: No. Pain control: good.    PHYSICAL EXAM:   Vital signs: /71 (BP Location: Left arm, Patient Position: Semi-Gaston's)   Pulse 110   Temp 97.7  F (36.5  C) (Tympanic)   Resp 18   Ht 1.575 m  "(5' 2\")   Wt 58.6 kg (129 lb 3 oz)   SpO2 98%   BMI 23.63 kg/m     BMI: Body mass index is 23.63 kg/m .   General: Normal, healthy, cooperative, in no acute distress, alert   Lungs: respirations are non-labored   Buttock dermatitis and scabbing is noted.  This is significantly improved since yesterday   Right antecubital arm: bruising without open wound noted this is improved since yesterday   Extremities: No cyanosis, clubbing or edema noted bilaterally in Upper and Lower Extremities   Neurological: without deficit    INPUT/OUTPUT:      Intake/Output Summary (Last 24 hours) at 6/9/2022 1441  Last data filed at 6/9/2022 1400  Gross per 24 hour   Intake 1703 ml   Output 1675 ml   Net 28 ml       I/O last 3 completed shifts:  In: 1583 [P.O.:240; I.V.:1343]  Out: 1100 [Urine:1100]    LABS:    Last CBC Rrsults:   Recent Labs   Lab Test 06/09/22  0726 06/08/22  0535 06/07/22  1650   WBC 5.3 9.0 11.3*   RBC 2.94* 3.52* 3.98   HGB 9.4* 11.3* 12.7   HCT 27.1* 33.5* 37.3   MCV 92 95 94   MCH 32.0 32.1 31.9   MCHC 34.7 33.7 34.0   RDW 14.2 14.2 14.3   * 575* 630*       Last Comprehensive Metabolic panel:  Recent Labs   Lab Test 06/09/22  1103 06/09/22  0726 06/08/22  0535 06/07/22  2312 06/07/22  1650 05/12/22  0919 11/15/21  1101    138 135  --  135   < > 138   POTASSIUM 3.3* 2.5* 3.5   < > 4.1   < > 3.8   CHLORIDE 105 107 104  --  104   < > 106   CO2 26 24 23  --  21   < > 25   ANIONGAP 5 7 8  --  10   < > 7   GLC 84 78 88  --  116*   < > 93   BUN 1* 1* 3*  --  3*   < > 4*   CR 0.31* 0.28* 0.33*   < > 0.43*   < > 0.60   GFRESTIMATED >90 >90 >90   < > >90   < > >90   ROSA 7.4* 7.1* 7.4*  --  7.9*   < > 8.9   BILITOTAL  --   --  0.5  --  0.6  --  0.3   ALKPHOS  --   --  318*  --  351*  --  60   ALT  --   --  18  --  18  --  10   AST  --   --  40  --  38  --  13    < > = values in this interval not displayed.       Recent Labs   Lab Test 06/09/22  0726 06/08/22  0535 06/07/22  2312 06/07/22  1650   MAG 1.6 2.0 " 1.7 1.7   ALBUMIN 1.3* 1.5*  --  1.6*   PHOS 2.9  --   --   --        ASSESSMENT:    72 year old female admitted for Weakness [R53.1]  Esophagitis [K20.90]  Pleural effusion [J90]  Generalized muscle weakness [M62.81]  Failure to thrive in adult [R62.7]  Decubitus ulcer of buttock [L89.309]  COVID-19 [U07.1]. Hospital day 2.  Seen for wound care.    PLAN:   Continue current wound care treatment plan

## 2022-06-09 NOTE — PLAN OF CARE
"Goal Outcome Evaluation: Unchanged   Reason for hospital stay:  Weakness, COVID positive   Living situation PTA: Lives at home by herself.   Most recent vitals: /71 (BP Location: Left arm, Patient Position: Supine, Cuff Size: Adult Regular)   Pulse 110   Temp 98.4  F (36.9  C) (Tympanic)   Resp 18   Ht 1.575 m (5' 2\")   Wt 58.3 kg (128 lb 8.5 oz)   SpO2 94%   BMI 23.51 kg/m      Pain Management:  Pt denies having pain.   LOC:  Alert and orientated to person, place, time, and situation.   Cardiac:  Tachycardic. On telemetry.  Prolonged QT   Respiratory:  All fields diminished. Congested, non-productive cough. O2: NC 1 LPM. Sats in the mid 90's.   GI:  All quadrants audible and normoactive.   :  Urethral catheter. Cath cares were done.   Skin Issues:  Skin is warm, jennifer, and dry. Abrasions and excoriation buttock/IT/sacrum/coccyx. Redness perineum. Skin barrier is liberally applied.     IVF:  NS 50 mL/hr. IV infusing.   ABX:  IV Doxycycline, IV Rocephin     Nutrition: Adequate. Pureed diet.   Ambulation: To be determined. She did not get out of bed. Weakness is reported by pt.   Safety:  Bed in the low position, call light within reach, ID band in place, personal items within reach, use of call light appropriately, and makes needs known.     Comments: BP's are stable. Afebrile. Tachycardic and MD is aware she is tachycardic.Face to face report given with opportunity to observe patient.    Report given to Alexandra DENNIS RN.     Ester Cuevas RN   6/8/2022  11:51 PM                       "

## 2022-06-09 NOTE — PLAN OF CARE
"/71 (BP Location: Left arm, Patient Position: Semi-Gaston's)   Pulse 110   Temp 97.7  F (36.5  C) (Tympanic)   Resp 18   Ht 1.575 m (5' 2\")   Wt 58.6 kg (129 lb 3 oz)   SpO2 98%   BMI 23.63 kg/m      Pt A&O, VSS, afebrile, and denies pain. On room air. Lung sounds are diminished. Per ICU report, telemetry: SR 90s with prolonged QT. Jain cath remains in place to promote wound healing, output as charted. Turned Q2hrs and barrier cream applied to bottom. Rest of assessment as charted. IV infusing NS @ 100 mL/hr. Call light within reach and bed alarm on.    Face to face report given with opportunity to observe patient.    Report given to LON Rosen RN   6/9/2022  "

## 2022-06-09 NOTE — PROGRESS NOTES
06/09/22 1400   Appointment Canceled   Appointment Canceled Patient declined   Cancel Comments Pt reports that she is too fatigued. Asked us to come back in AM.   Signing Clinician's Name / Credentials   Signing clinician's name / credentials Ana Lombardo DPT   Quick Adds   Rehab Discipline PT

## 2022-06-09 NOTE — PLAN OF CARE
Speech Language Therapy Discharge Summary    Reason for therapy discharge:    All goals and outcomes met, no further needs identified.    Progress towards therapy goal(s). See goals on Care Plan in Epic electronic health record for goal details.  Goals met    Therapy recommendation(s):    No further therapy is recommended. Patient is tolerating IDDSI 4 (pureed) and thin liquids which she reports is her baseline diet.

## 2022-06-09 NOTE — PROVIDER NOTIFICATION
DATE:  6/9/2022   TIME OF RECEIPT FROM LAB:  0747  LAB TEST:  potassium  LAB VALUE:  2.5  RESULTS GIVEN WITH READ-BACK TO (PROVIDER):  Karthik Benton MD  TIME LAB VALUE REPORTED TO PROVIDER:   0748    Updated primary nurse, Suzy as well.

## 2022-06-09 NOTE — PROGRESS NOTES
Writer again attempted multiple times to call either patient or patient's daughter to complete CM assessment. No answer by either patient or daughter. Will continue to attempt to complete assessment again tomorrow.

## 2022-06-09 NOTE — PROGRESS NOTES
[facility]    Medicine Progress Note - Hospitalist Service    Date of Admission:  6/7/2022    Assessment & Plan          Raquel Sanchez is a 72 year old female admitted on 6/7/2022. She presents from home because of weakness and inability to take care of herself or even perform simplest tasks. Admitted to treat acute problems (see below) and NH placement.     Hospital problems:   1.  Lower extremities muscle weakness.    It appears that the patient's weakness is a much more profound in the lower extremities.  Particularly left lower extremity.  In light of this, we will further investigate this with MRI of the lumbar spine as well as the brain.  I spoke with Dr. Robbins, neurologist at [facility], who noted that patient having more weakness on the left side but Babinski on the right may indicate higher level of involvement; thus, imaging the whole spine is indicated.  MRI of the cervical, thoracic, and lumbar spine have been ordered.  - June 9: Patient's MRI of the brain, cervical and thoracic spine did not show any acute findings.  Lumbar spine MRI showed atrophy of the iliac muscles as well as of the paraspinous muscles, gluteus tyson and minus.  It also showed degenerative anterolisthesis of L4 relative to L5 with severe bilateral facet joint degenerative changes mild narrowing of the central canal and moderately narrowing the lateral recess with a slight mass-effect upon the intrathecal and exiting L5 nerve roots worse on the left neuroforamen layering is similar in appearance with the bilateral ganglionic abutment worse on the left.  I spoke with Dr. David, neurologist at [facility], who noted that there was no acute findings and recommended consulting neurosurgery.    2.  Confirmed COVID-19 infection.    Patient reports significant coughing but no respiratory distress is reported and she is not requiring any supplemental oxygen.  We will just continue to monitor and treat  patient's symptoms.  -June 9: Patient's respiratory status is stable on 1 L/min nasal cannula oxygen.    3.  Abnormal chest CT with left effusion.    Fluid and debris's dependent portion of the left mainstem bronchus with thickening of the lingular bronchus and occlusion of the left lower lobe bronchus left hilar or endobronchial mass not excluded.  Patient may need bronchoscopy in the future.    4. Long QT history with VT.   Episode happened April 22nd 2022.  she had also low potassium and magnesium.   She is not on any antiarrhythmics.  Echo April 22 showed good ejection fraction.  She had stress test after that and followed with the cardiology.  She has scheduled appointment with cardiology 6/10/2022.  Avoid QT prolonging medications and continue on telemetry.    5.  Elevated D-dimer she has no PE.    Will use only DVT prophylaxis.    6.  UTI   IV ceftriaxone.  Patient's urine culture is growing gram-negative rods.  Identification and sensitivity are pending    7.  Protein calorie malnutrition.    We will get dietary consult.    8.  Tobacco and alcohol abuse.    We will give nicotine patch.  She said alcohol last time she was able to drink was a month ago.    9.  Possible aspiration.    Patient was evaluated by speech therapy and IDD SI 4 puréed diet and thin liquids were recommended    10.  Pressure ulcer stage I  Surgical wound care consult appreciated.  Treating the buttock with barrier ointment and frequent repositioning of the area for pressure relief.          Diet: Pureed Diet (level 4) Thin Liquids (level 0)    DVT Prophylaxis: Enoxaparin (Lovenox) SQ  Jain Catheter: PRESENT, indication: Wound Healing  Central Lines: None  Cardiac Monitoring: ACTIVE order. Indication: QTc prolonging medication (48 hours)  Code Status: Full Code      Disposition Plan   Expected Discharge:  pending  Anticipated discharge location:  Awaiting care coordination huddle  Delays:         The patient's care was discussed with the  Bedside Nurse, Patient and Neurology Consultant.  Total time spent 45 min, 30 min were spent on gathering data, speaking with family/consultant, and coordinating care.      Karthik Benton MD  Hospitalist Service  [facility]  Securely message with the Vocera Web Console (learn more here)  Text page via [facility] Paging/Directory       Clinically Significant Risk Factors Present on Admission              # Severe Malnutrition: based on nutrition assessment     ______________________________________________________________________    Interval History   Patient is continuing to complain of profound weakness.  MRI of the lumbar spine showed anterolisthesis with spinal stenosis    She denies fever, chills, dyspnea, chest pain, abdominal pain, dysuria, arthralgia.    Data reviewed today: I reviewed all medications, new labs and imaging results over the last 24 hours. I personally reviewed no images or EKG's today.    Physical Exam   Vital Signs: Temp: 98.6  F (37  C) Temp src: Tympanic BP: 104/61 Pulse: 96   Resp: 18 SpO2: 98 % O2 Device: None (Room air) Oxygen Delivery: 1 LPM (removed O2)  Weight: 129 lbs 3.03 oz  General Appearance: Elderly woman lying in bed appearing weak and fatigued  Respiratory: Clear to auscultation bilaterally  Cardiovascular: S1-S2, regular rhythm and rate, no murmurs rubs or gallops  GI: Soft, nontender, nondistended  Skin: Intact  Other: Neuro: Cranial nerve exam showing no facial asymmetry.  Motor examination showed generalized weakness but upper extremities strength was actually quite adequate.  Lower extremity weakness was noted especially the proximal muscles and the weakness was worse on the left.  Deep tendon reflexes were 2+ throughout and plantar reflexes were upgoing on the right and downgoing on the left.    Data   Recent Labs   Lab 06/09/22  1646 06/09/22  1103 06/09/22  0726 06/08/22  0535 06/07/22  2312 06/07/22  1650   WBC  --   --  5.3 9.0  --  11.3*   HGB  --   --  9.4*  11.3*  --  12.7   MCV  --   --  92 95  --  94   PLT  --   --  468* 575*  --  630*   INR  --   --   --   --   --  0.97   NA  --  136 138 135  --  135   POTASSIUM 3.6 3.3* 2.5* 3.5   < > 4.1   CHLORIDE  --  105 107 104  --  104   CO2  --  26 24 23  --  21   BUN  --  1* 1* 3*  --  3*   CR  --  0.31* 0.28* 0.33*   < > 0.43*   ANIONGAP  --  5 7 8  --  10   ROSA  --  7.4* 7.1* 7.4*  --  7.9*   GLC  --  84 78 88  --  116*   ALBUMIN  --   --  1.3* 1.5*  --  1.6*   PROTTOTAL  --   --   --  5.8*  --  6.5*   BILITOTAL  --   --   --  0.5  --  0.6   ALKPHOS  --   --   --  318*  --  351*   ALT  --   --   --  18  --  18   AST  --   --   --  40  --  38   LIPASE  --   --   --   --   --  85    < > = values in this interval not displayed.     Recent Results (from the past 24 hour(s))   MR Cervical Spine w/o Contrast    Narrative    MR CERVICAL SPINE W/O CONTRAST    HISTORY: Lower extremity muscle weakness. Myelopathy.    TECHNIQUE: Sagittal T1, T2 and STIR as well as axial T2 gradient and  3-D T2 images of the cervical spine were obtained.    COMPARISON: MR brain 6/8/2022    FINDINGS:      The cervical lordosis is preserved. The vertebral body heights are  maintained. No marrow edema is present. No suspicious marrow lesion is  identified. The craniocervical junction is intact.    The cervical cord has a normal caliber. There are no areas of abnormal  cord signal. T2 hyperintense signal is redemonstrated within the tobias  No masses or fluid collections are seen in the spinal canal or  paravertebral soft tissues.      Degenerative changes are mostly mild, with disc bulging as well as  uncovertebral and facet degenerative hypertrophy resulting in mild  left foraminal stenoses at C5-6 and C6-7.      Impression    IMPRESSION:    Allowing for mild motion artifact, no evidence of abnormal cervical  cord signal. No critical spinal stenosis or cord compression.    Pontine T2 hyperintensity is redemonstrated, also seen on the MR brain  and most  likely reflecting advanced microvascular disease.    MARK RANDHAWA MD         SYSTEM ID:  [id number]   MR Thoracic Spine w/o Contrast    Narrative    PROCEDURE: MR THORACIC SPINE W/O CONTRAST, 6/8/2022 8:33 PM     HISTORY:Female, age 72 years, Mid-back pain    COMPARISON: CT scan of the chest 6/7/2022    TECHNIQUE:    FINDINGS:  Thoracic spine demonstrates normal kyphotic curvature. Thoracic spinal  cord is normal.    Thoracic vertebral bodies are intact. There are mild degenerative  changes seen throughout the thoracic spine without evidence of central  canal or foraminal narrowing.    Visualized portions of the rib cage demonstrate no evidence of an  apparent fracture.   Moderate size left and small right-sided effusion is similar in  appearance compared to the recent CT scan with persistent atelectasis  and consolidation of the left lower lobe.    Paraspinous muscles are mildly atrophic.      Impression    IMPRESSION:   No evidence of acute abnormality. Mild degenerative changes of the  thoracic spine.    Stable appearance of bilateral pleural effusions and  atelectasis/consolidation of the left lower lobe.     ALEXIS KUMAR MD         SYSTEM ID:  [id number]   MR Lumbar Spine w/o Contrast    Narrative    PROCEDURE: MR LUMBAR SPINE W/O CONTRAST, 6/8/2022 8:34 PM     HISTORY:Female, age 72 years, Low back pain, > 6 wks    COMPARISON: MR lumbar spine 12/29/2021    TECHNIQUE: Sagittal T1, proton density, T2 with fat saturation; axial  proton density and T2.    FINDINGS:  L4 is anteriorly subluxed approximately 4 mm in relation to L5,  similar when compared to prior study.    Mild disc and endplate degenerative changes are again seen at T11-T12.  The T12-L1 disc is normal. Facet joints and endplates at T12-L1 are  also normal.    L1-2: Mild disc degeneration with slight bulge, mild endplate and  facet joint degenerative changes are similar in appearance.    L2-3: Mild disc desiccation and slight bulge, mild  endplate and facet  joint degenerative changes are similar in appearance.    L3-4: Mild disc degeneration, mild bulge, endplate and facet joint  degenerative changes are similar in appearance with persistent right  L3 ganglionic encroachment/abutment.    L4-5: Mild disc height loss. Degenerative anterolisthesis of L4  relative to L5 with severe bilateral facet joint degenerative change.  Anterolisthesis results in uncovering of the posterior margins of the  disc, mildly narrowing the central canal and moderately narrowing the  lateral recesses with slight mass effect upon the intrathecal and  exiting L5 nerve roots, worse on the left. Neuroforamina layering is  similar in appearance with bilateral ganglionic abutment, worse on the  left.    L5-S1: Mild disc degeneration. Mild endplate and mild/moderate  bilateral facet joint degenerative changes are similar.    SI joints are congruent. No evidence of sacral fracture.    There is been progressive atrophy of the iliac is muscles. Moderate  atrophy of the paraspinous muscles and moderate to severe atrophy of  the psoas muscles does not appear to be significantly different.  Moderate atrophy the gluteus tyosn and minimus muscles also  unchanged.      Impression    IMPRESSION:   No acute abnormality.    Multilevel degenerative change is similar in appearance dating back to  12/29/2021 with foraminal narrowing and lateral recess as described  above.    ALEXIS KUMAR MD         SYSTEM ID:  [id number]   MR Brain w/o & w Contrast    Narrative    MR BRAIN W/O & W CONTRAST  6/8/2022 8:51 PM    History: Female, age 72 years, Neuro deficit, acute, stroke suspected    Comparison: None.    Technique: Sagittal T1, axial T2, T2 FLAIR, diffusion, gradient echo,  ADC mapping, T1 and 3 plane T1 fat saturation postcontrast 3-D. .    Findings:   Ventricles and sulci: Normal in size and shape.    Gray and white matter: Scattered, nonenhancing white matter lesions  suggesting small  vessel disease.    Cerebellopontine angles: Clear    Extra-axial spaces: Clear.    Enhancement: Normal.    Midline structures: Normal in contour.  Globes: Normal.  Orbits: Normal. Intraconal and extraconal fat unremarkable.  Extraocular muscles: Normal.  Paranasal sinuses: Normal.  Mastoid air cells: Normal.  Diffusion: Normal.      Impression    Impression:  Scattered, nonspecific white matter changes suggest small vessel  disease. There is no evidence of acute or subacute ischemia.      ALEXIS KUMAR MD         SYSTEM ID:  [id number]     Medications     sodium chloride 50 mL/hr at 06/08/22 9987       [Held by provider] amLODIPine  10 mg Oral Daily     [Held by provider] atorvastatin  40 mg Oral QPM     cefTRIAXone  1 g Intravenous Q24H     doxycycline (VIBRAMYCIN) IV  100 mg Intravenous Q12H     enoxaparin ANTICOAGULANT  40 mg Subcutaneous Q24H     nicotine  1 patch Transdermal Daily     nicotine   Transdermal Q8H     potassium chloride  20 mEq Oral Once     potassium chloride ER  20 mEq Oral BID     sodium chloride (PF)  3 mL Intracatheter Q8H     sodium chloride (PF)  5 mL Intravenous Q8H

## 2022-06-09 NOTE — TELEPHONE ENCOUNTER
Called by hospitalist.  MRI reviewed.  She has significant degenerative changes in the lumbar spine bilaterally but does not appear obviously worse than previous lumbar imaging.  Would discuss with neurogsurgery imaging findings as I suspect she may need surgery eventually, but no clear changes that I am seeing on imaging to suggest urgent neurosurgery.  If develops more progressive weakness/bowel/bladder changes should call back neurology and neurosurgery.      Josue Robbins, DO

## 2022-06-09 NOTE — PROGRESS NOTES
06/09/22 1200   Signing Clinician's Name / Credentials   Signing clinician's name / credentials Nasra Farnsworth MS CCC-SLP   Quick Adds   Quick Adds Interventions Speech Language Pathology   SLP - Dysphagia/Swallow   Minutes of Treatment 15 minutes   Symptoms Noted During/After Treatment None   Treatment Detail Patient seen this AM for skilled PO trials. Patient tolerated 3 oz. of pureed textures and 4 oz. of thin liquids without demonstrating any overt signs/symptoms of aspiration. Patient declined trialing advanced trials due to difficulties with mastication on textures more advanced than pureed. She reported that at home she did not eat any textures above pureed. Discussed how current recommendations would be for all solids to come pureed and patient is in agreement with this plan. Recommend continuation of pureed and thin liquids. Patient was provided education on compensatory strategies for safe swallowing including the following; sitting at 90 degrees, taking small bites/sips, and taking small single sips.   SLP Discharge Planning   SLP Discharge Recommendation   (Defer to other care team members)   SLP Rationale for DC Rec Patient would likely require assistance with meal preparation and set up, otherwise she is independent at mealtimes. Patient requires a modified diet of IDDSI 4 (pureed) and thin liquids however she reports that this has been her diet at home.   SLP Brief overview of current status  Continue to recommend IDDSI 4 (pureed) and thin liquids. This is patients reported baseline diet and she declined trialing any other advanced textures. Patient expressed verbal agreement to all her meals coming pureed.  Aspiration precautions should be followed due to patients overall weakness.    Additional Documentation   SLP Plan Plan to complete orders. Continued skilled services are not recommended due to patient tolerating her reported baseline diet. If patient begins to have increased difficulty with  swallowing new orders should be placed for SLP to re-assess.   Total Session Time   Total Session Time (minutes) 15 minutes       Recommendations for Feeding:  - Patient requires distant supervision with eating and drinking  - Patient must sit in the chair at 90 degrees (may require pillows behind back)  - Eliminate all distractions; turn off the TV  - Patient should only eat/drink when they are fully alert  - Encourage small bites and small sips  - Alternate between a bite of food and a sip of liquid  - Check for oral pocketing  - Swallow bite of food/liquid before offering another bite/sip   - Patient must remain upright in chair at least 30-45 minutes after oral intake    NOTE: If patient becomes too tired, there are noted changes in their vocal quality/breathing (wet and gurgly), or they begin coughing frequently, stop feeding immediately and complete oral cares. SLP to re-assess swallowing

## 2022-06-09 NOTE — ANESTHESIA PREPROCEDURE EVALUATION
Anesthesia Pre-Procedure Evaluation    Patient: Raquel Sanchez   MRN: [id number] : 1950        Procedure : * No procedures listed *          History reviewed. No pertinent past medical history.   Past Surgical History:   Procedure Laterality Date     GYN SURGERY      hysterectomy     IR CONSULTATION FOR IR EXAM  2022      Allergies   Allergen Reactions     Tape [Adhesive Tape] Blisters     Seasonal Allergies      Poison Ivy Extract [Extract Of Poison Riddhi] Rash      Social History     Tobacco Use     Smoking status: Current Every Day Smoker     Packs/day: 1.00     Years: 55.00     Pack years: 55.00     Types: Cigarettes     Start date: 1966     Smokeless tobacco: Never Used   Substance Use Topics     Alcohol use: Yes     Alcohol/week: 0.0 standard drinks     Comment: occasional      Wt Readings from Last 1 Encounters:   22 58.6 kg (129 lb 3 oz)              OUTSIDE LABS:  CBC:   Lab Results   Component Value Date    WBC 5.3 2022    WBC 9.0 2022    HGB 9.4 (L) 2022    HGB 11.3 (L) 2022    HCT 27.1 (L) 2022    HCT 33.5 (L) 2022     (H) 2022     (H) 2022     BMP:   Lab Results   Component Value Date     2022     2022    POTASSIUM 3.3 (L) 2022    POTASSIUM 2.5 (LL) 2022    CHLORIDE 105 2022    CHLORIDE 107 2022    CO2 26 2022    CO2 24 2022    BUN 1 (L) 2022    BUN 1 (L) 2022    CR 0.31 (L) 2022    CR 0.28 (L) 2022    GLC 84 2022    GLC 78 2022     COAGS:   Lab Results   Component Value Date    INR 0.97 2022     POC: No results found for: BGM, HCG, HCGS  HEPATIC:   Lab Results   Component Value Date    ALBUMIN 1.3 (L) 2022    PROTTOTAL 5.8 (L) 2022    ALT 18 2022    AST 40 2022    ALKPHOS 318 (H) 2022    BILITOTAL 0.5 2022     OTHER:   Lab Results   Component Value Date    LACT 1.5 2022    ROSA 7.4  (L) 06/09/2022    PHOS 2.9 06/09/2022    MAG 1.6 06/09/2022    LIPASE 85 06/07/2022    TSH 0.97 06/07/2022    CRP 16.9 (H) 06/08/2022       Anesthesia Plan       - Procedure: Procedure only, no anesthetic delivered                    Consents            Postoperative Care            Comments:                OLGA Worthington CRNA

## 2022-06-09 NOTE — PHARMACY-MEDICATION REGIMEN REVIEW
Pharmacy Antimicrobial Stewardship Assessment     Current Antimicrobial Therapy:  Anti-infectives (From now, onward)    Start     Dose/Rate Route Frequency Ordered Stop    22  cefTRIAXone in d5w (ROCEPHIN) intermittent infusion 1 g         1 g  over 30 Minutes Intravenous EVERY 24 HOURS 22  doxycycline (VIBRAMYCIN) 100 mg in sodium chloride 0.9 % 100 mL intermittent infusion         100 mg  over 1-2 Hours Intravenous EVERY 12 HOURS 22            Indication: UTI/CAP    Days of Therapy: 2     Pertinent Labs:  Recent Labs   Lab Test 22  0722  0535 22  1650   WBC 5.3 9.0 11.3*     Recent Labs   Lab Test 22  0535 22  1650 10/14/19  2240   LACT  --  1.5 3.0*   CRP 16.9* 16.4*  --    PCAL  --  0.26*  --         Temperature:  Temp (24hrs), Av.8  F (36.6  C), Min:97.1  F (36.2  C), Max:98.4  F (36.9  C)    Culture Results:          Recommendations/Interventions:  No recommendations at this time. Will continue to monitor.     Rowena Elliott RPH  2022

## 2022-06-09 NOTE — PLAN OF CARE
Goal Outcome Evaluation:    Pt is alert, orientated this shift to self, place, and situation. Pt denies pain this shift.VSS. Pt remains on 1 liter per nasal cannula. Frequent congested nonproductive cough this shift.  Peripheral IV in R hand patient complaints of pain. New peripheral IV is started in L forearm.   Jain catheter remains in place, adequate yellow output this shift.   Incontinence pad appears wet x 1 this shift, although catheter later reassessed and appears draining. Scattered scabbing and redness to coccyx. Barrier cream applied to coccyx and buttocks. +2 Edema present to lower extremities. Patient encouraged to elevate heels.   Pt is extensive assist of 2 while repositioning this shift.       Face to face end of shift report communicated to uSzy DELGADO RN.    Alexandra Preciado RN  6/9/2022  6:59 AM

## 2022-06-10 LAB
ALBUMIN SERPL-MCNC: 1.2 G/DL (ref 3.4–5)
ANION GAP SERPL CALCULATED.3IONS-SCNC: 5 MMOL/L (ref 3–14)
BUN SERPL-MCNC: 2 MG/DL (ref 7–30)
CALCIUM SERPL-MCNC: 7.5 MG/DL (ref 8.5–10.1)
CHLORIDE BLD-SCNC: 113 MMOL/L (ref 94–109)
CO2 SERPL-SCNC: 23 MMOL/L (ref 20–32)
CREAT SERPL-MCNC: 0.3 MG/DL (ref 0.52–1.04)
ERYTHROCYTE [DISTWIDTH] IN BLOOD BY AUTOMATED COUNT: 15.4 % (ref 10–15)
GFR SERPL CREATININE-BSD FRML MDRD: >90 ML/MIN/1.73M2
GLUCOSE BLD-MCNC: 78 MG/DL (ref 70–99)
HCT VFR BLD AUTO: 31.7 % (ref 35–47)
HGB BLD-MCNC: 9.8 G/DL (ref 11.7–15.7)
MAGNESIUM SERPL-MCNC: 1.6 MG/DL (ref 1.6–2.3)
MCH RBC QN AUTO: 31.6 PG (ref 26.5–33)
MCHC RBC AUTO-ENTMCNC: 30.9 G/DL (ref 31.5–36.5)
MCV RBC AUTO: 102 FL (ref 78–100)
PHOSPHATE SERPL-MCNC: 2.7 MG/DL (ref 2.5–4.5)
PLATELET # BLD AUTO: 442 10E3/UL (ref 150–450)
POTASSIUM BLD-SCNC: 4.3 MMOL/L (ref 3.4–5.3)
RBC # BLD AUTO: 3.1 10E6/UL (ref 3.8–5.2)
SODIUM SERPL-SCNC: 141 MMOL/L (ref 133–144)
WBC # BLD AUTO: 10.2 10E3/UL (ref 4–11)

## 2022-06-10 PROCEDURE — 85027 COMPLETE CBC AUTOMATED: CPT | Performed by: INTERNAL MEDICINE

## 2022-06-10 PROCEDURE — 80069 RENAL FUNCTION PANEL: CPT | Performed by: INTERNAL MEDICINE

## 2022-06-10 PROCEDURE — 250N000013 HC RX MED GY IP 250 OP 250 PS 637: Performed by: INTERNAL MEDICINE

## 2022-06-10 PROCEDURE — 250N000011 HC RX IP 250 OP 636: Performed by: INTERNAL MEDICINE

## 2022-06-10 PROCEDURE — 258N000003 HC RX IP 258 OP 636: Performed by: INTERNAL MEDICINE

## 2022-06-10 PROCEDURE — 120N000001 HC R&B MED SURG/OB

## 2022-06-10 PROCEDURE — 36416 COLLJ CAPILLARY BLOOD SPEC: CPT | Performed by: INTERNAL MEDICINE

## 2022-06-10 PROCEDURE — 83735 ASSAY OF MAGNESIUM: CPT | Performed by: INTERNAL MEDICINE

## 2022-06-10 PROCEDURE — 99232 SBSQ HOSP IP/OBS MODERATE 35: CPT | Performed by: INTERNAL MEDICINE

## 2022-06-10 RX ADMIN — CEFTRIAXONE SODIUM 1 G: 1 INJECTION, SOLUTION INTRAVENOUS at 01:11

## 2022-06-10 RX ADMIN — CLONAZEPAM 0.5 MG: 0.5 TABLET ORAL at 22:05

## 2022-06-10 RX ADMIN — ENOXAPARIN SODIUM 40 MG: 40 INJECTION SUBCUTANEOUS at 22:05

## 2022-06-10 RX ADMIN — POTASSIUM CHLORIDE 20 MEQ: 750 CAPSULE, EXTENDED RELEASE ORAL at 10:25

## 2022-06-10 RX ADMIN — POTASSIUM CHLORIDE 20 MEQ: 750 CAPSULE, EXTENDED RELEASE ORAL at 20:28

## 2022-06-10 RX ADMIN — DOXYCYCLINE 100 MG: 100 INJECTION, POWDER, LYOPHILIZED, FOR SOLUTION INTRAVENOUS at 09:06

## 2022-06-10 RX ADMIN — SODIUM CHLORIDE: 9 INJECTION, SOLUTION INTRAVENOUS at 04:00

## 2022-06-10 ASSESSMENT — ACTIVITIES OF DAILY LIVING (ADL)
ADLS_ACUITY_SCORE: 38

## 2022-06-10 NOTE — PHARMACY-MEDICATION REGIMEN REVIEW
Pharmacy Antimicrobial Stewardship Assessment     Current Antimicrobial Therapy:  Anti-infectives (From now, onward)    Start     Dose/Rate Route Frequency Ordered Stop    22  cefTRIAXone in d5w (ROCEPHIN) intermittent infusion 1 g         1 g  over 30 Minutes Intravenous EVERY 24 HOURS 22  doxycycline (VIBRAMYCIN) 100 mg in sodium chloride 0.9 % 100 mL intermittent infusion         100 mg  over 1-2 Hours Intravenous EVERY 12 HOURS 22            Indication: UTI/CAP     Days of Therapy: 3     Pertinent Labs:  Recent Labs   Lab Test 06/10/22  0625 22  0726 22  0535   WBC 10.2 5.3 9.0     Recent Labs   Lab Test 22  0535 22  1650 10/14/19  2240   LACT  --  1.5 3.0*   CRP 16.9* 16.4*  --    PCAL  --  0.26*  --         Temperature:  Temp (24hrs), Av.8  F (36.6  C), Min:96.8  F (36  C), Max:98.6  F (37  C)    Culture Results:          Recommendations/Interventions:  No recommendations at this time. Will continue to monitor.     Rowena Elliott RPH  Zee 10, 2022

## 2022-06-10 NOTE — PLAN OF CARE
"/63 (BP Location: Left arm, Patient Position: Semi-Gaston's, Cuff Size: Adult Regular)   Pulse 100   Temp 97.6  F (36.4  C) (Tympanic)   Resp 20   Ht 1.575 m (5' 2\")   Wt 61.4 kg (135 lb 5.8 oz)   SpO2 95%   BMI 24.76 kg/m      Pt A&O, VSS, afebrile, and denies pain. On room air. Lung sounds have rhonchi in the upper lobes and are diminished in the bases. Cough is infrequent, congested, and nonproductive. Per ICU report, telemetry: SR 90s with prolonged QT. Incont of stool this shift. Jain cath in place for wound healing, output as charted. Turned Q2hrs and barrier cream applied to bottom. Rest of assessment as charted. IV patent and SL. Call light within reach and bed alarm on.    Face to face report given with opportunity to observe patient.    Report given to LON Rosen RN   6/10/2022  "

## 2022-06-10 NOTE — PLAN OF CARE
Goal Outcome Evaluation:     Pt is alert, and orientated x 4 this shift.   Pt denies pain this shift. Pt afebrile, remains on 1 liter per nasal cannula.   Crackles though lung fields. Pt has frequent congested nonproductive cough.   Pt is extensive assist to reposition/turn Q 2 hours. Excoriation/scabbing with redness to buttocks and coccyx. Barrier cream applied to area.   Jain catheter in place with adequate straw yellow output this shift.   Pt incontinent of stool x 1 this shift.   +2 Edema present to bilateral lower feet, and ankles. Heels are elevated in bed.     Face to face report given with opportunity to observe patient.    Report given to LON Almonte.     Alexandra Preciado RN   6/10/2022  6:53 AM

## 2022-06-10 NOTE — PROGRESS NOTES
Care Transitions focused note:      Writer spoke with patient's daughter, Jess, to complete CM assessment.     Jess reports that patient lives alone at home and prior to this illness, was able to bear weight and complete some ADLs independently. Jess reports that she would go to patient's house twice weekly to assist with chores/errands as needed. Jess also reports that patient's neighbor does bring patient food and assists her with things around the house as needed, as well.     Therapy is recommending short term rehab for patient.     Pt/family was provided with the Medicare Compare list for SNF.  Discussed associated Medicare star ratings to assist with choice for referrals/discharge planning Yes    Education was given to pt/family that star ratings are updated/maintained by Medicare and can be reviewed by visiting www.medicare.gov Yes    Patient/family choices for facility in priority are:   First Choice : Skilled Nursing Facility [facility]    [phone]    Second Choice: Skilled Nursing Facility [facility]         [phone]

## 2022-06-10 NOTE — PROGRESS NOTES
[facility]  Hospitalist Progress Note          Assessment and Plan:     Medicine Progress Note - Hospitalist Service     Date of Admission:  6/7/2022        Assessment & Plan             Raquel Sanchez is a 72 year old female admitted on 6/7/2022. She presents from home because of weakness and inability to take care of herself or even perform simplest tasks. Admitted to treat acute problems (see below) and NH placement.      Hospital problems:   1.  Lower extremities muscle weakness.    It appears that the patient's weakness is a much more profound in the lower extremities.  Particularly left lower extremity.  In light of this, we will further investigate this with MRI of the lumbar spine as well as the brain.  I spoke with Dr. Robbins, neurologist at [facility], who noted that patient having more weakness on the left side but Babinski on the right may indicate higher level of involvement; thus, imaging the whole spine is indicated.  MRI of the cervical, thoracic, and lumbar spine have been ordered.  - June 9: Patient's MRI of the brain, cervical and thoracic spine did not show any acute findings.  Lumbar spine MRI showed atrophy of the iliac muscles as well as of the paraspinous muscles, gluteus tyson and minus.  It also showed degenerative anterolisthesis of L4 relative to L5 with severe bilateral facet joint degenerative changes mild narrowing of the central canal and moderately narrowing the lateral recess with a slight mass-effect upon the intrathecal and exiting L5 nerve roots worse on the left neuroforamen layering is similar in appearance with the bilateral ganglionic abutment worse on the left. Dr. David, neurologist at [facility], who noted that there was no acute findings and recommended consulting neurosurgery.  -Zee 10:  I am not certain who may have been consulted and will have to look further into this.       2.  Confirmed COVID-19 infection.    Patient reports  significant coughing but no respiratory distress is reported and she is not requiring any supplemental oxygen.  We will just continue to monitor and treat patient's symptoms.  -June 9: Patient's respiratory status is stable on 1 L/min nasal cannula oxygen.  -Zee 10: Off O2     3.  Abnormal chest CT with left effusion.    Fluid and debris's dependent portion of the left mainstem bronchus with thickening of the lingular bronchus and occlusion of the left lower lobe bronchus left hilar or endobronchial mass not excluded.  Patient may need bronchoscopy in the future.     4. Long QT history with VT.   Episode happened April 22nd 2022.  she had also low potassium and magnesium.   She is not on any antiarrhythmics.  Echo April 22 showed good ejection fraction.  She had stress test after that and followed with the cardiology.  She has scheduled appointment with cardiology 6/10/2022.  Avoid QT prolonging medications and continue on telemetry.     5.  Elevated D-dimer she has no PE.    Will use only DVT prophylaxis.     6.  UTI   IV ceftriaxone.  Patient's urine culture is growing lactose fermenting gram negative bacilli     7.  Protein calorie malnutrition.    We will get dietary consult.     8.  Tobacco and alcohol abuse.    We will give nicotine patch.  She said alcohol last time she was able to drink was a month ago.     9.  Possible aspiration.    Patient was evaluated by speech therapy and IDD SI 4 puréed diet and thin liquids were recommended     10.  Pressure ulcer stage I  Surgical wound care consult appreciated.  Treating the buttock with barrier ointment and frequent repositioning of the area for pressure relief.        Diet: Pureed Diet (level 4) Thin Liquids (level 0)    DVT Prophylaxis: Enoxaparin (Lovenox) SQ  Jain Catheter: PRESENT, indication: Wound Healing  Central Lines: None  Cardiac Monitoring: ACTIVE order. Indication: QTc prolonging medication (48 hours)  Code Status: Full Code          Disposition  Plan     Expected Discharge:  pending       Clinically Significant Risk Factors Present on Admission             # Severe Malnutrition: based on nutrition assessment           Interval History:     Patient remains stable.   Awaiting further planning as uncertain if neurosurgery is calling us back or not?               Medications:       [Held by provider] amLODIPine  10 mg Oral Daily     [Held by provider] atorvastatin  40 mg Oral QPM     cefTRIAXone  1 g Intravenous Q24H     doxycycline (VIBRAMYCIN) IV  100 mg Intravenous Q12H     enoxaparin ANTICOAGULANT  40 mg Subcutaneous Q24H     nicotine  1 patch Transdermal Daily     nicotine   Transdermal Q8H     potassium chloride ER  20 mEq Oral BID     sodium chloride (PF)  3 mL Intracatheter Q8H     sodium chloride (PF)  5 mL Intravenous Q8H                  Physical Exam:     Vitals:    06/10/22 0105 06/10/22 0140 06/10/22 0518 06/10/22 0631   BP:    113/57   BP Location:    Left arm   Patient Position:    Semi-Gaston's   Cuff Size:    Adult Regular   Pulse:    101   Resp:    18   Temp:    96.8  F (36  C)   TempSrc:    Tympanic   SpO2: (!) 91% 94%  97%   Weight:   61.4 kg (135 lb 5.8 oz)    Height:             Vital Sign Ranges  Temperature Temp  Av.8  F (36.6  C)  Min: 96.8  F (36  C)  Max: 98.6  F (37  C)   Blood pressure Systolic (24hrs), Av , Min:83 , Max:117        Diastolic (24hrs), Av, Min:48, Max:71      Pulse Pulse  Av  Min: 90  Max: 110   Respirations Resp  Av  Min: 18  Max: 18   Pulse oximetry SpO2  Av.5 %  Min: 66 %  Max: 100 %         Intake/Output Summary (Last 24 hours) at 6/10/2022 1132  Last data filed at 6/10/2022 1030  Gross per 24 hour   Intake 1775 ml   Output 1400 ml   Net 375 ml       General:  Elderly woman appears lethargic   Heart:  RRR, S1 S2, No murmurs, no rubs, no gallops.  Resp: CTA bilaterally.  No wheezes, no rhonchi, no crackles.  Abd: Soft/NT/ND/Positve BS.  Ext: No edema noted in either lower  extremity  Neuro:  LE weakness noted bilaterally L > R.    Peripheral IV 06/09/22 Right Upper forearm (Active)   Site Assessment WDL 06/10/22 0106   Line Status Infusing 06/10/22 0106   Phlebitis Scale 0-->no symptoms 06/10/22 0106   Infiltration Scale 0 06/10/22 0106   Number of days: 1       Urethral Catheter 06/07/22 (Active)   Tube Description Positional 06/10/22 0129   Catheter Care Catheter wipes 06/10/22 0129   Collection Container Standard 06/10/22 0129   Securement Method Securing device (Describe) 06/10/22 0129   Rationale for Continued Use Wound Healing 06/10/22 0129   Urine Output 450 mL 06/10/22 0129   Number of days: 3       Wound Coccyx (Active)   Wound Bed Red 06/10/22 0129   Anahy-wound Assessment Erythema 06/10/22 0129   Drainage Amount None 06/10/22 0129   Wound Care/Cleansing Soap and water 06/10/22 0129   Dressing Open to air 06/10/22 0129   Number of days: 3     No line/device             Data:     Lab Results   Component Value Date    WBC 10.2 06/10/2022    HGB 9.8 (L) 06/10/2022    HCT 31.7 (L) 06/10/2022     06/10/2022     06/10/2022    POTASSIUM 4.3 06/10/2022    CHLORIDE 113 (H) 06/10/2022    CO2 23 06/10/2022    BUN 2 (L) 06/10/2022    CR 0.30 (L) 06/10/2022    GLC 78 06/10/2022    DD 1.84 (H) 06/07/2022    NTBNPI 125 06/07/2022    AST 40 06/08/2022    ALT 18 06/08/2022    ALKPHOS 318 (H) 06/08/2022    BILITOTAL 0.5 06/08/2022    INR 0.97 06/07/2022     Lactic Acid   Date Value Ref Range Status   06/07/2022 1.5 0.7 - 2.0 mmol/L Final   10/14/2019 3.0 (H) 0.7 - 2.0 mmol/L Final     Comment:     Significant value called to and read back by  Samantha Chauhan at 2248 on 10/14/19 by SHANTHI Donaldson, DO

## 2022-06-10 NOTE — PROVIDER NOTIFICATION
Patient tolerating fluids and has adequate urine output from ojeda. Messaged MD to see if IV fluids can be discontinued. Order received to discontinue IV fluids.

## 2022-06-10 NOTE — PROGRESS NOTES
06/10/22 1100   Appointment Canceled   Appointment Canceled Patient declined   Cancel Comments Pt declined due to fatigue.  Education provided regarding the risks of immobility. Pt continued to decline. Will attempt again when able.   Signing Clinician's Name / Credentials   Signing clinician's name / credentials Ana Lombardo DPT   Quick Adds   Rehab Discipline PT

## 2022-06-10 NOTE — PLAN OF CARE
"/61 (BP Location: Left arm)   Pulse 96   Temp 98.6  F (37  C) (Tympanic)   Resp 18   Ht 1.575 m (5' 2\")   Wt 58.6 kg (129 lb 3 oz)   SpO2 98%   BMI 23.63 kg/m      Pt alert and oriented upon initial assessment.  C/O ABD pain, only requested Klonopin at HS. ABD distended, BS active. Fine crackles in bases noted, sating 96% RA. HR regular. Bottom excoriated, T/R q2h, barrier creme applied. INC of stool. Jain output adequate. No falls or injuries this shift. Will continue to monitor.     Face to face report given with opportunity to observe patient.    Report given to LON Morris RN   6/9/2022  11:03 PM    \  "

## 2022-06-11 ENCOUNTER — ANESTHESIA (OUTPATIENT)
Dept: MEDSURG UNIT | Facility: HOSPITAL | Age: 72
DRG: 177 | End: 2022-06-11
Payer: MEDICARE

## 2022-06-11 ENCOUNTER — ANESTHESIA EVENT (OUTPATIENT)
Dept: MEDSURG UNIT | Facility: HOSPITAL | Age: 72
DRG: 177 | End: 2022-06-11
Payer: MEDICARE

## 2022-06-11 LAB
ALBUMIN SERPL-MCNC: 1.3 G/DL (ref 3.4–5)
ANION GAP SERPL CALCULATED.3IONS-SCNC: 3 MMOL/L (ref 3–14)
BUN SERPL-MCNC: 2 MG/DL (ref 7–30)
CALCIUM SERPL-MCNC: 7.6 MG/DL (ref 8.5–10.1)
CHLORIDE BLD-SCNC: 110 MMOL/L (ref 94–109)
CO2 SERPL-SCNC: 27 MMOL/L (ref 20–32)
CREAT SERPL-MCNC: 0.27 MG/DL (ref 0.52–1.04)
ERYTHROCYTE [DISTWIDTH] IN BLOOD BY AUTOMATED COUNT: 14.6 % (ref 10–15)
GFR SERPL CREATININE-BSD FRML MDRD: >90 ML/MIN/1.73M2
GLUCOSE BLD-MCNC: 90 MG/DL (ref 70–99)
HCT VFR BLD AUTO: 27.2 % (ref 35–47)
HGB BLD-MCNC: 9.3 G/DL (ref 11.7–15.7)
MAGNESIUM SERPL-MCNC: 1.6 MG/DL (ref 1.6–2.3)
MCH RBC QN AUTO: 31.6 PG (ref 26.5–33)
MCHC RBC AUTO-ENTMCNC: 34.2 G/DL (ref 31.5–36.5)
MCV RBC AUTO: 93 FL (ref 78–100)
PHOSPHATE SERPL-MCNC: 2.7 MG/DL (ref 2.5–4.5)
PLATELET # BLD AUTO: 425 10E3/UL (ref 150–450)
POTASSIUM BLD-SCNC: 3.6 MMOL/L (ref 3.4–5.3)
RBC # BLD AUTO: 2.94 10E6/UL (ref 3.8–5.2)
SODIUM SERPL-SCNC: 140 MMOL/L (ref 133–144)
WBC # BLD AUTO: 5.4 10E3/UL (ref 4–11)

## 2022-06-11 PROCEDURE — 250N000013 HC RX MED GY IP 250 OP 250 PS 637: Performed by: INTERNAL MEDICINE

## 2022-06-11 PROCEDURE — 36415 COLL VENOUS BLD VENIPUNCTURE: CPT | Performed by: INTERNAL MEDICINE

## 2022-06-11 PROCEDURE — 80069 RENAL FUNCTION PANEL: CPT | Performed by: INTERNAL MEDICINE

## 2022-06-11 PROCEDURE — 370N000003 HC ANESTHESIA WARD SERVICE: Performed by: NURSE ANESTHETIST, CERTIFIED REGISTERED

## 2022-06-11 PROCEDURE — 99232 SBSQ HOSP IP/OBS MODERATE 35: CPT | Performed by: INTERNAL MEDICINE

## 2022-06-11 PROCEDURE — 250N000011 HC RX IP 250 OP 636: Performed by: INTERNAL MEDICINE

## 2022-06-11 PROCEDURE — 120N000001 HC R&B MED SURG/OB

## 2022-06-11 PROCEDURE — 85027 COMPLETE CBC AUTOMATED: CPT | Performed by: INTERNAL MEDICINE

## 2022-06-11 PROCEDURE — 83735 ASSAY OF MAGNESIUM: CPT | Performed by: INTERNAL MEDICINE

## 2022-06-11 RX ORDER — LEVOFLOXACIN 500 MG/1
500 TABLET, FILM COATED ORAL DAILY
Status: DISCONTINUED | OUTPATIENT
Start: 2022-06-11 | End: 2022-06-11

## 2022-06-11 RX ORDER — CEFDINIR 300 MG/1
300 CAPSULE ORAL 2 TIMES DAILY
Status: DISCONTINUED | OUTPATIENT
Start: 2022-06-12 | End: 2022-06-16

## 2022-06-11 RX ADMIN — LEVOFLOXACIN 500 MG: 500 TABLET, FILM COATED ORAL at 08:59

## 2022-06-11 RX ADMIN — CEFTRIAXONE SODIUM 1 G: 1 INJECTION, SOLUTION INTRAVENOUS at 03:40

## 2022-06-11 RX ADMIN — CLONAZEPAM 0.5 MG: 0.5 TABLET ORAL at 21:13

## 2022-06-11 RX ADMIN — ENOXAPARIN SODIUM 40 MG: 40 INJECTION SUBCUTANEOUS at 21:08

## 2022-06-11 RX ADMIN — POTASSIUM CHLORIDE 20 MEQ: 750 CAPSULE, EXTENDED RELEASE ORAL at 21:08

## 2022-06-11 RX ADMIN — POTASSIUM CHLORIDE 20 MEQ: 750 CAPSULE, EXTENDED RELEASE ORAL at 09:00

## 2022-06-11 ASSESSMENT — ACTIVITIES OF DAILY LIVING (ADL)
ADLS_ACUITY_SCORE: 38
ADLS_ACUITY_SCORE: 34
ADLS_ACUITY_SCORE: 31
ADLS_ACUITY_SCORE: 34
ADLS_ACUITY_SCORE: 31
ADLS_ACUITY_SCORE: 38
ADLS_ACUITY_SCORE: 38
ADLS_ACUITY_SCORE: 31
ADLS_ACUITY_SCORE: 31
ADLS_ACUITY_SCORE: 38

## 2022-06-11 NOTE — PHARMACY-MEDICATION REGIMEN REVIEW
Pharmacy Antimicrobial Stewardship/COVID Assessment     Current Antimicrobial Therapy:  Anti-infectives (From now, onward)    Start     Dose/Rate Route Frequency Ordered Stop    22 0900  levofloxacin (LEVAQUIN) tablet 500 mg         500 mg Oral DAILY 22 0720            Indication: UTI + COVID    Days of Therapy: Day 1 Levaquin, Day 4 Abx     Pertinent Labs:  Recent Labs   Lab Test 22  0613 06/10/22  0625 22  0726   WBC 5.4 10.2 5.3     Recent Labs   Lab Test 22  0535 22  1650   LACT  --  1.5   CRP 16.9* 16.4*   PCAL  --  0.26*        Temperature:  Temp (24hrs), Av.5  F (36.4  C), Min:97  F (36.1  C), Max:97.9  F (36.6  C)    Culture Results:   (22) Urine = Klebsiella + E.coli + Aerococcus pneumoniae (Identification to follow)    (22) COVID = positive    Recommendations/Interventions:  1. D-dimer has not been checked since admit; recheck with AM labs  2. Levaquin prolongs QT interval and patient has hx of QT prolongation. Change to oral cefdinir 300 mg PO BID    Sen Goodwin, [facility]  2022

## 2022-06-11 NOTE — PLAN OF CARE
"/68   Pulse 101   Temp 97.6  F (36.4  C) (Tympanic)   Resp 20   Ht 1.575 m (5' 2\")   Wt 61.4 kg (135 lb 5.8 oz)   SpO2 94%   BMI 24.76 kg/m      Pt alert and oriented. C/O generalized pain, only requested Klonopin at HS. Lungs Dim in BLL, satting 94% RA. Skin on bottom improving, t/r q2h, barrier creme. Jain patent, output adequate. Up to chair for supper, tolerated an hour. Ax2 at least. Poor appetite per usual.      ABX held due to loss of IV, awaiting anesthesia after unsuccessful attempts, paged MD to ask if can switch to oral ABX as pt peripherals are all infiltrated and raw, awaiting call back.  Will continue to monitor.     Face to face report given with opportunity to observe patient.    Report given to LON Morris RN   6/10/2022  11:06 PM      "

## 2022-06-11 NOTE — PLAN OF CARE
Goal Outcome Evaluation:    Pt is alert, and orientated x 4 this shift. Pt denies pain. VSS. Pt afebrile.   IV fluids stopped this morning as peripheral IV in Left arm appear to be infiltrated.   Pt is extensive assist of 2 this shift for turning/reposition Q 2 hours.   Jain catheter remains in  place with straw yellow adequate output this shift.   Excoriation, redness present to coccyx/ buttocks, barrier cream applied to area.   Groin appears red with rash, and Labia appears swollen red. Sticky note left for provider.     Face to face report given with opportunity to observe patient.    Report given to LON Mercado.     Alexandra Preciado RN   6/11/2022  8:14 AM

## 2022-06-11 NOTE — PROGRESS NOTES
[facility]  Hospitalist Progress Note          Assessment and Plan:     Medicine Progress Note - Hospitalist Service     Date of Admission:  6/7/2022        Assessment & Plan             Raquel Sanchez is a 72 year old female admitted on 6/7/2022. She presents from home because of weakness and inability to take care of herself or even perform simplest tasks. Admitted to treat acute problems (see below) and NH placement.      Hospital problems:   1.  Lower extremities muscle weakness.    It appears that the patient's weakness is a much more profound in the lower extremities.  Particularly left lower extremity.  In light of this, we will further investigate this with MRI of the lumbar spine as well as the brain.  I spoke with Dr. Robbins, neurologist at [facility], who noted that patient having more weakness on the left side but Babinski on the right may indicate higher level of involvement; thus, imaging the whole spine is indicated.  MRI of the cervical, thoracic, and lumbar spine have been ordered.  - June 9: Patient's MRI of the brain, cervical and thoracic spine did not show any acute findings.  Lumbar spine MRI showed atrophy of the iliac muscles as well as of the paraspinous muscles, gluteus tyson and minus.  It also showed degenerative anterolisthesis of L4 relative to L5 with severe bilateral facet joint degenerative changes mild narrowing of the central canal and moderately narrowing the lateral recess with a slight mass-effect upon the intrathecal and exiting L5 nerve roots worse on the left neuroforamen layering is similar in appearance with the bilateral ganglionic abutment worse on the left. Dr. David, neurologist at [facility], who noted that there was no acute findings and recommended consulting neurosurgery.  -Zee 10:  I am not certain who may have been consulted and will have to look further into this.   -June 11:  Discussed with Neurosurgery at [facility].   Recommend outpatient follow up.        2.  Confirmed COVID-19 infection.    Patient reports significant coughing but no respiratory distress is reported and she is not requiring any supplemental oxygen.  We will just continue to monitor and treat patient's symptoms.  -June 9: Patient's respiratory status is stable on 1 L/min nasal cannula oxygen.  -Zee 10: Off O2  -June 11: 1 L NC overnight      3.  Abnormal chest CT with left effusion. Possible LLL Atelectasis w bilateral pleural effusions    Fluid and debris's dependent portion of the left mainstem bronchus with thickening of the lingular bronchus and occlusion of the left lower lobe bronchus left hilar or endobronchial mass not excluded.  Patient may need bronchoscopy in the future.     4. Long QT history with VT.   Episode happened April 22nd 2022.  she had also low potassium and magnesium.   She is not on any antiarrhythmics.  Echo April 22 showed good ejection fraction.  She had stress test after that and followed with the cardiology.  She has scheduled appointment with cardiology 6/10/2022.  Avoid QT prolonging medications and continue on telemetry.     5.  Elevated D-dimer she has no PE.    Will use only DVT prophylaxis.     6.  UTI   Switched to oral Levaquin  Patient's urine culture is growing Klebsiella and E Coli      7.  Protein calorie malnutrition.    We will get dietary consult.     8.  Tobacco and alcohol abuse.    We will give nicotine patch.  She said alcohol last time she was able to drink was a month ago.     9.  Possible aspiration.    Patient was evaluated by speech therapy and IDD SI 4 puréed diet and thin liquids were recommended     10.  Pressure ulcer stage I  Surgical wound care consult appreciated.  Treating the buttock with barrier ointment and frequent repositioning of the area for pressure relief.    11. Hypokalemia: Replaced         Diet: Pureed Diet (level 4) Thin Liquids (level 0)    DVT Prophylaxis: Enoxaparin (Lovenox) SQ  Jain  Catheter: PRESENT, indication: Wound Healing  Central Lines: None  Cardiac Monitoring: ACTIVE order. Indication: QTc prolonging medication (48 hours)  Code Status: Full Code          Disposition Plan     Expected Discharge:  PT/OT and placement       Clinically Significant Risk Factors Present on Admission             # Severe Malnutrition: based on nutrition assessment           Interval History:     No acute changes.  Lost IV access several times overnight.  Patient still with report of LE weakness.                Medications:       [Held by provider] amLODIPine  10 mg Oral Daily     [Held by provider] atorvastatin  40 mg Oral QPM     enoxaparin ANTICOAGULANT  40 mg Subcutaneous Q24H     levofloxacin  500 mg Oral Daily     nicotine  1 patch Transdermal Daily     nicotine   Transdermal Q8H     potassium chloride ER  20 mEq Oral BID     sodium chloride (PF)  3 mL Intracatheter Q8H     sodium chloride (PF)  5 mL Intravenous Q8H                  Physical Exam:     Vitals:    22 0033 22 0417 22 0638 22 0854   BP:   110/61 106/60   BP Location:   Left arm Right arm   Patient Position:   Supine    Cuff Size:   Adult Regular    Pulse:   97    Resp:   20 16   Temp:   97.5  F (36.4  C) 97  F (36.1  C)   TempSrc:   Tympanic Tympanic   SpO2: 92%  94% 93%   Weight:  60.3 kg (132 lb 15 oz)     Height:             Vital Sign Ranges  Temperature Temp  Av.5  F (36.4  C)  Min: 97  F (36.1  C)  Max: 97.9  F (36.6  C)   Blood pressure Systolic (24hrs), Av , Min:106 , Max:117        Diastolic (24hrs), Av, Min:60, Max:68      Pulse Pulse  Av.2  Min: 91  Max: 107   Respirations Resp  Av.5  Min: 16  Max: 20   Pulse oximetry SpO2  Av.1 %  Min: 89 %  Max: 95 %         Intake/Output Summary (Last 24 hours) at 2022 0919  Last data filed at 2022 0642  Gross per 24 hour   Intake 1725 ml   Output 2100 ml   Net -375 ml       General:  Alert and Orientated.  Heart:  RRR, S1 S2, No  murmurs, no rubs, no gallops.  Resp: CTA bilaterally.  No wheezes, no rhonchi, no crackles.  Abd: Soft/NT/ND/Positve BS.  Ext: No edema noted in either lower extremity  Neuro: LE weakness, L 2-3/5> R 3/5    Peripheral IV 06/11/22 Left Upper arm (Active)   Site Assessment WDL 06/11/22 0400   Line Status Infusing 06/11/22 0400   Phlebitis Scale 0-->no symptoms 06/11/22 0400   Infiltration Scale 0 06/11/22 0400   Number of days: 0       Urethral Catheter 06/07/22 (Active)   Tube Description Positional 06/11/22 0051   Catheter Care Done 06/10/22 2228   Collection Container Standard 06/11/22 0051   Securement Method Securing device (Describe) 06/11/22 0051   Rationale for Continued Use Wound Healing 06/11/22 0051   Urine Output 150 mL 06/11/22 0642   Number of days: 4       Wound Coccyx (Active)   Wound Bed Erythema, non-blanchable, skin intact 06/11/22 0046   Anahy-wound Assessment Erythema 06/11/22 0046   Drainage Amount None 06/11/22 0046   Wound Care/Cleansing Barrier applied  06/11/22 0046   Dressing Open to air 06/11/22 0046   Number of days: 4     No line/device             Data:     Lab Results   Component Value Date    WBC 5.4 06/11/2022    HGB 9.3 (L) 06/11/2022    HCT 27.2 (L) 06/11/2022     06/11/2022     06/11/2022    POTASSIUM 3.6 06/11/2022    CHLORIDE 110 (H) 06/11/2022    CO2 27 06/11/2022    BUN 2 (L) 06/11/2022    CR 0.27 (L) 06/11/2022    GLC 90 06/11/2022    DD 1.84 (H) 06/07/2022    NTBNPI 125 06/07/2022    AST 40 06/08/2022    ALT 18 06/08/2022    ALKPHOS 318 (H) 06/08/2022    BILITOTAL 0.5 06/08/2022    INR 0.97 06/07/2022     Lactic Acid   Date Value Ref Range Status   06/07/2022 1.5 0.7 - 2.0 mmol/L Final   10/14/2019 3.0 (H) 0.7 - 2.0 mmol/L Final     Comment:     Significant value called to and read back by  Samantha Chauhan at 2248 on 10/14/19 by SHANTHI Donaldson, DO

## 2022-06-11 NOTE — ANESTHESIA PREPROCEDURE EVALUATION
Anesthesia Pre-Procedure Evaluation    Patient: Raquel Sanchez   MRN: [id number] : 1950        Procedure : * No procedures listed *          History reviewed. No pertinent past medical history.   Past Surgical History:   Procedure Laterality Date     GYN SURGERY      hysterectomy     IR CONSULTATION FOR IR EXAM  2022      Allergies   Allergen Reactions     Tape [Adhesive Tape] Blisters     Seasonal Allergies      Poison Ivy Extract [Extract Of Poison Riddhi] Rash      Social History     Tobacco Use     Smoking status: Current Every Day Smoker     Packs/day: 1.00     Years: 55.00     Pack years: 55.00     Types: Cigarettes     Start date: 1966     Smokeless tobacco: Never Used   Substance Use Topics     Alcohol use: Yes     Alcohol/week: 0.0 standard drinks     Comment: occasional      Wt Readings from Last 1 Encounters:   06/10/22 61.4 kg (135 lb 5.8 oz)              OUTSIDE LABS:  CBC:   Lab Results   Component Value Date    WBC 10.2 06/10/2022    WBC 5.3 2022    HGB 9.8 (L) 06/10/2022    HGB 9.4 (L) 2022    HCT 31.7 (L) 06/10/2022    HCT 27.1 (L) 2022     06/10/2022     (H) 2022     BMP:   Lab Results   Component Value Date     06/10/2022     2022    POTASSIUM 4.3 06/10/2022    POTASSIUM 3.6 2022    CHLORIDE 113 (H) 06/10/2022    CHLORIDE 105 2022    CO2 23 06/10/2022    CO2 26 2022    BUN 2 (L) 06/10/2022    BUN 1 (L) 2022    CR 0.30 (L) 06/10/2022    CR 0.31 (L) 2022    GLC 78 06/10/2022    GLC 84 2022     COAGS:   Lab Results   Component Value Date    INR 0.97 2022     POC: No results found for: BGM, HCG, HCGS  HEPATIC:   Lab Results   Component Value Date    ALBUMIN 1.2 (L) 06/10/2022    PROTTOTAL 5.8 (L) 2022    ALT 18 2022    AST 40 2022    ALKPHOS 318 (H) 2022    BILITOTAL 0.5 2022     OTHER:   Lab Results   Component Value Date    LACT 1.5 2022    ROSA 7.5 (L)  06/10/2022    PHOS 2.7 06/10/2022    MAG 1.6 06/10/2022    LIPASE 85 06/07/2022    TSH 0.97 06/07/2022    CRP 16.9 (H) 06/08/2022       Anesthesia Plan       - Procedure: Procedure only, no anesthetic delivered                    Consents            Postoperative Care            Comments:                OLGA Morris CRNA

## 2022-06-11 NOTE — PLAN OF CARE
A&O, makes needs known. IV infiltrated, IV antibiotics changed to oral. Vital signs stable, afebrile. Does have a productive cough. Phlegm is clear in color. When coughing sounds congested, lungs are clear but diminished through out.   3 to 4 plus pitting edema in bilateral lower extremities.   Jain in place to help promote healing in linh area,  draining clear yellow urine. Coccyx & linh area red, inflamed. Per staff assisting writer area is improving.  Barrier cream is applied with each repositioning and after bowel movements.  Pleasant and cooperative with cares,      Face to face report given with opportunity to observe patient.    Report given to LON Cortes RN   6/11/2022  3:22 PM

## 2022-06-11 NOTE — PROGRESS NOTES
06/11/22 1100   Appointment Canceled   Appointment Canceled With other staff/provider   Cancel Comments Will attempt again when able   Signing Clinician's Name / Credentials   Signing clinician's name / credentials Ana Burrows DPT   Quick Adds   Rehab Discipline PT

## 2022-06-12 LAB
ALBUMIN SERPL-MCNC: 1.3 G/DL (ref 3.4–5)
ANION GAP SERPL CALCULATED.3IONS-SCNC: 4 MMOL/L (ref 3–14)
BUN SERPL-MCNC: 2 MG/DL (ref 7–30)
CALCIUM SERPL-MCNC: 7.7 MG/DL (ref 8.5–10.1)
CHLORIDE BLD-SCNC: 108 MMOL/L (ref 94–109)
CO2 SERPL-SCNC: 28 MMOL/L (ref 20–32)
CREAT SERPL-MCNC: 0.32 MG/DL (ref 0.52–1.04)
D DIMER PPP FEU-MCNC: 1.09 UG/ML FEU (ref 0–0.5)
ERYTHROCYTE [DISTWIDTH] IN BLOOD BY AUTOMATED COUNT: 15 % (ref 10–15)
GFR SERPL CREATININE-BSD FRML MDRD: >90 ML/MIN/1.73M2
GLUCOSE BLD-MCNC: 93 MG/DL (ref 70–99)
HCT VFR BLD AUTO: 26.7 % (ref 35–47)
HGB BLD-MCNC: 9.1 G/DL (ref 11.7–15.7)
MAGNESIUM SERPL-MCNC: 1.6 MG/DL (ref 1.6–2.3)
MCH RBC QN AUTO: 31.5 PG (ref 26.5–33)
MCHC RBC AUTO-ENTMCNC: 34.1 G/DL (ref 31.5–36.5)
MCV RBC AUTO: 92 FL (ref 78–100)
PHOSPHATE SERPL-MCNC: 2.6 MG/DL (ref 2.5–4.5)
PLATELET # BLD AUTO: 404 10E3/UL (ref 150–450)
POTASSIUM BLD-SCNC: 3.5 MMOL/L (ref 3.4–5.3)
RBC # BLD AUTO: 2.89 10E6/UL (ref 3.8–5.2)
SODIUM SERPL-SCNC: 140 MMOL/L (ref 133–144)
WBC # BLD AUTO: 7.9 10E3/UL (ref 4–11)

## 2022-06-12 PROCEDURE — 80069 RENAL FUNCTION PANEL: CPT | Performed by: INTERNAL MEDICINE

## 2022-06-12 PROCEDURE — 83735 ASSAY OF MAGNESIUM: CPT | Performed by: INTERNAL MEDICINE

## 2022-06-12 PROCEDURE — 85027 COMPLETE CBC AUTOMATED: CPT | Performed by: INTERNAL MEDICINE

## 2022-06-12 PROCEDURE — 250N000013 HC RX MED GY IP 250 OP 250 PS 637: Performed by: INTERNAL MEDICINE

## 2022-06-12 PROCEDURE — 250N000011 HC RX IP 250 OP 636: Performed by: INTERNAL MEDICINE

## 2022-06-12 PROCEDURE — 85379 FIBRIN DEGRADATION QUANT: CPT | Performed by: INTERNAL MEDICINE

## 2022-06-12 PROCEDURE — 120N000001 HC R&B MED SURG/OB

## 2022-06-12 PROCEDURE — 99221 1ST HOSP IP/OBS SF/LOW 40: CPT | Performed by: SURGERY

## 2022-06-12 PROCEDURE — 99232 SBSQ HOSP IP/OBS MODERATE 35: CPT | Performed by: INTERNAL MEDICINE

## 2022-06-12 PROCEDURE — 36415 COLL VENOUS BLD VENIPUNCTURE: CPT | Performed by: INTERNAL MEDICINE

## 2022-06-12 RX ADMIN — CEFDINIR 300 MG: 300 CAPSULE ORAL at 09:25

## 2022-06-12 RX ADMIN — ENOXAPARIN SODIUM 40 MG: 40 INJECTION SUBCUTANEOUS at 22:16

## 2022-06-12 RX ADMIN — CLONAZEPAM 0.5 MG: 0.5 TABLET ORAL at 22:15

## 2022-06-12 RX ADMIN — POTASSIUM CHLORIDE 20 MEQ: 750 CAPSULE, EXTENDED RELEASE ORAL at 09:25

## 2022-06-12 RX ADMIN — CEFDINIR 300 MG: 300 CAPSULE ORAL at 22:15

## 2022-06-12 RX ADMIN — POTASSIUM CHLORIDE 20 MEQ: 750 CAPSULE, EXTENDED RELEASE ORAL at 22:15

## 2022-06-12 ASSESSMENT — ACTIVITIES OF DAILY LIVING (ADL)
ADLS_ACUITY_SCORE: 34
ADLS_ACUITY_SCORE: 38
ADLS_ACUITY_SCORE: 34
ADLS_ACUITY_SCORE: 38
ADLS_ACUITY_SCORE: 34
ADLS_ACUITY_SCORE: 34
ADLS_ACUITY_SCORE: 38
ADLS_ACUITY_SCORE: 38
ADLS_ACUITY_SCORE: 34
ADLS_ACUITY_SCORE: 38
ADLS_ACUITY_SCORE: 42
ADLS_ACUITY_SCORE: 38

## 2022-06-12 NOTE — PLAN OF CARE
"/56 (BP Location: Left arm)   Pulse 103   Temp 97.6  F (36.4  C) (Tympanic)   Resp 18   Ht 1.575 m (5' 2\")   Wt 60.3 kg (132 lb 15 oz)   SpO2 (!) 90%   BMI 24.31 kg/m    Pt is A&O x4. Soreness to buttock/perineum, incontinent cares and barrier cream prn. Otherwise denies pain, nausea, or sob. Increased to 3L NC to maintain order parameters of >94%. Jain patent draining yellow urine, adequate output, see chart. Assessment as charted. Appears to be in good spirits this morning. IV SL and patent. Call light in reach and pt makes needs known.     Face to face report given with opportunity to observe patient.    Report given to LON Mercado RN   6/12/2022  7:20 AM        "

## 2022-06-12 NOTE — PHARMACY-MEDICATION REGIMEN REVIEW
Pharmacy Antimicrobial Stewardship/COVID Assessment     Current Antimicrobial Therapy:  Anti-infectives (From now, onward)    Start     Dose/Rate Route Frequency Ordered Stop    22 0900  cefdinir (OMNICEF) capsule 300 mg         300 mg Oral 2 TIMES DAILY 22 1224          Indication: UTI + COVID     Days of Therapy: Day 1 Cefdinir, Day 6 Abx     Pertinent Labs:  Recent Labs   Lab Test 22  0628 22  0613 06/10/22  0625   WBC 7.9 5.4 10.2     Recent Labs   Lab Test 22  0535 22  1650   LACT  --  1.5   CRP 16.9* 16.4*   PCAL  --  0.26*            Temperature:  Temp (24hrs), Av.8  F (36.6  C), Min:97.6  F (36.4  C), Max:97.9  F (36.6  C)    Culture Results:   (22) Urine = Klebsiella + E.coli + Aerococcus pneumoniae (Identification to follow)    (22) COVID = positive    Recommendations/Interventions:  1. Stop antibiotics tomorrow (to complete 7 day course)    Sen Goodwin, [facility]  2022

## 2022-06-12 NOTE — PLAN OF CARE
A&O, vital signs stable, 02 is at 2LPM stating 93%.  Lungs are clear but diminished. Productive cough with clear phlegm. States cough has slowed down some.   Upset about having a pureed  diet, will only drink ensure, coffee and milk. Refuses to eat pureed food.  Excoriated bottom appears to be improving.  Is cleaned multiple times during this shift with new barrier cream applied each time. Does not appear as painful as it was.  She is repositioned every two hours and as needed.  Pleasant and cooperative with cares.

## 2022-06-12 NOTE — PROGRESS NOTES
[facility]  Hospitalist Progress Note          Assessment and Plan:     Medicine Progress Note - Hospitalist Service     Date of Admission:  6/7/2022        Assessment & Plan             Raquel Sanchez is a 72 year old female admitted on 6/7/2022. She presents from home because of weakness and inability to take care of herself or even perform simplest tasks. Admitted to treat acute problems (see below) and NH placement.      Hospital problems:   1.  Lower extremities muscle weakness.    It appears that the patient's weakness is a much more profound in the lower extremities.  Particularly left lower extremity.  In light of this, we will further investigate this with MRI of the lumbar spine as well as the brain.  I spoke with Dr. Robbins, neurologist at [facility], who noted that patient having more weakness on the left side but Babinski on the right may indicate higher level of involvement; thus, imaging the whole spine is indicated.  MRI of the cervical, thoracic, and lumbar spine have been ordered.  - June 9: Patient's MRI of the brain, cervical and thoracic spine did not show any acute findings.  Lumbar spine MRI showed atrophy of the iliac muscles as well as of the paraspinous muscles, gluteus tyson and minus.  It also showed degenerative anterolisthesis of L4 relative to L5 with severe bilateral facet joint degenerative changes mild narrowing of the central canal and moderately narrowing the lateral recess with a slight mass-effect upon the intrathecal and exiting L5 nerve roots worse on the left neuroforamen layering is similar in appearance with the bilateral ganglionic abutment worse on the left. Dr. David, neurologist at [facility], who noted that there was no acute findings and recommended consulting neurosurgery.  -Zee 10:  I am not certain who may have been consulted and will have to look further into this.   -June 11: Discussed with Neurosurgery at [facility].   Recommend outpatient follow up.  -June 12: Neurosurgery outpatient follow up        2.  Confirmed COVID-19 infection.  Acute respiratory failure due to Covid 19.     Patient reports significant coughing but no respiratory distress is reported and she is not requiring any supplemental oxygen.  We will just continue to monitor and treat patient's symptoms.  -June 9: Patient's respiratory status is stable on 1 L/min nasal cannula oxygen.  -Zee 10: Off O2  -June 11: 1 L NC overnight   -June 12: 1-2 L NC     3.  Abnormal chest CT with left effusion. Possible LLL Atelectasis w bilateral pleural effusions    Fluid and debris's dependent portion of the left mainstem bronchus with thickening of the lingular bronchus and occlusion of the left lower lobe bronchus left hilar or endobronchial mass not excluded.  Patient may need bronchoscopy in the future. Question as to result of Covid.       4. Long QT history with VT.   Episode happened April 22nd 2022.  She had also low potassium and magnesium.   She is not on any antiarrhythmics.  Echo April 22 showed good ejection fraction.  She had stress test after that and followed with the cardiology.  She has scheduled appointment with cardiology 6/10/2022.  Avoid QT prolonging medications and continue on telemetry.     5.  Elevated D-dimer she has no PE.    Will use only DVT prophylaxis.     6.  UTI   Patient's urine culture is growing Klebsiella and E Coli on oral Cefdinir     7.  Protein calorie malnutrition.       8.  Tobacco and alcohol abuse.    We will give nicotine patch.  She said alcohol last time she was able to drink was a month ago.     9.  Possible aspiration.    Patient was evaluated by speech therapy and IDD SI 4 puréed diet and thin liquids were recommended     10.  Pressure ulcer stage I  Surgical wound care consult appreciated.  Treating the buttock with barrier ointment and frequent repositioning of the area for pressure relief.     11. Hypokalemia:  Replaced    12. Anemia unknown etiology:  Does not appear to have acute blood loss but presenting Hgb was normal and now Hgb 9.1.  Continue to monitor but becoming more concerning.         Diet: Pureed Diet (level 4) Thin Liquids (level 0)    DVT Prophylaxis: Enoxaparin (Lovenox) SQ  Jain Catheter: PRESENT, indication: Wound Healing  Central Lines: None  Cardiac Monitoring: ACTIVE order. Indication: QTc prolonging medication (48 hours)  Code Status: Full Code          Disposition Plan     Expected Discharge:  PT/OT and placement along with Neurosurgery follow up.         Clinically Significant Risk Factors Present on Admission                          Interval History:     Stable but patient now complaining of cough.  Spoke to Neurosurgery yesterday and recommend outpatient follow up.  Patient will need placement and is aware of this.  Hgb continues to gradually drop.                Medications:       [Held by provider] amLODIPine  10 mg Oral Daily     [Held by provider] atorvastatin  40 mg Oral QPM     cefdinir  300 mg Oral BID     enoxaparin ANTICOAGULANT  40 mg Subcutaneous Q24H     nicotine  1 patch Transdermal Daily     nicotine   Transdermal Q8H     potassium chloride ER  20 mEq Oral BID     sodium chloride (PF)  3 mL Intracatheter Q8H     sodium chloride (PF)  5 mL Intravenous Q8H                  Physical Exam:     Vitals:    22 2222 22 0111 22 0513 22 0516   BP:  103/56 94/53 102/63   BP Location:  Left arm Left arm Right arm   Patient Position:       Cuff Size:       Pulse:  103 101    Resp:  18 18    Temp:  97.6  F (36.4  C) 97.8  F (36.6  C)    TempSrc:  Tympanic Tympanic    SpO2: (!) 91% (!) 90% (!) 89%    Weight:   58.1 kg (128 lb 1.4 oz)    Height:             Vital Sign Ranges  Temperature Temp  Av.7  F (36.5  C)  Min: 97.6  F (36.4  C)  Max: 97.8  F (36.6  C)   Blood pressure Systolic (24hrs), Av , Min:94 , Max:114        Diastolic (24hrs), Av, Min:53, Max:68       Pulse Pulse  Av  Min: 101  Max: 103   Respirations Resp  Av  Min: 16  Max: 18   Pulse oximetry SpO2  Av.7 %  Min: 88 %  Max: 100 %         Intake/Output Summary (Last 24 hours) at 2022 0857  Last data filed at 2022 0513  Gross per 24 hour   Intake 1040 ml   Output 3725 ml   Net -2685 ml       General:  Alert and Orientated.  NAD.  Heart:  RRR, S1 S2, No murmurs, no rubs, no gallops.  Resp: CTA bilaterally.  No wheezes, no rhonchi, no crackles.  Abd: Soft/NT/ND/Positve BS.  Ext: No edema noted in either lower extremity  Neuro:  No focal Neurologic deficits noted.    Peripheral IV 22 Left Upper arm (Active)   Site Assessment WDL 22 175   Line Status Saline locked 22 175   Phlebitis Scale 0-->no symptoms 22 175   Infiltration Scale 0 22 1750   Number of days: 1       Urethral Catheter 22 (Active)   Tube Description Positional 22 0111   Catheter Care Soap and water/perineal cleanser 22 0111   Collection Container Standard 22 0111   Securement Method Securing device (Describe) 22 0111   Rationale for Continued Use Wound Healing 22 0111   Urine Output 775 mL 22 0513   Number of days: 5       Wound Coccyx (Active)   Wound Bed Erythema, non-blanchable, skin intact 22 0111   Anahy-wound Assessment Erythema 22 0111   Drainage Amount None 22 1750   Wound Care/Cleansing Barrier applied  22 0111   Dressing Moisture barrrier 22 0111   Number of days: 5     No line/device             Data:     Lab Results   Component Value Date    WBC 7.9 2022    HGB 9.1 (L) 2022    HCT 26.7 (L) 2022     2022     2022    POTASSIUM 3.5 2022    CHLORIDE 108 2022    CO2 28 2022    BUN 2 (L) 2022    CR 0.32 (L) 2022    GLC 93 2022    DD 1.09 (H) 2022    NTBNPI 125 2022    AST 40 2022    ALT 18 2022    ALKPHOS 318 (H)  06/08/2022    BILITOTAL 0.5 06/08/2022    INR 0.97 06/07/2022     Lactic Acid   Date Value Ref Range Status   06/07/2022 1.5 0.7 - 2.0 mmol/L Final   10/14/2019 3.0 (H) 0.7 - 2.0 mmol/L Final     Comment:     Significant value called to and read back by  Samantha Chauhan at 2248 on 10/14/19 by SHANTHI Donaldson,

## 2022-06-12 NOTE — PLAN OF CARE
"Reason for hospital stay:  Covid    Living situation PTA: Home    Most recent vitals: /68   Pulse 97   Temp 97.7  F (36.5  C) (Tympanic)   Resp 16   Ht 1.575 m (5' 2\")   Wt 60.3 kg (132 lb 15 oz)   SpO2 (!) 91%   BMI 24.31 kg/m      Pain Management:  Denied any pain this evening.     LOC:  A+O x4 - calls appropriately and makes needs known. Tearful at times this shift and states \"have been depressed.\"     Cardiac:  WDL    Respiratory:  Lungs diminished throughout with frequent congested, productive cough. Sats decrease to low 80s on RA. Titrated O2 between 1 and 3 LPM this evening to maintain sats greater than 94%. Denies any shortness of breath.     GI:  WDL    :  Ojeda patent and draining adequate urine.    Skin Issues:  see flow sheets - ojeda in place for wound healing - barrier cream applied with repositioning.    IVF:  SL    ABX:  Continues on PO Omnicef        Face to face report given with opportunity to observe patient.    Report given to Ammy Sanchez RN   6/11/2022  11:47 PM      "

## 2022-06-13 ENCOUNTER — ANESTHESIA (OUTPATIENT)
Dept: MEDSURG UNIT | Facility: HOSPITAL | Age: 72
DRG: 177 | End: 2022-06-13
Payer: MEDICARE

## 2022-06-13 ENCOUNTER — APPOINTMENT (OUTPATIENT)
Dept: PHYSICAL THERAPY | Facility: HOSPITAL | Age: 72
DRG: 177 | End: 2022-06-13
Payer: MEDICARE

## 2022-06-13 ENCOUNTER — ANESTHESIA EVENT (OUTPATIENT)
Dept: MEDSURG UNIT | Facility: HOSPITAL | Age: 72
DRG: 177 | End: 2022-06-13
Payer: MEDICARE

## 2022-06-13 PROBLEM — G89.29 CHRONIC LOW BACK PAIN: Status: ACTIVE | Noted: 2022-06-13

## 2022-06-13 PROBLEM — M54.50 CHRONIC LOW BACK PAIN: Status: ACTIVE | Noted: 2022-06-13

## 2022-06-13 LAB
ALBUMIN SERPL-MCNC: 1.4 G/DL (ref 3.4–5)
ANION GAP SERPL CALCULATED.3IONS-SCNC: 5 MMOL/L (ref 3–14)
BUN SERPL-MCNC: 4 MG/DL (ref 7–30)
CALCIUM SERPL-MCNC: 7.9 MG/DL (ref 8.5–10.1)
CHLORIDE BLD-SCNC: 107 MMOL/L (ref 94–109)
CO2 SERPL-SCNC: 28 MMOL/L (ref 20–32)
CREAT SERPL-MCNC: 0.3 MG/DL (ref 0.52–1.04)
ERYTHROCYTE [DISTWIDTH] IN BLOOD BY AUTOMATED COUNT: 15.2 % (ref 10–15)
GFR SERPL CREATININE-BSD FRML MDRD: >90 ML/MIN/1.73M2
GLUCOSE BLD-MCNC: 88 MG/DL (ref 70–99)
HCT VFR BLD AUTO: 25.2 % (ref 35–47)
HGB BLD-MCNC: 8.5 G/DL (ref 11.7–15.7)
MAGNESIUM SERPL-MCNC: 1.8 MG/DL (ref 1.6–2.3)
MCH RBC QN AUTO: 31.6 PG (ref 26.5–33)
MCHC RBC AUTO-ENTMCNC: 33.7 G/DL (ref 31.5–36.5)
MCV RBC AUTO: 94 FL (ref 78–100)
PHOSPHATE SERPL-MCNC: 2.5 MG/DL (ref 2.5–4.5)
PLATELET # BLD AUTO: 426 10E3/UL (ref 150–450)
POTASSIUM BLD-SCNC: 3.7 MMOL/L (ref 3.4–5.3)
RBC # BLD AUTO: 2.69 10E6/UL (ref 3.8–5.2)
SODIUM SERPL-SCNC: 140 MMOL/L (ref 133–144)
WBC # BLD AUTO: 8.3 10E3/UL (ref 4–11)

## 2022-06-13 PROCEDURE — 370N000003 HC ANESTHESIA WARD SERVICE: Performed by: NURSE ANESTHETIST, CERTIFIED REGISTERED

## 2022-06-13 PROCEDURE — 250N000013 HC RX MED GY IP 250 OP 250 PS 637: Performed by: NURSE PRACTITIONER

## 2022-06-13 PROCEDURE — 250N000013 HC RX MED GY IP 250 OP 250 PS 637: Performed by: INTERNAL MEDICINE

## 2022-06-13 PROCEDURE — 36415 COLL VENOUS BLD VENIPUNCTURE: CPT | Performed by: INTERNAL MEDICINE

## 2022-06-13 PROCEDURE — 250N000011 HC RX IP 250 OP 636: Performed by: INTERNAL MEDICINE

## 2022-06-13 PROCEDURE — 120N000001 HC R&B MED SURG/OB

## 2022-06-13 PROCEDURE — 82040 ASSAY OF SERUM ALBUMIN: CPT | Performed by: INTERNAL MEDICINE

## 2022-06-13 PROCEDURE — 99232 SBSQ HOSP IP/OBS MODERATE 35: CPT | Performed by: NURSE PRACTITIONER

## 2022-06-13 PROCEDURE — 99231 SBSQ HOSP IP/OBS SF/LOW 25: CPT | Performed by: NURSE PRACTITIONER

## 2022-06-13 PROCEDURE — 97530 THERAPEUTIC ACTIVITIES: CPT | Mod: GP,CQ

## 2022-06-13 PROCEDURE — 85018 HEMOGLOBIN: CPT | Performed by: INTERNAL MEDICINE

## 2022-06-13 PROCEDURE — 97110 THERAPEUTIC EXERCISES: CPT | Mod: CQ,GP

## 2022-06-13 PROCEDURE — 83735 ASSAY OF MAGNESIUM: CPT | Performed by: INTERNAL MEDICINE

## 2022-06-13 PROCEDURE — 85041 AUTOMATED RBC COUNT: CPT | Performed by: INTERNAL MEDICINE

## 2022-06-13 RX ORDER — FLUCONAZOLE 100 MG/1
100 TABLET ORAL
Status: DISCONTINUED | OUTPATIENT
Start: 2022-06-13 | End: 2022-06-21 | Stop reason: HOSPADM

## 2022-06-13 RX ORDER — BUPROPION HYDROCHLORIDE 150 MG/1
150 TABLET ORAL DAILY
Status: DISCONTINUED | OUTPATIENT
Start: 2022-06-14 | End: 2022-06-21 | Stop reason: HOSPADM

## 2022-06-13 RX ADMIN — CEFDINIR 300 MG: 300 CAPSULE ORAL at 20:09

## 2022-06-13 RX ADMIN — CLONAZEPAM 0.5 MG: 0.5 TABLET ORAL at 11:25

## 2022-06-13 RX ADMIN — FLUCONAZOLE 100 MG: 100 TABLET ORAL at 16:58

## 2022-06-13 RX ADMIN — CLONAZEPAM 0.5 MG: 0.5 TABLET ORAL at 20:25

## 2022-06-13 RX ADMIN — ATORVASTATIN CALCIUM 40 MG: 40 TABLET, FILM COATED ORAL at 20:09

## 2022-06-13 RX ADMIN — POTASSIUM CHLORIDE 20 MEQ: 750 CAPSULE, EXTENDED RELEASE ORAL at 20:08

## 2022-06-13 RX ADMIN — MICONAZOLE NITRATE: 2 POWDER TOPICAL at 20:11

## 2022-06-13 RX ADMIN — POTASSIUM CHLORIDE 20 MEQ: 750 CAPSULE, EXTENDED RELEASE ORAL at 11:12

## 2022-06-13 RX ADMIN — CEFDINIR 300 MG: 300 CAPSULE ORAL at 11:12

## 2022-06-13 RX ADMIN — ENOXAPARIN SODIUM 40 MG: 40 INJECTION SUBCUTANEOUS at 20:11

## 2022-06-13 ASSESSMENT — ACTIVITIES OF DAILY LIVING (ADL)
ADLS_ACUITY_SCORE: 35
ADLS_ACUITY_SCORE: 34
ADLS_ACUITY_SCORE: 43
ADLS_ACUITY_SCORE: 34
ADLS_ACUITY_SCORE: 47
ADLS_ACUITY_SCORE: 39
ADLS_ACUITY_SCORE: 34
ADLS_ACUITY_SCORE: 35
ADLS_ACUITY_SCORE: 34
ADLS_ACUITY_SCORE: 34
ADLS_ACUITY_SCORE: 47
ADLS_ACUITY_SCORE: 47

## 2022-06-13 NOTE — ANESTHESIA POSTPROCEDURE EVALUATION
Patient: Raquel Sanchez    Procedure: * No procedures listed *       Anesthesia Type:  No value filed.    Note:  Anesthesia Post Evaluation    Last vitals:  Vitals:    06/13/22 1359 06/13/22 1400 06/13/22 1700   BP:  112/63 106/60   Pulse:  98 104   Resp: 22 22   Temp: 99  F (37.2  C)  99.1  F (37.3  C)   SpO2:  95% 92%       Electronically Signed By: OLGA Luna CRNA  June 13, 2022  5:17 PM

## 2022-06-13 NOTE — PLAN OF CARE
"Goal Outcome Evaluation:    Most recent vitals: /55 (BP Location: Left arm)   Pulse 106   Temp 97.6  F (36.4  C) (Tympanic)   Resp 18   Ht 1.575 m (5' 2\")   Wt 58.1 kg (128 lb 1.4 oz)   SpO2 97%   BMI 23.43 kg/m      IVF:  Saline Locked  ABX:  Omnicef PO    Comments:      A/O, calm, cooperative, but quiet.  VS stable, afebrile, and denies pain.  Lungs diminished throughout w/ productive, congested cough, pt on 1 L O2 (no complaints of dyspnea/SOB).  Barrier cream on excoriated perineum, buttocks.  Jain in place and intact for wound healing.     Face to face report given with opportunity to observe patient.    Report given to LON Pham RN   6/13/2022  7:31 AM        "
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
## 2022-06-13 NOTE — PROGRESS NOTES
[facility]    Hospitalist Progress Note    Date of Service (when I saw the patient): 06/13/2022    Assessment & Plan       Generalized muscle weakness: Initially there was concern for new neurological cause of weakness as it was worse in lower extremities, particularly the left lower extremity. However, MRI was done on entire spine that showed no acute changes. She does have degenerative anterolisthesis of L4 relative to L5 with severe bilateral facet joint degenerative change. Anterolisthesis results in uncovering of the posterior margins of the disc, mildly narrowing the central canal and moderately narrowing the lateral recesses with slight mass effect upon the intrathecal andexiting L5 nerve roots, worse on the left. Today she was able to stand with minimal assist and sit in the chair. She denies any back pain. Continue PT/OT.      Acute cystitis without hematuria: UC positive for aeurococcus, klebsiella, e. Coli. Covered by the Rocephin started on arrival and now transitioned to Omnicef which will cover all 3.       COVID-19 infection: Positive on arrival. She did present with some hypoxia. However, no cough, no fevers despite UTI. Supportive cares.       Acute respiratory failure with hypoxia: Mild. Present on arrival. Required 1 liter of oxygen on arrival, then increased to 2-3. However she was successfully weaned off today. Due to prolonged period of inactivity causing atelectasis vs due to bilateral effusions. She has not fevers, no leukocytosis so bacterial pneumonia unlikely. CT chest does show effusions and LLL atelectasis vs pneumonia.       Severe malnutrition (H): Recent weight loss, very poor oral intake since arrival. Encouraging oral intake, supplements.       Chronic low back pain: Declined having any pain.       Tobacco abuse:Chronic, nicotine replacement.       Alcohol use: Chronic      DVT Prophylaxis: Enoxaparin (Lovenox) SQ  Code Status: Full Code    Disposition: Expected  discharge in 2-3 days once placement found.    Rani Bundy CNP    Interval History    Alert and conversant. Denies chest pain, dyspnea, abdominal pain.    -Data reviewed today: I reviewed all new labs and imaging results over the last 24 hours.     Physical Exam   Temp: 99  F (37.2  C) Temp src: Tympanic BP: 112/63 Pulse: 98   Resp: 22 SpO2: 95 % O2 Device: None (Room air) Oxygen Delivery: 1 LPM  Vitals:    06/11/22 0417 06/12/22 0513 06/13/22 0500   Weight: 60.3 kg (132 lb 15 oz) 58.1 kg (128 lb 1.4 oz) 58.4 kg (128 lb 12 oz)     Vital Signs with Ranges  Temp:  [97.6  F (36.4  C)-99  F (37.2  C)] 99  F (37.2  C)  Pulse:  [] 98  Resp:  [16-22] 22  BP: (102-115)/(55-76) 112/63  SpO2:  [89 %-99 %] 95 %  I/O last 3 completed shifts:  In: 480 [P.O.:480]  Out: 2425 [Urine:2425]    Peripheral IV 06/11/22 Left Upper arm (Active)   Site Assessment WDL except;Red 06/13/22 0128   Line Status Saline locked 06/13/22 0128   Phlebitis Scale 1-->erythema at access site with or without pain 06/13/22 0128   Infiltration Scale 0 06/13/22 0128   Number of days: 2       Wound Coccyx (Active)   Wound Bed Moist;Red 06/13/22 1136   Anahy-wound Assessment Erythema;Excoriated 06/13/22 1136   Drainage Amount None 06/13/22 1136   Wound Care/Cleansing Barrier applied  06/13/22 1136   Dressing Open to air 06/13/22 1136   Number of days: 6     Line/device assessment(s) completed for medical necessity    Constitutional: Awake,alert, no acute distress  Respiratory: Course crackles bilateral bases, no wheezes, rhonchi.  Cardiovascular: HRR, no murmurs, rubs,thrills.   GI: Soft,nontender,bowel sounds positive.   Skin/Integumen: No rashes, open areas or unusual bruises.       Medications       [Held by provider] amLODIPine  10 mg Oral Daily     atorvastatin  40 mg Oral QPM     [START ON 6/14/2022] buPROPion  150 mg Oral Daily     cefdinir  300 mg Oral BID     enoxaparin ANTICOAGULANT  40 mg Subcutaneous Q24H     fluconazole  100 mg Oral  Q72H     miconazole   Topical BID     nicotine  1 patch Transdermal Daily     nicotine   Transdermal Q8H     potassium chloride ER  20 mEq Oral BID     sodium chloride (PF)  3 mL Intracatheter Q8H     sodium chloride (PF)  5 mL Intravenous Q8H       Data   Recent Labs   Lab 06/13/22  0607 06/12/22  0628 06/11/22  0613 06/09/22  0726 06/08/22  0535 06/07/22  2312 06/07/22  1650   WBC 8.3 7.9 5.4   < > 9.0  --  11.3*   HGB 8.5* 9.1* 9.3*   < > 11.3*  --  12.7   MCV 94 92 93   < > 95  --  94    404 425   < > 575*  --  630*   INR  --   --   --   --   --   --  0.97    140 140   < > 135  --  135   POTASSIUM 3.7 3.5 3.6   < > 3.5   < > 4.1   CHLORIDE 107 108 110*   < > 104  --  104   CO2 28 28 27   < > 23  --  21   BUN 4* 2* 2*   < > 3*  --  3*   CR 0.30* 0.32* 0.27*   < > 0.33*   < > 0.43*   ANIONGAP 5 4 3   < > 8  --  10   ROSA 7.9* 7.7* 7.6*   < > 7.4*  --  7.9*   GLC 88 93 90   < > 88  --  116*   ALBUMIN 1.4* 1.3* 1.3*   < > 1.5*  --  1.6*   PROTTOTAL  --   --   --   --  5.8*  --  6.5*   BILITOTAL  --   --   --   --  0.5  --  0.6   ALKPHOS  --   --   --   --  318*  --  351*   ALT  --   --   --   --  18  --  18   AST  --   --   --   --  40  --  38   LIPASE  --   --   --   --   --   --  85    < > = values in this interval not displayed.       No results found for this or any previous visit (from the past 24 hour(s)).

## 2022-06-13 NOTE — PROGRESS NOTES
continues to pursue skilled nursing facility placement for a short term rehab stay for patient. Facilities unable to admit patient during initial covid quarantine period.

## 2022-06-13 NOTE — PLAN OF CARE
"Reason for hospital stay:  Covid    Most recent vitals: /66   Pulse 106   Temp 98.3  F (36.8  C) (Tympanic)   Resp 20   Ht 1.575 m (5' 2\")   Wt 58.1 kg (128 lb 1.4 oz)   SpO2 95%   BMI 23.43 kg/m      Pain Management:  Denied any pain this evening.     LOC:  A+O x4 - calls appropriately and makes needs known. Continues to be tearful at times and states \"have been feeling depressed.\"     Cardiac:  WDL    Respiratory:  Lungs diminished throughout with frequent congested cough. O2 weaned to 1 LPM while maintaining sats 95%. Denies any dyspnea or shortness of breath.     GI:  Continues to have poor appetite and refusing t order anything for meals. Offered numerous food and snack choices but patient declined. Sipping on fluids.    :  Jain patent and draining adequate urine.    Skin Issues: See flow sheets. Jain in place for wound healing - barrier cream applied with repositioning and cares.     IVF:  SL    ABX:  Continues on PO Omnicef.       Face to face report given with opportunity to observe patient.    Report given to Arnol DALEY RN / Acacia Sanchez RN   6/12/2022  11:54 PM      "

## 2022-06-13 NOTE — PROGRESS NOTES
"INPATIENT ROUNDING NOTE  6/12/2022    Patient: Raquel Sanchez    Physician of Record: Bishnu Vincent MD consulting surgeon    Admitting diagnosis: Weakness [R53.1]  Esophagitis [K20.90]  Pleural effusion [J90]  Generalized muscle weakness [M62.81]  Failure to thrive in adult [R62.7]  Decubitus ulcer of buttock [L89.309]  COVID-19 [U07.1]    Reason for Admission: weakness    Length of stay: 5    Current Diet: general    CURRENT MEDICATIONS:  Continuous Medications:  Current Facility-Administered Medications   Medication Last Rate       Scheduled Medications:  Current Facility-Administered Medications   Medication Dose Route Frequency     [Held by provider] amLODIPine  10 mg Oral Daily     [Held by provider] atorvastatin  40 mg Oral QPM     cefdinir  300 mg Oral BID     enoxaparin ANTICOAGULANT  40 mg Subcutaneous Q24H     nicotine  1 patch Transdermal Daily     nicotine   Transdermal Q8H     potassium chloride ER  20 mEq Oral BID     sodium chloride (PF)  3 mL Intracatheter Q8H     sodium chloride (PF)  5 mL Intravenous Q8H       PRN Medications:  Current Facility-Administered Medications   Medication Dose Route Frequency     acetaminophen  650 mg Rectal Q6H PRN     [Held by provider] acetaminophen  325 mg Oral Q6H PRN     clonazePAM  0.5 mg Oral BID PRN     lidocaine 4%   Topical Q1H PRN     lidocaine (buffered or not buffered)  0.1-1 mL Other Q1H PRN     LORazepam  0.5 mg Intravenous Q4H PRN     prochlorperazine  5 mg Intravenous Q6H PRN    Or     prochlorperazine  5 mg Oral Q6H PRN    Or     prochlorperazine  12.5 mg Rectal Q12H PRN     sodium chloride (PF)  3 mL Intracatheter q1 min prn       SUBJECTIVE:  She states she has minimal discomfort from redness of labia and inner upper thighs after ojeda placed to prevent incontinence.    PHYSICAL EXAM:   Vital signs: /63 (BP Location: Left arm)   Pulse 106   Temp 98.8  F (37.1  C) (Tympanic)   Resp 20   Ht 1.575 m (5' 2\")   Wt 58.1 kg (128 lb 1.4 oz)   " SpO2 99%   BMI 23.43 kg/m     Weight: [unfilled]   BMI: Body mass index is 23.43 kg/m .   Patient is a well developed well nourished and in NAD and is afebrile with VSS and answering questions appropriately with normal affect.  Patient is alert and oriented x3.    HEENT:  normocephalic with normal external ears and nonicteric, oral mucosa moist and dentition normal for age, trachea midline with no large masses vissualized  Heart:  RRR, no gallops murmurs or rubs, normal PMI with no thrills  Lungs: normal  excursions, no loud audible wheezing, no subcutaneous emphysema  Abdomen:  nondistended, no gross hepatosplenomegaly, no masses, no rigidity or rebound  Neuro:  nonfocal, MERAZ, grossly normal sensation  Musculoskeletal:  good muscle tone, no fasciculations  Skin:  pink  warm and dry with no rashes or ecchymosis  Vascular:  good radial pulses, no ulcerations, less than 2 second capillary  refill  in hands  :  Deferred.  Just mild erythema/redness at labia with ojeda now in place    INPUT/OUTPUT:      Intake/Output Summary (Last 24 hours) at 6/12/2022 2122  Last data filed at 6/12/2022 1745  Gross per 24 hour   Intake 1680 ml   Output 3225 ml   Net -1545 ml       I/O last 3 completed shifts:  In: 1200 [P.O.:1200]  Out: 3225 [Urine:3225]    LABS:    Last CBC Rrsults:   Recent Labs   Lab Test 06/12/22  0628 06/11/22  0613 06/10/22  0625   WBC 7.9 5.4 10.2   RBC 2.89* 2.94* 3.10*   HGB 9.1* 9.3* 9.8*   HCT 26.7* 27.2* 31.7*   MCV 92 93 102*   MCH 31.5 31.6 31.6   MCHC 34.1 34.2 30.9*   RDW 15.0 14.6 15.4*    425 442       Last Comprehensive Metabolic panel:  Recent Labs   Lab Test 06/12/22  0628 06/11/22  0613 06/10/22  0625 06/09/22  0726 06/08/22  0535 06/07/22  2312 06/07/22  1650 05/12/22  0919 11/15/21  1101    140 141   < > 135  --  135   < > 138   POTASSIUM 3.5 3.6 4.3   < > 3.5   < > 4.1   < > 3.8   CHLORIDE 108 110* 113*   < > 104  --  104   < > 106   CO2 28 27 23   < > 23  --  21   < > 25    ANIONGAP 4 3 5   < > 8  --  10   < > 7   GLC 93 90 78   < > 88  --  116*   < > 93   BUN 2* 2* 2*   < > 3*  --  3*   < > 4*   CR 0.32* 0.27* 0.30*   < > 0.33*   < > 0.43*   < > 0.60   GFRESTIMATED >90 >90 >90   < > >90   < > >90   < > >90   ROSA 7.7* 7.6* 7.5*   < > 7.4*  --  7.9*   < > 8.9   BILITOTAL  --   --   --   --  0.5  --  0.6  --  0.3   ALKPHOS  --   --   --   --  318*  --  351*  --  60   ALT  --   --   --   --  18  --  18  --  10   AST  --   --   --   --  40  --  38  --  13    < > = values in this interval not displayed.       Recent Labs   Lab Test 06/12/22  0628 06/11/22  0613 06/10/22  0625   MAG 1.6 1.6 1.6   ALBUMIN 1.3* 1.3* 1.2*   PHOS 2.6 2.7 2.7           ASSESSMENT:    72 year old female admitted for Weakness [R53.1]  Esophagitis [K20.90]  Pleural effusion [J90]  Generalized muscle weakness [M62.81]  Failure to thrive in adult [R62.7]  Decubitus ulcer of buttock [L89.309]  COVID-19 [U07.1].  Here for weakness. Hospital day 5.         PLAN: Grade I decubitus with mild erythema of upper inner thigh and labia much improved with zinc oxide protective cream and ojeda placement.

## 2022-06-13 NOTE — PROGRESS NOTES
"CLINICAL NUTRITION SERVICES  -  REASSESSMENT NOTE    Raquel Hamri     72 yof admitted for weakness. COVID+. Pt has a past medical hx including tobacco and alcohol abuse, HLD, HTN. Poor appetite/intake. Poor dentition, altered textured diet at home. 34lb weight loss noted since November 2022. Weight appears stable since last review. Last BM documented 6/12. Noted pt has 3 pin sized open areas to coccyx.    Diet Order: Pureed diet (level 4), thin liquids (level 0)  Intake: 8 meals with 25% intake or less    Height: 5' 2\"  Weight: 128 lbs 11.98 oz  Body mass index is 23.55 kg/m .  Weight Status:  Normal BMI  Weight History: admit weight- 58.3kg. 23% weight loss in 7 months  Wt Readings from Last 10 Encounters:   06/08/22 58.3 kg (128 lb 8.5 oz)   05/12/22 65.3 kg (144 lb)   12/15/21 73.5 kg (162 lb)   11/15/21 75.3 kg (166 lb)   09/01/20 65.3 kg (144 lb)   12/09/19 56.7 kg (125 lb)   07/31/18 59.4 kg (131 lb)   01/10/18 57.2 kg (126 lb 3.2 oz)   11/29/17 57.6 kg (127 lb)   10/09/17 59 kg (130 lb)      Weight used to calculate needs: 58.3kg  Estimated Energy Needs: 9238-2698 kcals (25-30 Kcal/Kg)   Estimated Protein Needs: 60-70 grams protein (1-1.2 g pro/Kg)  Estimated Fluid Needs: 4465-0993  mL (1 mL/Kcal)     Malnutrition Diagnosis: Severe malnutrition  In Context of:  Chronic illness or disease      NUTRITION RECOMMENDATIONS  - Encourage frequent small meals/snacks  - Offer Ensure with meals  - may benefit from a daily multivitamin/mineral supplement     MONITORING AND EVALUATION:  RD will monitor intake, weight, labs       "

## 2022-06-13 NOTE — PROGRESS NOTES
"INPATIENT ROUNDING NOTE  6/13/2022    Patient: Raquel Sanchez    Physician of Record: Hospitalist Service    Admitting diagnosis: Weakness [R53.1]  Esophagitis [K20.90]  Pleural effusion [J90]  Generalized muscle weakness [M62.81]  Failure to thrive in adult [R62.7]  Decubitus ulcer of buttock [L89.309]  COVID-19 [U07.1]    Length of stay: 6    Patient seen for wound care.     CURRENT MEDICATIONS:  Continuous Medications:  Current Facility-Administered Medications   Medication Last Rate       Scheduled Medications:  Current Facility-Administered Medications   Medication Dose Route Frequency     [Held by provider] amLODIPine  10 mg Oral Daily     atorvastatin  40 mg Oral QPM     cefdinir  300 mg Oral BID     enoxaparin ANTICOAGULANT  40 mg Subcutaneous Q24H     nicotine  1 patch Transdermal Daily     nicotine   Transdermal Q8H     potassium chloride ER  20 mEq Oral BID     sodium chloride (PF)  3 mL Intracatheter Q8H     sodium chloride (PF)  5 mL Intravenous Q8H       PRN Medications:  Current Facility-Administered Medications   Medication Dose Route Frequency     acetaminophen  650 mg Rectal Q6H PRN     [Held by provider] acetaminophen  325 mg Oral Q6H PRN     clonazePAM  0.5 mg Oral BID PRN     lidocaine 4%   Topical Q1H PRN     lidocaine (buffered or not buffered)  0.1-1 mL Other Q1H PRN     LORazepam  0.5 mg Intravenous Q4H PRN     prochlorperazine  5 mg Intravenous Q6H PRN    Or     prochlorperazine  5 mg Oral Q6H PRN    Or     prochlorperazine  12.5 mg Rectal Q12H PRN     sodium chloride (PF)  3 mL Intracatheter q1 min prn       SUBJECTIVE:   Nausea: No. Vomiting: No. Pain control: good. Tolerating current diet: Yes.     PHYSICAL EXAM:   Vital signs: /76 (BP Location: Right arm, Patient Position: Sitting)   Pulse (!) 138   Temp 99  F (37.2  C) (Tympanic)   Resp 22   Ht 1.575 m (5' 2\")   Wt 58.4 kg (128 lb 12 oz)   SpO2 (!) 90%   BMI 23.55 kg/m     BMI: Body mass index is 23.55 kg/m .   General: " Normal, healthy, cooperative, in no acute distress, alert   Lungs: respirations are non-labored   Abdominal: non-distended   Buttock: dermatitis is resolved.    Right antecubital arm: Bruising is resolved   Left upper lateral thigh scabbed noted   Bilateral inner, upper thigh skin irritation consistent with a yeast infection is noted   Neurological: without deficit    INPUT/OUTPUT:      Intake/Output Summary (Last 24 hours) at 6/13/2022 1417  Last data filed at 6/13/2022 1030  Gross per 24 hour   Intake 480 ml   Output 2425 ml   Net -1945 ml       I/O last 3 completed shifts:  In: 1680 [P.O.:1680]  Out: 2925 [Urine:2925]    LABS:    Last CBC Rrsults:   Recent Labs   Lab Test 06/13/22  0607 06/12/22  0628 06/11/22  0613   WBC 8.3 7.9 5.4   RBC 2.69* 2.89* 2.94*   HGB 8.5* 9.1* 9.3*   HCT 25.2* 26.7* 27.2*   MCV 94 92 93   MCH 31.6 31.5 31.6   MCHC 33.7 34.1 34.2   RDW 15.2* 15.0 14.6    404 425       Last Comprehensive Metabolic panel:  Recent Labs   Lab Test 06/13/22  0607 06/12/22  0628 06/11/22  0613 06/09/22  0726 06/08/22  0535 06/07/22  2312 06/07/22  1650 05/12/22  0919 11/15/21  1101    140 140   < > 135  --  135   < > 138   POTASSIUM 3.7 3.5 3.6   < > 3.5   < > 4.1   < > 3.8   CHLORIDE 107 108 110*   < > 104  --  104   < > 106   CO2 28 28 27   < > 23  --  21   < > 25   ANIONGAP 5 4 3   < > 8  --  10   < > 7   GLC 88 93 90   < > 88  --  116*   < > 93   BUN 4* 2* 2*   < > 3*  --  3*   < > 4*   CR 0.30* 0.32* 0.27*   < > 0.33*   < > 0.43*   < > 0.60   GFRESTIMATED >90 >90 >90   < > >90   < > >90   < > >90   ROSA 7.9* 7.7* 7.6*   < > 7.4*  --  7.9*   < > 8.9   BILITOTAL  --   --   --   --  0.5  --  0.6  --  0.3   ALKPHOS  --   --   --   --  318*  --  351*  --  60   ALT  --   --   --   --  18  --  18  --  10   AST  --   --   --   --  40  --  38  --  13    < > = values in this interval not displayed.       Recent Labs   Lab Test 06/13/22  0607 06/12/22  0628 06/11/22  0613   MAG 1.8 1.6 1.6   ALBUMIN  1.4* 1.3* 1.3*   PHOS 2.5 2.6 2.7         ASSESSMENT:    72 year old female admitted for Weakness [R53.1]  Esophagitis [K20.90]  Pleural effusion [J90]  Generalized muscle weakness [M62.81]  Failure to thrive in adult [R62.7]  Decubitus ulcer of buttock [L89.309]  COVID-19 [U07.1]. Hospital day 6.  Seen for wound care and yeast infection    PLAN:   Continue current care of buttock  Will treat areas of inner thigh with topical powder

## 2022-06-13 NOTE — PROGRESS NOTES
06/13/22 1138   Signing Clinician's Name / Credentials   Signing clinician's name / credentials Mckenzie Kurtz PTA   Quick Adds   Rehab Discipline PT   PT Assistant Visit Number 1   Therapeutic Activity   Minutes of Treatment 15 minutes   Symptoms Noted During/After Treatment Fatigue;Shortness of breath   Treatment Detail Pt was in bed with bed alarm on but was willing to participate in PT this morning. Pt was SBA1 with supine to sit using the siderail. Sit to stand minA2 pivoted into the recliner with cues for correct hand placement. Pt was taking short steps due to not being sure of her legs. sit to stand 35 seconds HHA2 minA2 holding onto pt.   Therapeutic Exercise   Minutes of Treatment 10   Symptoms Noted During/After Treatment fatigue;shortness of breath   Treatment Detail Seated LE exercises 10 reps: LAQ'S,marching, ankle pumps, VMO.   PT Discharge Planning   PT Discharge Recommendation (DC Rec) Transitional Care Facility   PT Rationale for DC Rec due to current functional status   PT Brief overview of current status supine to sit SBA1, sit to stand MinA2, pivot transferred into the recliner with clip alarm applied.   Additional Documentation   PT Plan Progress stength, functional mobility and activity tolerance   Total Session Time   Total Session Time (minutes) 25 minutes

## 2022-06-14 ENCOUNTER — APPOINTMENT (OUTPATIENT)
Dept: SPEECH THERAPY | Facility: HOSPITAL | Age: 72
DRG: 177 | End: 2022-06-14
Attending: NURSE PRACTITIONER
Payer: MEDICARE

## 2022-06-14 LAB
ANION GAP SERPL CALCULATED.3IONS-SCNC: 4 MMOL/L (ref 3–14)
BACTERIA UR CULT: ABNORMAL
BUN SERPL-MCNC: 6 MG/DL (ref 7–30)
CALCIUM SERPL-MCNC: 8 MG/DL (ref 8.5–10.1)
CHLORIDE BLD-SCNC: 108 MMOL/L (ref 94–109)
CO2 SERPL-SCNC: 26 MMOL/L (ref 20–32)
CREAT SERPL-MCNC: 0.29 MG/DL (ref 0.52–1.04)
ERYTHROCYTE [DISTWIDTH] IN BLOOD BY AUTOMATED COUNT: 15.8 % (ref 10–15)
GFR SERPL CREATININE-BSD FRML MDRD: >90 ML/MIN/1.73M2
GLUCOSE BLD-MCNC: 96 MG/DL (ref 70–99)
HCT VFR BLD AUTO: 24.7 % (ref 35–47)
HGB BLD-MCNC: 8.2 G/DL (ref 11.7–15.7)
MCH RBC QN AUTO: 31.9 PG (ref 26.5–33)
MCHC RBC AUTO-ENTMCNC: 33.2 G/DL (ref 31.5–36.5)
MCV RBC AUTO: 96 FL (ref 78–100)
PLATELET # BLD AUTO: 376 10E3/UL (ref 150–450)
POTASSIUM BLD-SCNC: 3.9 MMOL/L (ref 3.4–5.3)
RBC # BLD AUTO: 2.57 10E6/UL (ref 3.8–5.2)
SODIUM SERPL-SCNC: 138 MMOL/L (ref 133–144)
WBC # BLD AUTO: 7.3 10E3/UL (ref 4–11)

## 2022-06-14 PROCEDURE — 120N000001 HC R&B MED SURG/OB

## 2022-06-14 PROCEDURE — 99232 SBSQ HOSP IP/OBS MODERATE 35: CPT | Performed by: NURSE PRACTITIONER

## 2022-06-14 PROCEDURE — 92610 EVALUATE SWALLOWING FUNCTION: CPT | Mod: GN

## 2022-06-14 PROCEDURE — 250N000011 HC RX IP 250 OP 636: Performed by: INTERNAL MEDICINE

## 2022-06-14 PROCEDURE — 85027 COMPLETE CBC AUTOMATED: CPT | Performed by: NURSE PRACTITIONER

## 2022-06-14 PROCEDURE — 36415 COLL VENOUS BLD VENIPUNCTURE: CPT | Performed by: NURSE PRACTITIONER

## 2022-06-14 PROCEDURE — 80048 BASIC METABOLIC PNL TOTAL CA: CPT | Performed by: NURSE PRACTITIONER

## 2022-06-14 PROCEDURE — 250N000013 HC RX MED GY IP 250 OP 250 PS 637: Performed by: INTERNAL MEDICINE

## 2022-06-14 PROCEDURE — 99231 SBSQ HOSP IP/OBS SF/LOW 25: CPT | Performed by: NURSE PRACTITIONER

## 2022-06-14 PROCEDURE — 250N000013 HC RX MED GY IP 250 OP 250 PS 637: Performed by: NURSE PRACTITIONER

## 2022-06-14 RX ADMIN — BUPROPION HYDROCHLORIDE 150 MG: 150 TABLET, FILM COATED, EXTENDED RELEASE ORAL at 08:24

## 2022-06-14 RX ADMIN — ATORVASTATIN CALCIUM 40 MG: 40 TABLET, FILM COATED ORAL at 20:10

## 2022-06-14 RX ADMIN — CEFDINIR 300 MG: 300 CAPSULE ORAL at 20:09

## 2022-06-14 RX ADMIN — CLONAZEPAM 0.5 MG: 0.5 TABLET ORAL at 20:20

## 2022-06-14 RX ADMIN — ENOXAPARIN SODIUM 40 MG: 40 INJECTION SUBCUTANEOUS at 20:10

## 2022-06-14 RX ADMIN — POTASSIUM CHLORIDE 20 MEQ: 750 CAPSULE, EXTENDED RELEASE ORAL at 20:10

## 2022-06-14 RX ADMIN — CEFDINIR 300 MG: 300 CAPSULE ORAL at 08:24

## 2022-06-14 RX ADMIN — MICONAZOLE NITRATE: 2 POWDER TOPICAL at 20:10

## 2022-06-14 RX ADMIN — POTASSIUM CHLORIDE 20 MEQ: 750 CAPSULE, EXTENDED RELEASE ORAL at 08:24

## 2022-06-14 RX ADMIN — MICONAZOLE NITRATE: 2 POWDER TOPICAL at 08:26

## 2022-06-14 ASSESSMENT — ACTIVITIES OF DAILY LIVING (ADL)
ADLS_ACUITY_SCORE: 41
ADLS_ACUITY_SCORE: 43
ADLS_ACUITY_SCORE: 39
ADLS_ACUITY_SCORE: 39
ADLS_ACUITY_SCORE: 43
ADLS_ACUITY_SCORE: 39
ADLS_ACUITY_SCORE: 43

## 2022-06-14 NOTE — PROGRESS NOTES
"   06/14/22 1100   General Information   Onset of Illness/Injury or Date of Surgery 06/07/22   Referring Physician Rani Bundy NP   Pertinent History of Current Problem Patient is a 72 year old female who was admitted on 6/7/22. Patient was previously evaluated by SLP services on 6/8/22 to rule out aspiration. Patient tolerated thin liquids and pureed (IDDSI 4) without difficulties. Patient was then discharged from skilled services due to patient reporting that pureed textures were what she preferred and that was her diet at home. New orders have been placed for clinical bedside swallow evaluation due to patient now reporting that she wants to eat \"real foods\".   Past History of Dysphagia Patient reports no concerns regarding swallowing. She states that at home she eats foods such as soups and pudding.   Type of Evaluation   Type of Evaluation Swallow Evaluation   Oral Motor   Oral Musculature anomalies present   Structural Abnormalities present   Mucosal Quality good   Dentition (Oral Motor)   Comment, Dentition (Oral Motor) Patient reports that she has dentures at home however she no longer wears them due to them fitting poorly.   Dentition (Oral Motor) significant number of missing teeth;dentition impacts chewing ability   Facial Symmetry (Oral Motor)   Facial Symmetry (Oral Motor) right side impairment   Right Side Facial Asymmetry minimal impairment   Jaw Function (Oral Motor)   Jaw Function (Oral Motor) range of motion impairment;other (see comments)  (Patient is unable to completely open her mouth due to history of jaw surgery.)   Cough/Swallow/Gag Reflex (Oral Motor)   Volitional Swallow (Oral Motor) WNL   Vocal Quality/Secretion Management (Oral Motor)   Vocal Quality (Oral Motor) WNL   Secretion Management (Oral Motor) WNL   General Swallowing Observations   Respiratory Support (General Swallowing Observations) none   Current Diet/Method of Nutritional Intake (General Swallowing Observations, NIS) " thin liquids (level 0);pureed (level 4)   Swallowing Evaluation Clinical swallow evaluation   Clinical Swallow Evaluation   Feeding Assistance set up only required   Additional evaluation(s) completed today No   Rationale for completing additional evaluation A clinical bedside swallow evaluation cannot rule out silent aspiration. If provider or other suspects that patient is silently aspirating than a video swallow study may be warranted.   Clinical Swallow Evaluation Textures Trialed thin liquids;pureed;soft & bite-sized   Clinical Swallow Eval: Thin Liquid Texture Trial   Mode of Presentation, Thin Liquids cup;self-fed   Volume of Liquid or Food Presented 4 oz.   Oral Phase of Swallow WFL   Pharyngeal Phase of Swallow intact   Diagnostic Statement No overt signs/symptoms of aspiration were observed during thin liquid trials.   Clinical Swallow Evaluation: Puree Solid Texture Trial   Mode of Presentation, Puree spoon;self-fed   Volume of Puree Presented 3 oz.   Oral Phase, Puree WFL   Pharyngeal Phase, Puree intact   Diagnostic Statement No overt signs/symptoms of aspiration were observed during pureed texture trials. Patient demonstrate clearance of bolus from oral cavity following the swallow.   Clinical Swallow Eval: Soft & Bite Sized   Mode of Presentation spoon;self-fed   Volume Presented 2 oz.   Oral Phase WFL;impaired mastication   Pharyngeal Phase intact   Diagnostic Statement No overt signs/symptoms of aspiration were observed during soft & bite sized trials. Patient was observed to demonstrate prolonged mastication at times however she was able to adequately clear bolus from oral cavity during 100% of trials.   Swallowing Recommendations   Diet Consistency Recommendations thin liquids (level 0);soft & bite-sized (level 6)   Supervision Level for Intake distant supervision needed   Mode of Delivery Recommendations bolus size, small;slow rate of intake   Postural Recommendations other (see  comments)  (sitting at 90 degrees)   Swallowing Maneuver Recommendations alternate food and liquid intake   Monitoring/Assistance Required (Eating/Swallowing) stop eating activities when fatigue is present;monitor for cough or change in vocal quality with intake   Recommended Feeding/Eating Techniques (Swallow Eval) maintain upright sitting position for eating;maintain upright posture during/after eating for 30 minutes   Medication Administration Recommendations, Swallowing (SLP) Bury in applesauce if patient is having increased difficulty with medications.   Instrumental Assessment Recommendations instrumental evaluation not recommended at this time   Comment, Swallowing Recommendations Patient is tolerating IDDSI 6 (soft and bite sized) and thin liquids.   General Therapy Interventions   Planned Therapy Interventions Dysphagia Treatment   Dysphagia treatment Instruction of safe swallow strategies;Compensatory strategies for swallowing   Clinical Impression   Criteria for Skilled Therapeutic Interventions Met (SLP Eval) Yes, treatment indicated   SLP Diagnosis   (Oral dysphagia)   Problem List (SLP) Swallowing   Functional Limitations Related to Problem List (SLP) Patient is requiring a modified diet.   Risks & Benefits of therapy have been explained evaluation/treatment results reviewed;care plan/treatment goals reviewed;risks/benefits reviewed;current/potential barriers reviewed;participants voiced agreement with care plan;participants included;patient   Clinical Impression Comments Patient was seen this AM for clinical bedside swallow evaluation.Patient seen this AM for clinical bedside swallow evaluation. She tolerated pureed (IDDSI 4) and thin liquids without demonstrating overt signs/symptoms of aspiration. Patient then trialed soft & bite sized (IDDSI 6) textures. At times, patient was observed to have prolonged mastication however she was able to clear the bolus from the oral cavity during 100% of trials.  Patient did not report any pain or difficulty with mastication during trials. Patient does have limited dentition and reports that she has dentures at home however they no longer fit. Recommend IDDSI 6 (soft and bite sized) and thin liquids. Patient continues to demonstrate large bites/sips as well as a fast rate of intake during PO trials. Patient would benefit from a 1x follow up to assess updated diet recommendations and to provide continued education regarding compensatory strategies.   SLP Discharge Planning   SLP Discharge Recommendation Transitional Care Facility   SLP Rationale for DC Rec [facility] currently requires a modified diet of IDDSI 6 (soft and bite sized) and thin liquids.Patient would likely require assistance with meal preparation and set up.   SLP Brief overview of current status  Recommend IDDSI 6 (soft & bite sized) and thin liquids. Will follow up 1x to assess diet recommendations and to provide education regarding compensatory strategies. Aspiration precautions should be followed due to patients overall weakness.   Therapy Certification   Start of Care Date 06/14/22   Certification date from 06/14/22   Certification date to 06/16/22   Medical Diagnosis Weakness    Total Evaluation Time   Total Evaluation Time (Minutes)   (15)   SLP Goals   Therapy Frequency (SLP Eval) one time eval and treatment only   SLP Predicted Duration/Target Date for Goal Attainment 06/16/22   SLP Goals Swallow   SLP: Safely tolerate diet without signs/symptoms of aspiration Soft & bite sized diet;Thin liquids;With use of compensatory swallow strategies       Recommendations for Feeding:  - Patient requires distant supervision with eating and drinking  - Patient must sit in the chair at 90 degrees (may require pillows behind back)  - Eliminate all distractions; turn off the TV  - Patient should only eat/drink when they are fully alert  - Encourage small bites and small sips  - Alternate between a bite of food and a sip  of liquid  - Check for oral pocketing  - Swallow bite of food/liquid before offering another bite/sip   - Patient must remain upright in chair at least 30-45 minutes after oral intake    NOTE: If patient becomes too tired, there are noted changes in their vocal quality/breathing (wet and gurgly), or they begin coughing frequently, stop feeding immediately and complete oral cares. SLP to re-assess swallowing

## 2022-06-14 NOTE — PROGRESS NOTES
[facility]    Hospitalist Progress Note    Date of Service (when I saw the patient): 06/14/2022    Assessment & Plan       Generalized muscle weakness: Initially there was concern for new neurological cause of weakness as it was worse in lower extremities, particularly the left lower extremity. However, MRI was done on entire spine that showed no acute changes. She does have degenerative anterolisthesis of L4 relative to L5 with severe bilateral facet joint degenerative change. Anterolisthesis results in uncovering of the posterior margins of the disc, mildly narrowing the central canal and moderately narrowing the lateral recesses with slight mass effect upon the intrathecal andexiting L5 nerve roots, worse on the left. Today she was able to stand with minimal assist and sit in the chair. She denies any back pain. Continue PT/OT.  -6/14: Continue therapy and increased activity       Acute cystitis without hematuria: UC positive for aeurococcus, klebsiella, e. Coli. Covered by the Rocephin started on arrival and now transitioned to Omnicef which will cover all 3.       COVID-19 infection: Positive on arrival. She did present with some hypoxia. However, no cough, no fevers despite UTI. Supportive cares.       Acute respiratory failure with hypoxia: Mild. Present on arrival. Required 1 liter of oxygen on arrival, then increased to 2-3. However she was successfully weaned off today. Due to prolonged period of inactivity causing atelectasis vs due to bilateral effusions. She has not fevers, no leukocytosis so bacterial pneumonia unlikely. CT chest does show effusions and LLL atelectasis vs pneumonia.   -6/14: Lung sounds improved, she did need 1 liter of oxygen overnight, unknown if this is new due to covid or chronic (most likely). Will plan on discharge with NOC oxygen.       Severe malnutrition (H): Recent weight loss, very poor oral intake since arrival. Encouraging oral intake, supplements.   -6/14: She  states she does not like pureed diet (previously requested this per speech) so will see if speech can advance her. Likely she will do fine-she does have very poor dentition and does not wear her dentures due to ill fit-but his is very chronic for her.        Chronic low back pain: Declined having any pain.       Tobacco abuse:Chronic, nicotine replacement.       Alcohol use: Chronic      DVT Prophylaxis: Enoxaparin (Lovenox) SQ  Code Status: Full Code    Disposition: Expected discharge in 2-3 days once placement found.    Rani Bundy CNP    Interval History    Alert and conversant. In better spirits today. Denies chest pain, dyspnea, abdominal pain.    -Data reviewed today: I reviewed all new labs and imaging results over the last 24 hours.     Physical Exam   Temp: 97.6  F (36.4  C) Temp src: Tympanic BP: 116/70 Pulse: 102   Resp: 16 SpO2: 95 % O2 Device: None (Room air) Oxygen Delivery: 1 LPM  Vitals:    06/12/22 0513 06/13/22 0500 06/14/22 0415   Weight: 58.1 kg (128 lb 1.4 oz) 58.4 kg (128 lb 12 oz) 57.8 kg (127 lb 6.8 oz)     Vital Signs with Ranges  Temp:  [97.5  F (36.4  C)-99.1  F (37.3  C)] 97.6  F (36.4  C)  Pulse:  [] 102  Resp:  [16-22] 16  BP: (106-116)/(59-76) 116/70  SpO2:  [88 %-95 %] 95 %  I/O last 3 completed shifts:  In: 476 [P.O.:476]  Out: 550 [Urine:550]    Peripheral IV 06/13/22 Right Hand (Active)   Site Assessment WDL 06/14/22 0040   Line Status Saline locked 06/14/22 0040   Phlebitis Scale 0-->no symptoms 06/14/22 0040   Infiltration Scale 0 06/14/22 0040   Number of days: 1       Wound Coccyx (Active)   Wound Bed Moist;Red 06/14/22 0029   Anahy-wound Assessment Erythema;Excoriated 06/14/22 0029   Drainage Amount None 06/14/22 0029   Wound Care/Cleansing Barrier applied  06/14/22 0029   Dressing Open to air 06/14/22 0029   Number of days: 7     Line/device assessment(s) completed for medical necessity    Constitutional: Awake,alert, no acute distress  Respiratory: Course crackles  right base, left base has improved significantly, no wheezes, rhonchi.  Cardiovascular: HRR, no murmurs, rubs,thrills.   GI: Soft,nontender,bowel sounds positive.   Skin/Integumen: No rashes, open areas or unusual bruises.       Medications       [Held by provider] amLODIPine  10 mg Oral Daily     atorvastatin  40 mg Oral QPM     buPROPion  150 mg Oral Daily     cefdinir  300 mg Oral BID     enoxaparin ANTICOAGULANT  40 mg Subcutaneous Q24H     fluconazole  100 mg Oral Q72H     miconazole   Topical BID     nicotine  1 patch Transdermal Daily     nicotine   Transdermal Q8H     potassium chloride ER  20 mEq Oral BID     sodium chloride (PF)  3 mL Intracatheter Q8H     sodium chloride (PF)  5 mL Intravenous Q8H       Data   Recent Labs   Lab 06/14/22  0545 06/13/22  0607 06/12/22  0628 06/09/22  0726 06/08/22  0535 06/07/22  2312 06/07/22  1650   WBC 7.3 8.3 7.9   < > 9.0  --  11.3*   HGB 8.2* 8.5* 9.1*   < > 11.3*  --  12.7   MCV 96 94 92   < > 95  --  94    426 404   < > 575*  --  630*   INR  --   --   --   --   --   --  0.97    140 140   < > 135  --  135   POTASSIUM 3.9 3.7 3.5   < > 3.5   < > 4.1   CHLORIDE 108 107 108   < > 104  --  104   CO2 26 28 28   < > 23  --  21   BUN 6* 4* 2*   < > 3*  --  3*   CR 0.29* 0.30* 0.32*   < > 0.33*   < > 0.43*   ANIONGAP 4 5 4   < > 8  --  10   ROSA 8.0* 7.9* 7.7*   < > 7.4*  --  7.9*   GLC 96 88 93   < > 88  --  116*   ALBUMIN  --  1.4* 1.3*   < > 1.5*  --  1.6*   PROTTOTAL  --   --   --   --  5.8*  --  6.5*   BILITOTAL  --   --   --   --  0.5  --  0.6   ALKPHOS  --   --   --   --  318*  --  351*   ALT  --   --   --   --  18  --  18   AST  --   --   --   --  40  --  38   LIPASE  --   --   --   --   --   --  85    < > = values in this interval not displayed.       No results found for this or any previous visit (from the past 24 hour(s)).

## 2022-06-14 NOTE — PROGRESS NOTES
"INPATIENT ROUNDING NOTE  6/14/2022    Patient: Raquel Sanchez    Physician of Record: Hospitalist Service    Admitting diagnosis: Weakness [R53.1]  Esophagitis [K20.90]  Pleural effusion [J90]  Generalized muscle weakness [M62.81]  Failure to thrive in adult [R62.7]  Decubitus ulcer of buttock [L89.309]  COVID-19 [U07.1]    Length of stay: 7    Patient seen for wound care.    CURRENT MEDICATIONS:  Continuous Medications:  Current Facility-Administered Medications   Medication Last Rate       Scheduled Medications:  Current Facility-Administered Medications   Medication Dose Route Frequency     [Held by provider] amLODIPine  10 mg Oral Daily     atorvastatin  40 mg Oral QPM     buPROPion  150 mg Oral Daily     cefdinir  300 mg Oral BID     enoxaparin ANTICOAGULANT  40 mg Subcutaneous Q24H     fluconazole  100 mg Oral Q72H     miconazole   Topical BID     nicotine  1 patch Transdermal Daily     nicotine   Transdermal Q8H     potassium chloride ER  20 mEq Oral BID     sodium chloride (PF)  3 mL Intracatheter Q8H     sodium chloride (PF)  5 mL Intravenous Q8H       PRN Medications:  Current Facility-Administered Medications   Medication Dose Route Frequency     acetaminophen  650 mg Rectal Q6H PRN     acetaminophen  325 mg Oral Q6H PRN     clonazePAM  0.5 mg Oral BID PRN     lidocaine 4%   Topical Q1H PRN     lidocaine (buffered or not buffered)  0.1-1 mL Other Q1H PRN     LORazepam  0.5 mg Intravenous Q4H PRN     prochlorperazine  5 mg Intravenous Q6H PRN    Or     prochlorperazine  5 mg Oral Q6H PRN    Or     prochlorperazine  12.5 mg Rectal Q12H PRN     sodium chloride (PF)  3 mL Intracatheter q1 min prn       SUBJECTIVE:   Nausea: No. Vomiting: No. Fever: No. Chills: No. Excessive burping: No. Flatus: Yes. BM: Yes. Pain control: good. Tolerating current diet: Yes.      PHYSICAL EXAM:   Vital signs: /70 (BP Location: Left arm)   Pulse 102   Temp 97.6  F (36.4  C) (Tympanic)   Resp 16   Ht 1.575 m (5' 2\")   " Wt 57.8 kg (127 lb 6.8 oz)   SpO2 95%   BMI 23.31 kg/m     BMI: Body mass index is 23.31 kg/m .   General: Normal, healthy, cooperative, in no acute distress, alert   Lungs: respirations are non-labored   Buttock: dermatitis is resolved.               Left upper lateral thigh scabbed noted              Bilateral inner, upper thigh skin irritation consistent with a yeast infection is noted--this is greatly improved     Neurological: without deficit    INPUT/OUTPUT:      Intake/Output Summary (Last 24 hours) at 6/14/2022 1011  Last data filed at 6/14/2022 0835  Gross per 24 hour   Intake 956 ml   Output 550 ml   Net 406 ml       I/O last 3 completed shifts:  In: 476 [P.O.:476]  Out: 550 [Urine:550]    LABS:    Last CBC Rrsults:   Recent Labs   Lab Test 06/14/22  0545 06/13/22  0607 06/12/22  0628   WBC 7.3 8.3 7.9   RBC 2.57* 2.69* 2.89*   HGB 8.2* 8.5* 9.1*   HCT 24.7* 25.2* 26.7*   MCV 96 94 92   MCH 31.9 31.6 31.5   MCHC 33.2 33.7 34.1   RDW 15.8* 15.2* 15.0    426 404       Last Comprehensive Metabolic panel:  Recent Labs   Lab Test 06/14/22  0545 06/13/22  0607 06/12/22  0628 06/09/22  0726 06/08/22  0535 06/07/22  2312 06/07/22  1650 05/12/22  0919 11/15/21  1101    140 140   < > 135  --  135   < > 138   POTASSIUM 3.9 3.7 3.5   < > 3.5   < > 4.1   < > 3.8   CHLORIDE 108 107 108   < > 104  --  104   < > 106   CO2 26 28 28   < > 23  --  21   < > 25   ANIONGAP 4 5 4   < > 8  --  10   < > 7   GLC 96 88 93   < > 88  --  116*   < > 93   BUN 6* 4* 2*   < > 3*  --  3*   < > 4*   CR 0.29* 0.30* 0.32*   < > 0.33*   < > 0.43*   < > 0.60   GFRESTIMATED >90 >90 >90   < > >90   < > >90   < > >90   ROSA 8.0* 7.9* 7.7*   < > 7.4*  --  7.9*   < > 8.9   BILITOTAL  --   --   --   --  0.5  --  0.6  --  0.3   ALKPHOS  --   --   --   --  318*  --  351*  --  60   ALT  --   --   --   --  18  --  18  --  10   AST  --   --   --   --  40  --  38  --  13    < > = values in this interval not displayed.       Recent Labs   Lab  Test 06/13/22  0607 06/12/22  0628 06/11/22  0613   MAG 1.8 1.6 1.6   ALBUMIN 1.4* 1.3* 1.3*   PHOS 2.5 2.6 2.7       ASSESSMENT:    72 year old female admitted for Weakness [R53.1]  Esophagitis [K20.90]  Pleural effusion [J90]  Generalized muscle weakness [M62.81]  Failure to thrive in adult [R62.7]  Decubitus ulcer of buttock [L89.309]  COVID-19 [U07.1].  Hospital day 7.    PLAN:   Continue current care of buttock  Will continue to treat areas of inner thigh with topical powder

## 2022-06-14 NOTE — PLAN OF CARE
Free from falls/injuries this shift. Turned Q2H while in bed. Refused to get up into chair for dinner. Drank all of boost and had small amt of dessert for dinner. Telemetry intact. IV (SL) restarted by anesthesia this shift. Inc of stool X2 and urine X3 this shift. Pt seemed surprised that she had been incontinent. Remains on room air. Prn klonopin given at 2030. Quite talkative with staff during cares. Has Scanner on thru her phone. States she enjoys listening to [email or web address] emergency calls. Was excited to see helicopter outside of her window.     Face to face report given with opportunity to observe patient.    Report given to Samantha Agarwal RN   6/13/2022  11:13 PM

## 2022-06-14 NOTE — PHARMACY
Pharmacy Antimicrobial Stewardship Assessment     Current Antimicrobial Therapy:  Anti-infectives (From now, onward)    Start     Dose/Rate Route Frequency Ordered Stop    22 1700  fluconazole (DIFLUCAN) tablet 100 mg         100 mg Oral EVERY 72 HOURS 22 1641      22 0900  cefdinir (OMNICEF) capsule 300 mg         300 mg Oral 2 TIMES DAILY 22 1224            Indication: fluconazole (skin irritation consistent with yeast infection), cefdinir (UTI)    Days of Therapy: has had 1 dose of fluconazole, day 3 of cefdinir, day 7 of abx overall     Pertinent Labs:    Recent labs: (last 7 days)     22  1650 22  0535 22  0726 22  0613 22  0628 22  0607 22  0545   WBC 11.3* 9.0   < > 5.4 7.9 8.3 7.3   LACT 1.5  --   --   --   --   --   --    CRP 16.4* 16.9*  --   --   --   --   --    PCAL 0.26*  --   --   --   --   --   --     < > = values in this interval not displayed.       Temperature:  Temp (24hrs), Av.3  F (36.8  C), Min:97.5  F (36.4  C), Max:99.1  F (37.3  C)      Culture Results:       Recommendations/Interventions:  1. None at this time.    Alma Delia Ahumada, [facility]  2022

## 2022-06-14 NOTE — PROGRESS NOTES
06/14/22 1100   Signing Clinician's Name / Credentials   Signing clinician's name / credentials Nasra Farnsworth MS CCC-SLP   Quick Adds   Rehab Discipline SLP   Quick Adds Interventions Speech Language Pathology   SLP - Dysphagia/Swallow   Minutes of Treatment   (Evaluation only)   Symptoms Noted During/After Treatment None   Treatment Detail Clinical bedside swallow evaluation completed this AM.   SLP Discharge Planning   SLP Discharge Recommendation Transitional Care Facility   SLP Rationale for DC Rec [facility] currently requires a modified diet of IDDSI 6 (soft and bite sized) and thin liquids.Patient would likely require assistance with meal preparation and set up.   SLP Brief overview of current status  Recommend IDDSI 6 (soft & bite sized) and thin liquids. No overt signs/symptoms of aspiration were observed during evaluation. Will follow up 1x to assess diet recommendations and to provide education regarding compensatory strategies. Aspiration precautions should be followed due to patients overall weakness.   Additional Documentation   SLP Plan Follow up 1x to assess diet modifications and provide education on compensatory strategies.   Total Session Time   Total Session Time (minutes)   (Evaluation only)       Recommendations for Feeding:  - Patient requires distant supervision with eating and drinking  - Patient must sit in the chair at 90 degrees (may require pillows behind back)  - Eliminate all distractions; turn off the TV  - Patient should only eat/drink when they are fully alert  - Encourage small bites and small sips  - Alternate between a bite of food and a sip of liquid  - Check for oral pocketing  - Swallow bite of food/liquid before offering another bite/sip   - Patient must remain upright in chair at least 30-45 minutes after oral intake    NOTE: If patient becomes too tired, there are noted changes in their vocal quality/breathing (wet and gurgly), or they begin coughing frequently, stop  feeding immediately and complete oral cares. SLP to re-assess swallowing

## 2022-06-14 NOTE — PLAN OF CARE
"Goal Outcome Evaluation:       /64 (BP Location: Left arm, Patient Position: Semi-Gaston's)   Pulse 116   Temp 98  F (36.7  C) (Tympanic)   Resp 18   Ht 1.575 m (5' 2\")   Wt 57.8 kg (127 lb 6.8 oz)   SpO2 92%   BMI 23.31 kg/m       Pt is A&O, makes needs known. Irritable and uncooperative today. Refused to get up into the chair, refused PT. VSS, afebrile. Denies pain. Remains on RA, O2 sats low 90's. LS diminished. HRR. BS active. Incontinent of urine and stool. Poor appetite.  Excoriation to buttocks and inner thighs, barrier cream applied. Turned and repositioned q2H. IV SL. Call light within reach, alarms active.      Face to face report given with opportunity to observe patient.    Report given to LON Jaramillo RN   6/14/2022  3:29 PM                  "

## 2022-06-14 NOTE — PROGRESS NOTES
06/14/22 1400   Appointment Canceled   Appointment Canceled Patient declined   Cancel Comments Pt resting in bed when approached for PT. Pt adamant that she is not getting up today. Provided encouragement and education regarding risks of not gett up however pt still refused. Nursing was informed. Will attempt again when able   Signing Clinician's Name / Credentials   Signing clinician's name / credentials Ana Lombardo DPT   Quick Adds   Rehab Discipline PT

## 2022-06-14 NOTE — PLAN OF CARE
Pt A&Ox3, slept off and on during night. Denies pain. Placed on 1 LPM NC for short period of time for oxygen sats 86-88% on room air. Oxygen weaned off again by morning sating 90-92%. VS otherwise stable. Lung sounds diminished with fine crackles in bases. Repositioned Q2H throughout night. Incontinent of B&B. Barrier cream applied to excoriation. Alarms on, Isolation maintained, call light within reach and pt calls appropriately.     Face to face report given with opportunity to observe patient.    Report given to Heike Redman RN   6/14/2022  7:22 AM

## 2022-06-15 ENCOUNTER — APPOINTMENT (OUTPATIENT)
Dept: PHYSICAL THERAPY | Facility: HOSPITAL | Age: 72
DRG: 177 | End: 2022-06-15
Payer: MEDICARE

## 2022-06-15 LAB
ANION GAP SERPL CALCULATED.3IONS-SCNC: 5 MMOL/L (ref 3–14)
BUN SERPL-MCNC: 6 MG/DL (ref 7–30)
CALCIUM SERPL-MCNC: 8.2 MG/DL (ref 8.5–10.1)
CHLORIDE BLD-SCNC: 108 MMOL/L (ref 94–109)
CO2 SERPL-SCNC: 26 MMOL/L (ref 20–32)
CREAT SERPL-MCNC: 0.32 MG/DL (ref 0.52–1.04)
ERYTHROCYTE [DISTWIDTH] IN BLOOD BY AUTOMATED COUNT: 15.8 % (ref 10–15)
GFR SERPL CREATININE-BSD FRML MDRD: >90 ML/MIN/1.73M2
GLUCOSE BLD-MCNC: 88 MG/DL (ref 70–99)
HCT VFR BLD AUTO: 25.8 % (ref 35–47)
HGB BLD-MCNC: 8.6 G/DL (ref 11.7–15.7)
MCH RBC QN AUTO: 31.7 PG (ref 26.5–33)
MCHC RBC AUTO-ENTMCNC: 33.3 G/DL (ref 31.5–36.5)
MCV RBC AUTO: 95 FL (ref 78–100)
PLATELET # BLD AUTO: 440 10E3/UL (ref 150–450)
POTASSIUM BLD-SCNC: 3.8 MMOL/L (ref 3.4–5.3)
RBC # BLD AUTO: 2.71 10E6/UL (ref 3.8–5.2)
SODIUM SERPL-SCNC: 139 MMOL/L (ref 133–144)
WBC # BLD AUTO: 6.3 10E3/UL (ref 4–11)

## 2022-06-15 PROCEDURE — 250N000013 HC RX MED GY IP 250 OP 250 PS 637: Performed by: INTERNAL MEDICINE

## 2022-06-15 PROCEDURE — 97530 THERAPEUTIC ACTIVITIES: CPT | Mod: GP | Performed by: PHYSICAL THERAPIST

## 2022-06-15 PROCEDURE — 99232 SBSQ HOSP IP/OBS MODERATE 35: CPT | Performed by: NURSE PRACTITIONER

## 2022-06-15 PROCEDURE — 85027 COMPLETE CBC AUTOMATED: CPT | Performed by: NURSE PRACTITIONER

## 2022-06-15 PROCEDURE — 36415 COLL VENOUS BLD VENIPUNCTURE: CPT | Performed by: NURSE PRACTITIONER

## 2022-06-15 PROCEDURE — 250N000011 HC RX IP 250 OP 636: Performed by: INTERNAL MEDICINE

## 2022-06-15 PROCEDURE — 120N000001 HC R&B MED SURG/OB

## 2022-06-15 PROCEDURE — 82310 ASSAY OF CALCIUM: CPT | Performed by: NURSE PRACTITIONER

## 2022-06-15 PROCEDURE — 99231 SBSQ HOSP IP/OBS SF/LOW 25: CPT | Performed by: NURSE PRACTITIONER

## 2022-06-15 PROCEDURE — 250N000013 HC RX MED GY IP 250 OP 250 PS 637: Performed by: NURSE PRACTITIONER

## 2022-06-15 RX ADMIN — POTASSIUM CHLORIDE 20 MEQ: 750 CAPSULE, EXTENDED RELEASE ORAL at 22:06

## 2022-06-15 RX ADMIN — POTASSIUM CHLORIDE 20 MEQ: 750 CAPSULE, EXTENDED RELEASE ORAL at 09:01

## 2022-06-15 RX ADMIN — CEFDINIR 300 MG: 300 CAPSULE ORAL at 09:01

## 2022-06-15 RX ADMIN — BUPROPION HYDROCHLORIDE 150 MG: 150 TABLET, FILM COATED, EXTENDED RELEASE ORAL at 09:01

## 2022-06-15 RX ADMIN — MICONAZOLE NITRATE: 2 POWDER TOPICAL at 22:06

## 2022-06-15 RX ADMIN — ATORVASTATIN CALCIUM 40 MG: 40 TABLET, FILM COATED ORAL at 22:07

## 2022-06-15 RX ADMIN — ENOXAPARIN SODIUM 40 MG: 40 INJECTION SUBCUTANEOUS at 22:07

## 2022-06-15 RX ADMIN — MICONAZOLE NITRATE: 2 POWDER TOPICAL at 12:06

## 2022-06-15 RX ADMIN — CLONAZEPAM 0.5 MG: 0.5 TABLET ORAL at 22:06

## 2022-06-15 RX ADMIN — CEFDINIR 300 MG: 300 CAPSULE ORAL at 22:07

## 2022-06-15 RX ADMIN — ACETAMINOPHEN 325 MG: 325 TABLET ORAL at 01:17

## 2022-06-15 ASSESSMENT — ACTIVITIES OF DAILY LIVING (ADL)
ADLS_ACUITY_SCORE: 39

## 2022-06-15 NOTE — PROGRESS NOTES
06/15/22 1100   Appointment Canceled   Appointment Canceled Patient declined   Cancel Comments Attempted to see patient this AM for skilled PO trials. Patient declined trials and stated that she wanted to rest. Patient reported that she likes the new diet recommendation. Updated hospitalist. Discussed how IDDSI 6 (soft and bite sized) and thin liquids is likely patients safest/least restrictive diet recommendations due to lack of dentition. Hospitalist in agreement with discharging patient from skilled SLP services with current diet recommendations. If patient begins to have increased difficulty with swallowing new orders should be placed for SLP to re-assess.   Signing Clinician's Name / Credentials   Signing clinician's name / credentials Nasra Farnsworth MS CCC-SLP   Quick Adds   Rehab Discipline SLP

## 2022-06-15 NOTE — PHARMACY-MEDICATION REGIMEN REVIEW
Pharmacy Antimicrobial Stewardship/COVID Assessment     Current Antimicrobial Therapy:  Anti-infectives (From now, onward)    Start     Dose/Rate Route Frequency Ordered Stop    22 1700  fluconazole (DIFLUCAN) tablet 100 mg         100 mg Oral EVERY 72 HOURS 22 1641      22 0900  cefdinir (OMNICEF) capsule 300 mg         300 mg Oral 2 TIMES DAILY 22 1224            Indication: UTI + Topical Yeast Infection    Days of Therapy: Day 4 Cefdinir, Day 8 Cephalosporin. Day 3 Fluconazole     Pertinent Labs:  Recent Labs   Lab Test 06/15/22  0615 22  0545 22  0607   WBC 6.3 7.3 8.3     Recent Labs   Lab Test 22  0535 22  1650   LACT  --  1.5   CRP 16.9* 16.4*   PCAL  --  0.26*        Temperature:  Temp (24hrs), Av.9  F (36.6  C), Min:97.3  F (36.3  C), Max:98.6  F (37  C)    Culture Results:   (22) Urine = Klebsiella + E.coli + Aerococcus pneumoniae    (22) COVID = positive    Recommendations/Interventions:  1. Fluconazole can further prolong QT interval; monitor closely.  2. Per notes it seems this is Cutaneous Candidiasis where dosing would be 100 mg daily per Rich Guide:     3. Today is Day #8 of cephalosporin. Stop unless treatment past 7 days is warranted     Sen Goodwin, [facility]  Zee 15, 2022

## 2022-06-15 NOTE — PROGRESS NOTES
"INPATIENT ROUNDING NOTE  6/15/2022    Patient: Raquel Sanchez    Physician of Record: Hospitalist Service    Admitting diagnosis: Weakness [R53.1]  Esophagitis [K20.90]  Pleural effusion [J90]  Generalized muscle weakness [M62.81]  Failure to thrive in adult [R62.7]  Decubitus ulcer of buttock [L89.309]  COVID-19 [U07.1]    Length of stay: 8    Patient seen for wound care    CURRENT MEDICATIONS:  Continuous Medications:  Current Facility-Administered Medications   Medication Last Rate       Scheduled Medications:  Current Facility-Administered Medications   Medication Dose Route Frequency     [Held by provider] amLODIPine  10 mg Oral Daily     atorvastatin  40 mg Oral QPM     buPROPion  150 mg Oral Daily     cefdinir  300 mg Oral BID     enoxaparin ANTICOAGULANT  40 mg Subcutaneous Q24H     fluconazole  100 mg Oral Q72H     miconazole   Topical BID     nicotine  1 patch Transdermal Daily     nicotine   Transdermal Q8H     potassium chloride ER  20 mEq Oral BID     sodium chloride (PF)  3 mL Intracatheter Q8H     sodium chloride (PF)  5 mL Intravenous Q8H       PRN Medications:  Current Facility-Administered Medications   Medication Dose Route Frequency     acetaminophen  650 mg Rectal Q6H PRN     acetaminophen  325 mg Oral Q6H PRN     clonazePAM  0.5 mg Oral BID PRN     prochlorperazine  5 mg Intravenous Q6H PRN    Or     prochlorperazine  5 mg Oral Q6H PRN    Or     prochlorperazine  12.5 mg Rectal Q12H PRN     sodium chloride (PF)  3 mL Intracatheter q1 min prn       SUBJECTIVE:   Nausea: No. Vomiting: No. Fever: No. Chills: No. Excessive burping: No. Flatus: Yes. BM: Yes. Pain control: good. Tolerating current diet: Yes.    PHYSICAL EXAM:   Vital signs: BP 99/60 (BP Location: Left arm, Patient Position: Semi-Gaston's, Cuff Size: Adult Regular)   Pulse 107   Temp 97.3  F (36.3  C) (Tympanic)   Resp 18   Ht 1.575 m (5' 2\")   Wt 57.8 kg (127 lb 6.8 oz)   SpO2 93%   BMI 23.31 kg/m     BMI: Body mass index is " 23.31 kg/m .   General: Normal, healthy, cooperative, in no acute distress, alert   Lungs: respirations are non-labored   Bilateral inner, upper thigh skin irritation consistent with a yeast infection is noted--this is greatly improved     Neurological: without deficit    INPUT/OUTPUT:      Intake/Output Summary (Last 24 hours) at 6/15/2022 1027  Last data filed at 6/14/2022 2012  Gross per 24 hour   Intake 1430 ml   Output --   Net 1430 ml       I/O last 3 completed shifts:  In: 1910 [P.O.:1910]  Out: -     LABS:    Last CBC Rrsults:   Recent Labs   Lab Test 06/15/22  0615 06/14/22  0545 06/13/22  0607   WBC 6.3 7.3 8.3   RBC 2.71* 2.57* 2.69*   HGB 8.6* 8.2* 8.5*   HCT 25.8* 24.7* 25.2*   MCV 95 96 94   MCH 31.7 31.9 31.6   MCHC 33.3 33.2 33.7   RDW 15.8* 15.8* 15.2*    376 426       Last Comprehensive Metabolic panel:  Recent Labs   Lab Test 06/15/22  0615 06/14/22  0545 06/13/22  0607 06/09/22  0726 06/08/22  0535 06/07/22  2312 06/07/22  1650 05/12/22  0919 11/15/21  1101    138 140   < > 135  --  135   < > 138   POTASSIUM 3.8 3.9 3.7   < > 3.5   < > 4.1   < > 3.8   CHLORIDE 108 108 107   < > 104  --  104   < > 106   CO2 26 26 28   < > 23  --  21   < > 25   ANIONGAP 5 4 5   < > 8  --  10   < > 7   GLC 88 96 88   < > 88  --  116*   < > 93   BUN 6* 6* 4*   < > 3*  --  3*   < > 4*   CR 0.32* 0.29* 0.30*   < > 0.33*   < > 0.43*   < > 0.60   GFRESTIMATED >90 >90 >90   < > >90   < > >90   < > >90   ROSA 8.2* 8.0* 7.9*   < > 7.4*  --  7.9*   < > 8.9   BILITOTAL  --   --   --   --  0.5  --  0.6  --  0.3   ALKPHOS  --   --   --   --  318*  --  351*  --  60   ALT  --   --   --   --  18  --  18  --  10   AST  --   --   --   --  40  --  38  --  13    < > = values in this interval not displayed.       Recent Labs   Lab Test 06/13/22  0607 06/12/22  0628 06/11/22  0613   MAG 1.8 1.6 1.6   ALBUMIN 1.4* 1.3* 1.3*   PHOS 2.5 2.6 2.7         ASSESSMENT:    72 year old female admitted for Weakness [R53.1]  Esophagitis  [K20.90]  Pleural effusion [J90]  Generalized muscle weakness [M62.81]  Failure to thrive in adult [R62.7]  Decubitus ulcer of buttock [L89.309]  COVID-19 [U07.1].  Hospital day 8.      PLAN:   Continue current wound care treatment plan

## 2022-06-15 NOTE — PLAN OF CARE
Free from falls/injuries this shift. Refused to get OOB and into chair for dinner. Inc of B&B this shift. Turned Q2H. SL intact. Denies pain. Req and med with Clonopin this evening. Remains on room air. Refuses mauricio patch. States she will start to smoke again as soon as she can.     Face to face report given with opportunity to observe patient.    Report given to Ammy Agarwal RN   6/14/2022  11:10 PM

## 2022-06-15 NOTE — PROGRESS NOTES
"   06/15/22 0912   Signing Clinician's Name / Credentials   Signing clinician's name / credentials Adamaris Taylor DPT   Quick Adds   Rehab Discipline PT   Therapeutic Activity   Minutes of Treatment 24 minutes   Symptoms Noted During/After Treatment Fatigue;Shortness of breath   Treatment Detail Pt resting in bed upon PT arrival, states she is waiting for someone to come clear her up due itching.  Discussed need to get OOB and start moving and would have aides come in when we were finished.  Did clarify that pt was not soiled and pt states \"no I just itch\".  Pt transferred sup>sit SBA to EOB with use of siderail.  Pt transferred sit>stand CGA from EOB.  Pt ambulated to door approx 20' with FWW CGA, very slow pace and wanting to stop midway due to being tired but with encouragement continued ambulating.  Pt's O2 sats 91%.  After rest break, pt transferred sit>stand CGA from chair with cues for hand placement.  Pt ambulated additional 30' with FWW CGA, slow pace, shuffled gait.  Pt fatigues quickly, upon approaching chair pt attempting to reach for chair before sitting, cued for safety to turn completely in front of chair before attempting to sit.  O2 sats 95% after activity.  Pt left sitting up in chair with call light in reach and clip alarm on.   PT Discharge Planning   PT Discharge Recommendation (DC Rec) Transitional Care Facility   PT Rationale for DC Rec Continues to remain weak, limited activity tolerance, unsafe to return home at current level.  Requires short term rehab.   PT Brief overview of current status Pt resting in bed upon PT arrival, states she is waiting for someone to come clear her up due itching.  Discussed need to get OOB and start moving and would have aides come in when we were finished.  Did clarify that pt was not soiled and pt states \"no I just itch\".  Pt transferred sup>sit SBA to EOB with use of siderail.  Pt transferred sit>stand CGA from EOB.  Pt ambulated to door approx 20' with FWW " CGA, very slow pace and wanting to stop midway due to being tired but with encouragement continued ambulating.  Pt's O2 sats 91%.  After rest break, pt transferred sit>stand CGA from chair with cues for hand placement.  Pt ambulated additional 30' with FWW CGA, slow pace, shuffled gait.  Pt fatigues quickly, upon approaching chair pt attempting to reach for chair before sitting, cued for safety to turn completely in front of chair before attempting to sit.  O2 sats 95% after activity.  Pt left sitting up in chair with call light in reach and clip alarm on.   Additional Documentation   Rehab Comments limited activity tolerance   PT Plan Progress functional mobility and strength.   Total Session Time   Total Session Time (minutes) 24 minutes

## 2022-06-15 NOTE — PROGRESS NOTES
[facility]    Hospitalist Progress Note    Date of Service (when I saw the patient): 06/15/2022    Assessment & Plan       Generalized muscle weakness: Initially there was concern for new neurological cause of weakness as it was worse in lower extremities, particularly the left lower extremity. However, MRI was done on entire spine that showed no acute changes. She does have degenerative anterolisthesis of L4 relative to L5 with severe bilateral facet joint degenerative change. Anterolisthesis results in uncovering of the posterior margins of the disc, mildly narrowing the central canal and moderately narrowing the lateral recesses with slight mass effect upon the intrathecal andexiting L5 nerve roots, worse on the left. Today she was able to stand with minimal assist and sit in the chair. She denies any back pain. Continue PT/OT.  -6/14: Continue therapy and increased activity       Acute cystitis without hematuria: UC positive for aeurococcus, klebsiella, e. Coli. Covered by the Rocephin started on arrival and now transitioned to Omnicef which will cover all 3.       COVID-19 infection: Positive on arrival. She did present with some hypoxia. However, no cough, no fevers despite UTI. Supportive cares.       Acute respiratory failure with hypoxia: Mild. Present on arrival. Required 1 liter of oxygen on arrival, then increased to 2-3. However she was successfully weaned off today. Due to prolonged period of inactivity causing atelectasis vs due to bilateral effusions. She has not fevers, no leukocytosis so bacterial pneumonia unlikely. CT chest does show effusions and LLL atelectasis vs pneumonia.   -6/14: Lung sounds improved, she did need 1 liter of oxygen overnight, unknown if this is new due to covid or chronic (most likely). Will plan on discharge with NOC oxygen.       Severe malnutrition (H): Recent weight loss, very poor oral intake since arrival. Encouraging oral intake, supplements.   -6/14: She  states she does not like pureed diet (previously requested this per speech) so will see if speech can advance her. Likely she will do fine-she does have very poor dentition and does not wear her dentures due to ill fit-but his is very chronic for her.        Depression: Flat affect, making statements about difficult living at her age. She apparently sat in her recliner for 2 weeks straight at home prior to coming in. She has known history of anxiety that she takes clonazepam for at home. Start on wellbutrin-avoiding selective serotonin reuptake inhibitor due to her underlying prolonged QT.       Chronic low back pain: Declined having any pain.       Tobacco abuse:Chronic, nicotine replacement.       Alcohol use: Chronic      DVT Prophylaxis: Enoxaparin (Lovenox) SQ  Code Status: Full Code    Disposition: Expected discharge in 2-3 days once placement found.    Rani Bundy CNP    Interval History    Alert and conversant.  Denies chest pain, dyspnea, abdominal pain.    -Data reviewed today: I reviewed all new labs and imaging results over the last 24 hours.     Physical Exam   Temp: 97.3  F (36.3  C) Temp src: Tympanic BP: 99/60 Pulse: 107   Resp: 18 SpO2: 93 % O2 Device: None (Room air)    Vitals:    06/12/22 0513 06/13/22 0500 06/14/22 0415   Weight: 58.1 kg (128 lb 1.4 oz) 58.4 kg (128 lb 12 oz) 57.8 kg (127 lb 6.8 oz)     Vital Signs with Ranges  Temp:  [97.3  F (36.3  C)-98.6  F (37  C)] 97.3  F (36.3  C)  Pulse:  [] 107  Resp:  [18] 18  BP: ()/(60-66) 99/60  SpO2:  [92 %-95 %] 93 %  I/O last 3 completed shifts:  In: 1910 [P.O.:1910]  Out: -     Peripheral IV 06/13/22 Right Hand (Active)   Site Assessment WDL 06/15/22 0857   Line Status Saline locked 06/15/22 0857   Phlebitis Scale 0-->no symptoms 06/15/22 0857   Infiltration Scale 0 06/15/22 0857   Number of days: 2       Wound Coccyx (Active)   Wound Bed Moist;Red 06/15/22 0117   Anahy-wound Assessment Erythema;Excoriated 06/15/22 0117    Drainage Amount None 06/14/22 1757   Wound Care/Cleansing Barrier applied  06/15/22 0117   Dressing Open to air 06/15/22 0117   Number of days: 8     Line/device assessment(s) completed for medical necessity    Constitutional: Awake,alert, no acute distress  Respiratory: Course crackles right base, left base has improved significantly, no wheezes, rhonchi.  Cardiovascular: HRR, no murmurs, rubs,thrills.   GI: Soft,nontender,bowel sounds positive.   Skin/Integumen: No rashes, open areas or unusual bruises.       Medications       [Held by provider] amLODIPine  10 mg Oral Daily     atorvastatin  40 mg Oral QPM     buPROPion  150 mg Oral Daily     cefdinir  300 mg Oral BID     enoxaparin ANTICOAGULANT  40 mg Subcutaneous Q24H     fluconazole  100 mg Oral Q72H     miconazole   Topical BID     nicotine  1 patch Transdermal Daily     nicotine   Transdermal Q8H     potassium chloride ER  20 mEq Oral BID     sodium chloride (PF)  3 mL Intracatheter Q8H     sodium chloride (PF)  5 mL Intravenous Q8H       Data   Recent Labs   Lab 06/15/22  0615 06/14/22  0545 06/13/22  0607 06/12/22  0628   WBC 6.3 7.3 8.3 7.9   HGB 8.6* 8.2* 8.5* 9.1*   MCV 95 96 94 92    376 426 404    138 140 140   POTASSIUM 3.8 3.9 3.7 3.5   CHLORIDE 108 108 107 108   CO2 26 26 28 28   BUN 6* 6* 4* 2*   CR 0.32* 0.29* 0.30* 0.32*   ANIONGAP 5 4 5 4   ROSA 8.2* 8.0* 7.9* 7.7*   GLC 88 96 88 93   ALBUMIN  --   --  1.4* 1.3*       No results found for this or any previous visit (from the past 24 hour(s)).

## 2022-06-15 NOTE — PLAN OF CARE
"/60   Pulse 95   Temp 97.5  F (36.4  C) (Tympanic)   Resp 18   Ht 1.575 m (5' 2\")   Wt 57.8 kg (127 lb 6.8 oz)   SpO2 95%   BMI 23.31 kg/m      Pt is A&O x4. C/o aching pain 6/10 to BLE. Tylenol administered with effectiveness. Turn and incontinent cares q2h and prn. IV Sl and patent. Trace edema to ankles. Call light in reach.     Face to face report given with opportunity to observe patient.    Report given to LON Roger RN   6/15/2022  7:13 AM      "

## 2022-06-15 NOTE — PLAN OF CARE
"Goal Outcome Evaluation:    Most recent vitals: BP 99/60 (BP Location: Left arm, Patient Position: Semi-Gaston's, Cuff Size: Adult Regular)   Pulse 107   Temp 97.3  F (36.3  C) (Tympanic)   Resp 18   Ht 1.575 m (5' 2\")   Wt 57.8 kg (127 lb 6.8 oz)   SpO2 93%   BMI 23.31 kg/m      Comments:      A/O, calm. Cooperative, remains free from falls.  Up to chair apex 3 hrs this am, complains of pain back of lower extremities where they were on the chair, denies interventions at this time.  Pt refuses lunch and breakfast (only coffee)    Pt has infrequent, productive cough, with fine crackles in the LLL & RLL.  Pt remians on RA w/ O2 @ 93%.      Pt's perineum and groin area remain red and excoriated.  Micatin powder applied to groin area.     Face to face report given with opportunity to observe patient.    Report given to LON Sarabia RN   6/15/2022  3:20 PM      "

## 2022-06-15 NOTE — PLAN OF CARE
Speech Language Therapy Discharge Summary    Reason for therapy discharge:    All goals and outcomes met, no further needs identified.    Progress towards therapy goal(s). See goals on Care Plan in [facility] electronic health record for goal details.  Goals met Patient is tolerating IDDSI 6 (soft and bite sized) and thin liquids. Due to patients limited dentition this is her recommended safest/least restrictive diet texture.     Therapy recommendation(s):    No further therapy is recommended.

## 2022-06-16 ENCOUNTER — APPOINTMENT (OUTPATIENT)
Dept: PHYSICAL THERAPY | Facility: HOSPITAL | Age: 72
DRG: 177 | End: 2022-06-16
Payer: MEDICARE

## 2022-06-16 LAB
CREAT SERPL-MCNC: 0.33 MG/DL (ref 0.52–1.04)
GFR SERPL CREATININE-BSD FRML MDRD: >90 ML/MIN/1.73M2
PLATELET # BLD AUTO: 369 10E3/UL (ref 150–450)

## 2022-06-16 PROCEDURE — 120N000001 HC R&B MED SURG/OB

## 2022-06-16 PROCEDURE — 85049 AUTOMATED PLATELET COUNT: CPT | Performed by: INTERNAL MEDICINE

## 2022-06-16 PROCEDURE — 99232 SBSQ HOSP IP/OBS MODERATE 35: CPT | Performed by: NURSE PRACTITIONER

## 2022-06-16 PROCEDURE — 250N000013 HC RX MED GY IP 250 OP 250 PS 637: Performed by: INTERNAL MEDICINE

## 2022-06-16 PROCEDURE — 97530 THERAPEUTIC ACTIVITIES: CPT | Mod: GP | Performed by: PHYSICAL THERAPIST

## 2022-06-16 PROCEDURE — 250N000013 HC RX MED GY IP 250 OP 250 PS 637: Performed by: NURSE PRACTITIONER

## 2022-06-16 PROCEDURE — 99231 SBSQ HOSP IP/OBS SF/LOW 25: CPT | Performed by: NURSE PRACTITIONER

## 2022-06-16 PROCEDURE — 82565 ASSAY OF CREATININE: CPT | Performed by: INTERNAL MEDICINE

## 2022-06-16 PROCEDURE — 36415 COLL VENOUS BLD VENIPUNCTURE: CPT | Performed by: INTERNAL MEDICINE

## 2022-06-16 PROCEDURE — 250N000011 HC RX IP 250 OP 636: Performed by: INTERNAL MEDICINE

## 2022-06-16 RX ADMIN — POTASSIUM CHLORIDE 20 MEQ: 750 CAPSULE, EXTENDED RELEASE ORAL at 08:05

## 2022-06-16 RX ADMIN — POTASSIUM CHLORIDE 20 MEQ: 750 CAPSULE, EXTENDED RELEASE ORAL at 20:46

## 2022-06-16 RX ADMIN — MICONAZOLE NITRATE: 2 POWDER TOPICAL at 08:11

## 2022-06-16 RX ADMIN — ENOXAPARIN SODIUM 40 MG: 40 INJECTION SUBCUTANEOUS at 20:46

## 2022-06-16 RX ADMIN — MICONAZOLE NITRATE: 2 POWDER TOPICAL at 20:47

## 2022-06-16 RX ADMIN — FLUCONAZOLE 100 MG: 100 TABLET ORAL at 18:28

## 2022-06-16 RX ADMIN — BUPROPION HYDROCHLORIDE 150 MG: 150 TABLET, FILM COATED, EXTENDED RELEASE ORAL at 08:05

## 2022-06-16 RX ADMIN — ATORVASTATIN CALCIUM 40 MG: 40 TABLET, FILM COATED ORAL at 20:46

## 2022-06-16 RX ADMIN — CLONAZEPAM 0.5 MG: 0.5 TABLET ORAL at 22:15

## 2022-06-16 RX ADMIN — CEFDINIR 300 MG: 300 CAPSULE ORAL at 08:05

## 2022-06-16 ASSESSMENT — ACTIVITIES OF DAILY LIVING (ADL)
ADLS_ACUITY_SCORE: 39
ADLS_ACUITY_SCORE: 43
ADLS_ACUITY_SCORE: 39

## 2022-06-16 NOTE — PROGRESS NOTES
"CLINICAL NUTRITION SERVICES  -  REASSESSMENT NOTE    Raquel Hamri :    72 yof admitted for weakness. COVID+. Pt has a past medical hx including tobacco and alcohol abuse, HLD, HTN. Intake and appetite continue to be poor. Consuming some Ensure supplements. Diet advanced to soft and bite sized from pureed. Weight stable since admission. Last BM documented on 6/16.    Diet Order: Soft and Bite sized (level 6), thin liquids (level 0)  Intake: 9 meals with 0-50% intake. Mostly 25% intake or less.    Height: 5' 2\"  Weight: 127 lbs 6.81 oz  Body mass index is 23.31 kg/m .  Weight Status:  Normal BMI  Weight History: admit weight- 58.3kg. 23% weight loss in 7 months  Wt Readings from Last 10 Encounters:   06/08/22 58.3 kg (128 lb 8.5 oz)   05/12/22 65.3 kg (144 lb)   12/15/21 73.5 kg (162 lb)   11/15/21 75.3 kg (166 lb)   09/01/20 65.3 kg (144 lb)   12/09/19 56.7 kg (125 lb)   07/31/18 59.4 kg (131 lb)   01/10/18 57.2 kg (126 lb 3.2 oz)   11/29/17 57.6 kg (127 lb)   10/09/17 59 kg (130 lb)      Weight used to calculate needs: 58.3kg admit weight  Estimated Energy Needs: 6301-7637 kcals (25-30 Kcal/Kg)   Estimated Protein Needs: 60-70 grams protein (1-1.2 g pro/Kg)  Estimated Fluid Needs: 7790-2551  mL (1 mL/Kcal)     Malnutrition Diagnosis: Severe malnutrition  In Context of:  Chronic illness or disease      NUTRITION RECOMMENDATIONS  - Continue to encourage intake and offer Ensure with/between meals  - May benefit from a daily multivitamin/mineral supplement     MONITORING AND EVALUATION:  RD will monitor intake, weight, labs       "

## 2022-06-16 NOTE — PHARMACY
Pharmacy Antimicrobial Stewardship Assessment     Current Antimicrobial Therapy:  Anti-infectives (From now, onward)    Start     Dose/Rate Route Frequency Ordered Stop    22 1700  fluconazole (DIFLUCAN) tablet 100 mg         100 mg Oral EVERY 72 HOURS 22 1641            Indication: topical candidiasis    Days of Therapy: 1 dose thus far     Pertinent Labs:    Recent labs: (last 7 days)     22  0628 22  0607 22  0545 06/15/22  0615   WBC 7.9 8.3 7.3 6.3       Temperature:  Temp (24hrs), Av.8  F (36.6  C), Min:97.5  F (36.4  C), Max:98.6  F (37  C)      Recommendations/Interventions:  1. None at this time.    Alma Delia Ahumada, [facility]  2022

## 2022-06-16 NOTE — PROGRESS NOTES
[facility]    Hospitalist Progress Note    Date of Service (when I saw the patient): 06/16/2022    Assessment & Plan       Generalized muscle weakness: Initially there was concern for new neurological cause of weakness as it was worse in lower extremities, particularly the left lower extremity. However, MRI was done on entire spine that showed no acute changes. She does have degenerative anterolisthesis of L4 relative to L5 with severe bilateral facet joint degenerative change. Anterolisthesis results in uncovering of the posterior margins of the disc, mildly narrowing the central canal and moderately narrowing the lateral recesses with slight mass effect upon the intrathecal andexiting L5 nerve roots, worse on the left. Today she was able to stand with minimal assist and sit in the chair. She denies any back pain. Continue PT/OT.  -6/14: Continue therapy and increased activity       Acute cystitis without hematuria: UC positive for aeurococcus, klebsiella, e. Coli. Covered by the Rocephin started on arrival and now transitioned to Omnicef which will cover all 3.   -6/15: Course complete      COVID-19 infection: Positive on arrival. She did present with some hypoxia. However, no cough, no fevers despite UTI. Supportive cares.       Acute respiratory failure with hypoxia: Mild. Present on arrival. Required 1 liter of oxygen on arrival, then increased to 2-3. However she was successfully weaned off today. Due to prolonged period of inactivity causing atelectasis vs due to bilateral effusions. She has not fevers, no leukocytosis so bacterial pneumonia unlikely. CT chest does show effusions and LLL atelectasis vs pneumonia.   -6/14: Lung sounds improved, she did need 1 liter of oxygen overnight, unknown if this is new due to covid or chronic (most likely). Will plan on discharge with NOC oxygen.       Severe malnutrition (H): Recent weight loss, very poor oral intake since arrival. Encouraging oral intake,  supplements.   -6/14: She states she does not like pureed diet (previously requested this per speech) so will see if speech can advance her. Likely she will do fine-she does have very poor dentition and does not wear her dentures due to ill fit-but his is very chronic for her.        Depression: Flat affect, making statements about difficult living at her age. She apparently sat in her recliner for 2 weeks straight at home prior to coming in. She has known history of anxiety that she takes clonazepam for at home. Start on wellbutrin-avoiding selective serotonin reuptake inhibitor due to her underlying prolonged QT.   -6/16: Improved mood noted over the last 2 days. She is more animated, good eye contact, excited to be removed from isolation when quarantine complete.       Chronic low back pain: Declines having any pain.       Tobacco abuse:Chronic, nicotine replacement.       Alcohol use: Chronic      DVT Prophylaxis: Enoxaparin (Lovenox) SQ  Code Status: Full Code    Disposition: Expected discharge in 2-3 days once placement found.    Rani Bundy CNP    Interval History    Alert and conversant.  Denies chest pain, dyspnea, abdominal pain.    -Data reviewed today: I reviewed all new labs and imaging results over the last 24 hours.     Physical Exam   Temp: 97.7  F (36.5  C) Temp src: Tympanic BP: 115/64 Pulse: 95   Resp: 16 SpO2: 94 % O2 Device: None (Room air)    Vitals:    06/12/22 0513 06/13/22 0500 06/14/22 0415   Weight: 58.1 kg (128 lb 1.4 oz) 58.4 kg (128 lb 12 oz) 57.8 kg (127 lb 6.8 oz)     Vital Signs with Ranges  Temp:  [97.5  F (36.4  C)-98.6  F (37  C)] 97.7  F (36.5  C)  Pulse:  [91-98] 95  Resp:  [16-20] 16  BP: ()/(52-76) 115/64  SpO2:  [92 %-96 %] 94 %  I/O last 3 completed shifts:  In: 960 [P.O.:960]  Out: -     Peripheral IV 06/13/22 Right Hand (Active)   Site Assessment WDL 06/16/22 0802   Line Status Saline locked 06/16/22 0802   Phlebitis Scale 0-->no symptoms 06/16/22 0802    Infiltration Scale 0 06/16/22 0802   Number of days: 3       Wound Coccyx (Active)   Wound Bed Moist;Red 06/16/22 0802   Anahy-wound Assessment Excoriated;Erythema 06/16/22 0802   Drainage Amount None 06/16/22 0802   Wound Care/Cleansing Barrier applied  06/16/22 0802   Dressing Open to air 06/16/22 0802   Number of days: 9     Line/device assessment(s) completed for medical necessity    Constitutional: Awake,alert, no acute distress  Respiratory: Course crackles right base, left base has improved significantly, no wheezes, rhonchi.  Cardiovascular: HRR, no murmurs, rubs,thrills.   GI: Soft,nontender,bowel sounds positive.   Skin/Integumen: No rashes, open areas or unusual bruises.       Medications       [Held by provider] amLODIPine  10 mg Oral Daily     atorvastatin  40 mg Oral QPM     buPROPion  150 mg Oral Daily     cefdinir  300 mg Oral BID     enoxaparin ANTICOAGULANT  40 mg Subcutaneous Q24H     fluconazole  100 mg Oral Q72H     miconazole   Topical BID     nicotine  1 patch Transdermal Daily     nicotine   Transdermal Q8H     potassium chloride ER  20 mEq Oral BID     sodium chloride (PF)  3 mL Intracatheter Q8H     sodium chloride (PF)  5 mL Intravenous Q8H       Data   Recent Labs   Lab 06/16/22  0549 06/15/22  0615 06/14/22  0545 06/13/22  0607 06/12/22  0628   WBC  --  6.3 7.3 8.3 7.9   HGB  --  8.6* 8.2* 8.5* 9.1*   MCV  --  95 96 94 92    440 376 426 404   NA  --  139 138 140 140   POTASSIUM  --  3.8 3.9 3.7 3.5   CHLORIDE  --  108 108 107 108   CO2  --  26 26 28 28   BUN  --  6* 6* 4* 2*   CR 0.33* 0.32* 0.29* 0.30* 0.32*   ANIONGAP  --  5 4 5 4   ROSA  --  8.2* 8.0* 7.9* 7.7*   GLC  --  88 96 88 93   ALBUMIN  --   --   --  1.4* 1.3*       No results found for this or any previous visit (from the past 24 hour(s)).

## 2022-06-16 NOTE — PLAN OF CARE
"Goal Outcome Evaluation:    Most recent vitals: /64 (BP Location: Left arm, Patient Position: Semi-Gaston's, Cuff Size: Adult Regular)   Pulse 95   Temp 97.7  F (36.5  C) (Tympanic)   Resp 16   Ht 1.575 m (5' 2\")   Wt 57.8 kg (127 lb 6.8 oz)   SpO2 94%   BMI 23.31 kg/m      Comments:       A/O, VS stable, afebrile, denies pain.  Patient up to chair for breakfast refused food (had coffee & ensure), went back to bed prior to lunch and refused to get out of bed after that. Anahy-area remains red/excoriated (yeasty in groin folds using micatin powder), Buttocks, using barrier cream.  Pt. States it hurts less.  Lungs diminished with crackles in mid & lower lobes.  Tachy at times.     Face to face report given with opportunity to observe patient.    Report given to LON Tobias RN   6/16/2022  3:29 PM      "

## 2022-06-16 NOTE — PLAN OF CARE
"Goal Outcome Evaluation:    BP 98/52 (BP Location: Left arm, Patient Position: Semi-Gaston's)   Pulse 91   Temp 97.5  F (36.4  C) (Tympanic)   Resp 18   Ht 1.575 m (5' 2\")   Wt 57.8 kg (127 lb 6.8 oz)   SpO2 92%   BMI 23.31 kg/m         Pt is A&O, makes needs known. VSS, afebrile. Denies pain. Remains on RA, O2 sats mid 90's. LS diminished. HRR, is tachycardic at times. BS active. Excoriation and blanchable redness to linh area and buttocks, powder and cream applied. Turned and repositioned q2H. IV SL. Call light within reach.                 "

## 2022-06-16 NOTE — PROGRESS NOTES
Re: Placement    [facility] has declined patient due to her status as a current every day smoker.     Updated notes sent to other skilled nursing facilities for review. Awaiting response.

## 2022-06-16 NOTE — PROGRESS NOTES
06/16/22 0856   Signing Clinician's Name / Credentials   Signing clinician's name / credentials Adamaris Taylor DPT   Quick Adds   Rehab Discipline PT   Therapeutic Activity   Minutes of Treatment 25 minutes   Symptoms Noted During/After Treatment Fatigue   Treatment Detail Pt resting in bed, agreeable to PT today.  Pt transferred sup>sit SBA to EOB with use of siderail.  Pt needs cues to scoot to edge of bed.  Pt transferred sit>stand min A from EOB with cues needed for hand placement.  Pt ambulated 40' with FWW, slow-shuffled steps again but somewhat improved from yesterday.  Pt very slow to make corners/turns.  Pt required seated rest break due to fatigue.  Pt transferred sit>stand from w/c with no cues needed for hand placement and ambulated an additional 40' with FWW CGA, again very slow pace with fatigue, slow with turns.  Pt's O2 sats with activity 97%, .  Pt completed repeated sit<>stands x5 reps from chair as well as seated hip flex x10, seated LAQ x10.  Pt left sitting in chair with clip alarm on and call light in reach.   PT Discharge Planning   PT Discharge Recommendation (DC Rec) Transitional Care Facility   PT Rationale for DC Rec continues with weakness and limited activity tolerance, not safe to return home alone.  Would benefit from short term rehab.   PT Brief overview of current status Pt resting in bed, agreeable to PT today.  Pt transferred sup>sit SBA to EOB with use of siderail.  Pt needs cues to scoot to edge of bed.  Pt transferred sit>stand min A from EOB with cues needed for hand placement.  Pt ambulated 40' with FWW, slow-shuffled steps again but somewhat improved from yesterday.  Pt very slow to make corners/turns.  Pt required seated rest break due to fatigue.  Pt transferred sit>stand from w/c with no cues needed for hand placement and ambulated an additional 40' with FWW CGA, again very slow pace with fatigue, slow with turns.  Pt's O2 sats with activity 97%, .  Pt  completed repeated sit<>stands x5 reps from chair as well as seated hip flex x10, seated LAQ x10.  Pt left sitting in chair with clip alarm on and call light in reach.   Additional Documentation   Rehab Comments making slow but steady gains with consistent participation.   PT Plan Progress functional mobility and strength   Total Session Time   Total Session Time (minutes) 25 minutes

## 2022-06-16 NOTE — PLAN OF CARE
"Reason for hospital stay:  Covid, Weakness    Most recent vitals: /76 (BP Location: Left arm)   Pulse 107   Temp 98.6  F (37  C) (Tympanic)   Resp 20   Ht 1.575 m (5' 2\")   Wt 57.8 kg (127 lb 6.8 oz)   SpO2 93%   BMI 23.31 kg/m      Patient A+O x4 - calls appropriately and makes needs known. VS and assessment as charted in flow sheets. Denied any pain. Lungs with fine crackles to bilateral bases and diminished throughout. Maintaining sats 93% on RA. Continues with congested productive cough. Poor appetite even though diet advanced to soft and did eat some baked fish for supper. Mostly only wants coffee and ensure and declined all other offers of food choices. Incontinent of urine - medicated powder applied to groin rash areas and barrier cream to sacrum / coccyx. Saline Lock patent and continues on PO Omnicef. Refused to get up into chair this evening.      Face to face report given with opportunity to observe patient.    Report given to Shae Sanchez RN   6/15/2022  11:30 PM    "

## 2022-06-16 NOTE — PROGRESS NOTES
"INPATIENT ROUNDING NOTE  6/16/2022    Patient: Raquel Sanchez    Physician of Record: Hospitalist Service    Admitting diagnosis: Weakness [R53.1]  Esophagitis [K20.90]  Pleural effusion [J90]  Generalized muscle weakness [M62.81]  Failure to thrive in adult [R62.7]  Decubitus ulcer of buttock [L89.309]  COVID-19 [U07.1]    Length of stay: 9    Patient seen for wound care.    CURRENT MEDICATIONS:  Continuous Medications:  Current Facility-Administered Medications   Medication Last Rate       Scheduled Medications:  Current Facility-Administered Medications   Medication Dose Route Frequency     [Held by provider] amLODIPine  10 mg Oral Daily     atorvastatin  40 mg Oral QPM     buPROPion  150 mg Oral Daily     cefdinir  300 mg Oral BID     enoxaparin ANTICOAGULANT  40 mg Subcutaneous Q24H     fluconazole  100 mg Oral Q72H     miconazole   Topical BID     nicotine  1 patch Transdermal Daily     nicotine   Transdermal Q8H     potassium chloride ER  20 mEq Oral BID     sodium chloride (PF)  3 mL Intracatheter Q8H     sodium chloride (PF)  5 mL Intravenous Q8H       PRN Medications:  Current Facility-Administered Medications   Medication Dose Route Frequency     acetaminophen  650 mg Rectal Q6H PRN     acetaminophen  325 mg Oral Q6H PRN     clonazePAM  0.5 mg Oral BID PRN     prochlorperazine  5 mg Intravenous Q6H PRN    Or     prochlorperazine  5 mg Oral Q6H PRN    Or     prochlorperazine  12.5 mg Rectal Q12H PRN     sodium chloride (PF)  3 mL Intracatheter q1 min prn       SUBJECTIVE:   Nausea: No. Vomiting: No. Fever: No. Chills: No. Excessive burping: No. Flatus: Yes. BM: Yes. Pain control: good. Tolerating current diet: Yes.     PHYSICAL EXAM:   Vital signs: /64   Pulse 95   Temp 97.7  F (36.5  C) (Tympanic)   Resp 18   Ht 1.575 m (5' 2\")   Wt 57.8 kg (127 lb 6.8 oz)   SpO2 94%   BMI 23.31 kg/m     BMI: Body mass index is 23.31 kg/m .   General: Normal, healthy, cooperative, in no acute distress, " alert   Lungs: respirations are non-labored   Abdominal: non-distended   Bilateral inner, upper thigh skin irritation consistent with a yeast infection is noted--this is almost resolved at this time     Extremities: No cyanosis, clubbing or edema noted bilaterally in Upper and Lower Extremities   Neurological: without deficit    INPUT/OUTPUT:      Intake/Output Summary (Last 24 hours) at 6/16/2022 0917  Last data filed at 6/15/2022 1812  Gross per 24 hour   Intake 960 ml   Output --   Net 960 ml       I/O last 3 completed shifts:  In: 960 [P.O.:960]  Out: -     LABS:    Last CBC Rrsults:   Recent Labs   Lab Test 06/16/22  0549 06/15/22  0615 06/14/22  0545 06/13/22  0607   WBC  --  6.3 7.3 8.3   RBC  --  2.71* 2.57* 2.69*   HGB  --  8.6* 8.2* 8.5*   HCT  --  25.8* 24.7* 25.2*   MCV  --  95 96 94   MCH  --  31.7 31.9 31.6   MCHC  --  33.3 33.2 33.7   RDW  --  15.8* 15.8* 15.2*    440 376 426       Last Comprehensive Metabolic panel:  Recent Labs   Lab Test 06/16/22  0549 06/15/22  0615 06/14/22  0545 06/13/22  0607 06/09/22  0726 06/08/22  0535 06/07/22  2312 06/07/22  1650 05/12/22  0919 11/15/21  1101   NA  --  139 138 140   < > 135  --  135   < > 138   POTASSIUM  --  3.8 3.9 3.7   < > 3.5   < > 4.1   < > 3.8   CHLORIDE  --  108 108 107   < > 104  --  104   < > 106   CO2  --  26 26 28   < > 23  --  21   < > 25   ANIONGAP  --  5 4 5   < > 8  --  10   < > 7   GLC  --  88 96 88   < > 88  --  116*   < > 93   BUN  --  6* 6* 4*   < > 3*  --  3*   < > 4*   CR 0.33* 0.32* 0.29* 0.30*   < > 0.33*   < > 0.43*   < > 0.60   GFRESTIMATED >90 >90 >90 >90   < > >90   < > >90   < > >90   ROSA  --  8.2* 8.0* 7.9*   < > 7.4*  --  7.9*   < > 8.9   BILITOTAL  --   --   --   --   --  0.5  --  0.6  --  0.3   ALKPHOS  --   --   --   --   --  318*  --  351*  --  60   ALT  --   --   --   --   --  18  --  18  --  10   AST  --   --   --   --   --  40  --  38  --  13    < > = values in this interval not displayed.       Recent Labs    Lab Test 06/13/22  0607 06/12/22  0628 06/11/22  0613   MAG 1.8 1.6 1.6   ALBUMIN 1.4* 1.3* 1.3*   PHOS 2.5 2.6 2.7       ASSESSMENT:    72 year old female admitted for Weakness [R53.1]  Esophagitis [K20.90]  Pleural effusion [J90]  Generalized muscle weakness [M62.81]  Failure to thrive in adult [R62.7]  Decubitus ulcer of buttock [L89.309]  COVID-19 [U07.1].  Hospital day 9.    PLAN:   Patient's wounds are resolved at this time.  Hospitalist staff will continue to monitor the patient's skin for yeast and treat as appropriate.  Wound care will sign off on this patient at this time.

## 2022-06-17 ENCOUNTER — APPOINTMENT (OUTPATIENT)
Dept: PHYSICAL THERAPY | Facility: HOSPITAL | Age: 72
DRG: 177 | End: 2022-06-17
Payer: MEDICARE

## 2022-06-17 PROCEDURE — 99232 SBSQ HOSP IP/OBS MODERATE 35: CPT | Performed by: NURSE PRACTITIONER

## 2022-06-17 PROCEDURE — 120N000001 HC R&B MED SURG/OB

## 2022-06-17 PROCEDURE — 250N000011 HC RX IP 250 OP 636: Performed by: INTERNAL MEDICINE

## 2022-06-17 PROCEDURE — 250N000013 HC RX MED GY IP 250 OP 250 PS 637: Performed by: NURSE PRACTITIONER

## 2022-06-17 PROCEDURE — 97530 THERAPEUTIC ACTIVITIES: CPT | Mod: GP | Performed by: PHYSICAL THERAPIST

## 2022-06-17 PROCEDURE — 250N000013 HC RX MED GY IP 250 OP 250 PS 637: Performed by: INTERNAL MEDICINE

## 2022-06-17 RX ADMIN — ENOXAPARIN SODIUM 40 MG: 40 INJECTION SUBCUTANEOUS at 20:45

## 2022-06-17 RX ADMIN — POTASSIUM CHLORIDE 20 MEQ: 750 CAPSULE, EXTENDED RELEASE ORAL at 20:44

## 2022-06-17 RX ADMIN — ATORVASTATIN CALCIUM 40 MG: 40 TABLET, FILM COATED ORAL at 20:45

## 2022-06-17 RX ADMIN — MICONAZOLE NITRATE: 2 POWDER TOPICAL at 09:52

## 2022-06-17 RX ADMIN — POTASSIUM CHLORIDE 20 MEQ: 750 CAPSULE, EXTENDED RELEASE ORAL at 09:46

## 2022-06-17 RX ADMIN — BUPROPION HYDROCHLORIDE 150 MG: 150 TABLET, FILM COATED, EXTENDED RELEASE ORAL at 09:46

## 2022-06-17 RX ADMIN — MICONAZOLE NITRATE: 2 POWDER TOPICAL at 20:32

## 2022-06-17 RX ADMIN — CLONAZEPAM 0.5 MG: 0.5 TABLET ORAL at 20:48

## 2022-06-17 ASSESSMENT — ACTIVITIES OF DAILY LIVING (ADL)
ADLS_ACUITY_SCORE: 39

## 2022-06-17 NOTE — PHARMACY
Pharmacy Antimicrobial Stewardship Assessment     Current Antimicrobial Therapy:  Anti-infectives (From now, onward)    Start     Dose/Rate Route Frequency Ordered Stop    22 1700  fluconazole (DIFLUCAN) tablet 100 mg         100 mg Oral EVERY 72 HOURS 22 1641            Indication: topical candidiasis    Days of Therapy: 2 doses thus far     Pertinent Labs:    Recent labs: (last 7 days)     22  0628 22  0607 22  0545 06/15/22  0615   WBC 7.9 8.3 7.3 6.3       Temperature:  Temp (24hrs), Av.1  F (36.7  C), Min:97.6  F (36.4  C), Max:98.5  F (36.9  C)      Recommendations/Interventions:  1. None at this time.    Alma Delia Ahumada, [facility]  2022

## 2022-06-17 NOTE — PROGRESS NOTES
Patient was offered a bed at [facility] for Tuesday, 06/21. Patient and daughter both updated and agreeable to patient admitting to [facility].

## 2022-06-17 NOTE — PLAN OF CARE
"Goal Outcome Evaluation: Unchanged       Reason for hospital stay:  Generalized weakness, COVID positive.   Living situation PTA: Lives at home by herself.   Most recent vitals: /58 (BP Location: Left arm, Patient Position: Right side, Cuff Size: Adult Regular)   Pulse 107   Temp 98.2  F (36.8  C) (Tympanic)   Resp 18   Ht 1.575 m (5' 2\")   Wt 57.8 kg (127 lb 6.8 oz)   SpO2 95%   BMI 23.31 kg/m      Pain Management:  Pt denies having pain.   LOC:  Alert and orientated to person, place, time, and situation.   Cardiac:  Tachycardic. MD is aware she runs tachycardic at times.   Respiratory:  Uppers diminished. Lowers fine crackles.   GI:  All quadrants audible and normoactive.   :  Incontient of urine and stool.   Skin Issues:  Skin is pale, warm, and dry. No numbness. No tingling.     IVF:  None. IV access right hand. IV SL.   ABX:  PO Diflucan, PO Omnicef     Nutrition: Adequate. Soft and bite sized diet. Thin liquids.   ADL's:  Up in the chair for part of the shift. Refused to get out of bed and into her chair for supper.   Ambulation: Assist x 1 with use of gait belt and walker. Generalized weakness.   Safety:  Bed in the low position, call light within reach, ID band in place, personal items within reach, use of call light appropriately,and makes needs known.     Comments: Vitally stable and afebrile. She is pleasant and cooperative. Face to face report given with opportunity to observe patient.    Report given to Alexandra AHMADI RN.     Ester Cuevas RN   6/17/2022  12:21 AM                      "

## 2022-06-17 NOTE — PLAN OF CARE
"Blood pressure 104/70, pulse 97, temperature 97.8  F (36.6  C), temperature source Tympanic, resp. rate 18, height 1.575 m (5' 2\"), weight 57.8 kg (127 lb 6.8 oz), SpO2 96 %, not currently breastfeeding.      A&O, VSS except BP does run soft 114/66 and 104/70, afebrile, denies pain, on RA w/O2 sats 96%, lngs clear w/ fine crackles to bases, IV Sl and flushes well, 1+ edema as charted, incont of bowel and bladder this shift, repositioning q2, barrier cream to buttocks/coccyx, no mauricio patch, pt off of special precuations this AM, free from falls, makes needs known, slept majority of shift.    Face to face report given with opportunity to observe patient.    Report given to Angelique Singh, RNs    Alexandra Chaves RN   6/17/2022  7:21 AM      "

## 2022-06-17 NOTE — PROGRESS NOTES
Writer continuing to await response from [facility] regarding patient's referral.     Referral also sent to [facility] in [place].     Patient's daughter updated on the skilled nursing facility search.

## 2022-06-17 NOTE — PROGRESS NOTES
"   06/17/22 1221   Signing Clinician's Name / Credentials   Signing clinician's name / credentials Adamaris Taylor DPT   Quick Adds   Rehab Discipline PT   Therapeutic Activity   Minutes of Treatment 18 minutes   Symptoms Noted During/After Treatment Fatigue   Treatment Detail Pt resting in bed upon PT arrival.  Pt states \"this wasn't part of what I agreed to with the provider, look at the board\".  Explained that this was the most important part because she needs to participate with PT if she wants to be accepted into rehab to get stronger.  Pt agreed.  Pt transferred sup>sit SBA to EOB with use of siderail and extra time to complete.  Pt transferred sit>stand from EOB min A up to FWW.  Pt ambulated 40' with FWW CGA-min A at slow pace before needing rest break.  Pt wanted to be wheeled back to room but was encouraged to ambulate again.  Pt transferred sit>stand min A from w/c, made 180 degree turn to ambulate back towards room, slow turning but no major LOB.  Pt ambulated additional 40' with FWW CGA with fatigue noted upon approaching room chair, needs cues to position properly in front of chair before sitting.  Pt refused additional exercises.  Did assist with ordering lunch and notified nursing.  Pt left sitting up in chair with call light in reach and clip alarm on.   PT Discharge Planning   PT Discharge Recommendation (DC Rec) Transitional Care Facility   PT Rationale for DC Rec Pt still demonstrates limited activity tolerance and weakness, not safe to return home.   PT Brief overview of current status Pt resting in bed upon PT arrival.  Pt states \"this wasn't part of what I agreed to with the provider, look at the board\".  Explained that this was the most important part because she needs to participate with PT if she wants to be accepted into rehab to get stronger.  Pt agreed.  Pt transferred sup>sit SBA to EOB with use of siderail and extra time to complete.  Pt transferred sit>stand from EOB min A up to FWW.  " Pt ambulated 40' with FWW CGA-min A at slow pace before needing rest break.  Pt wanted to be wheeled back to room but was encouraged to ambulate again.  Pt transferred sit>stand min A from w/c, made 180 degree turn to ambulate back towards room, slow turning but no major LOB.  Pt ambulated additional 40' with FWW CGA with fatigue noted upon approaching room chair, needs cues to position properly in front of chair before sitting.  Pt refused additional exercises.  Did assist with ordering lunch and notified nursing.  Pt left sitting up in chair with call light in reach and clip alarm on.  O2 sats 96%,  with activity   Additional Documentation   Rehab Comments limited activity tolerance   PT Plan Progress functional mobility and strength   Total Session Time   Total Session Time (minutes) 18 minutes

## 2022-06-17 NOTE — PROGRESS NOTES
[facility]    Hospitalist Progress Note    Date of Service (when I saw the patient): 06/17/2022    Assessment & Plan       Generalized muscle weakness: Initially there was concern for new neurological cause of weakness as it was worse in lower extremities, particularly the left lower extremity. However, MRI was done on entire spine that showed no acute changes. She does have degenerative anterolisthesis of L4 relative to L5 with severe bilateral facet joint degenerative change. Anterolisthesis results in uncovering of the posterior margins of the disc, mildly narrowing the central canal and moderately narrowing the lateral recesses with slight mass effect upon the intrathecal andexiting L5 nerve roots, worse on the left. Today she was able to stand with minimal assist and sit in the chair. She denies any back pain. Continue PT/OT.  -6/14: Continue therapy and increased activity   -6/17: Agrees to get up to cahir x30min for each meal      Acute cystitis without hematuria: UC positive for aeurococcus, klebsiella, e. Coli. Covered by the Rocephin started on arrival and now transitioned to Omnicef which will cover all 3.   -6/15: Course complete      COVID-19 infection: Positive on arrival. She did present with some hypoxia. However, no cough, no fevers despite UTI. Supportive cares.       Acute respiratory failure with hypoxia: Mild. Present on arrival. Required 1 liter of oxygen on arrival, then increased to 2-3. However she was successfully weaned off today. Due to prolonged period of inactivity causing atelectasis vs due to bilateral effusions. She has not fevers, no leukocytosis so bacterial pneumonia unlikely. CT chest does show effusions and LLL atelectasis vs pneumonia.   -6/14: Lung sounds improved, she did need 1 liter of oxygen overnight, unknown if this is new due to covid or chronic (most likely). Will plan on discharge with NOC oxygen.   -6/15: Has not required oxygen overnight  -6/17:  Resolved. Has not required oxygen in several days, including overnight.       Severe malnutrition (H): Recent weight loss, very poor oral intake since arrival. Encouraging oral intake, supplements.   -6/14: She states she does not like pureed diet (previously requested this per speech) so will see if speech can advance her. Likely she will do fine-she does have very poor dentition and does not wear her dentures due to ill fit-but his is very chronic for her.  -6/17: Eating better no that she is on bite sized meals         Depression: Flat affect, making statements about difficult living at her age. She apparently sat in her recliner for 2 weeks straight at home prior to coming in. She has known history of anxiety that she takes clonazepam for at home. Start on wellbutrin-avoiding selective serotonin reuptake inhibitor due to her underlying prolonged QT.   -6/16: Improved mood noted over the last 2 days. She is more animated, good eye contact, excited to be removed from isolation when quarantine complete.       Chronic low back pain: Declines having any pain.       Tobacco abuse:Chronic, nicotine replacement.       Alcohol use: Chronic      DVT Prophylaxis: Enoxaparin (Lovenox) SQ  Code Status: Full Code    Disposition: Expected discharge in 2-3 days once placement found.    Rani Bundy CNP    Interval History    Alert and conversant.  Denies chest pain, dyspnea, abdominal pain.    -Data reviewed today: I reviewed all new labs and imaging results over the last 24 hours.     Physical Exam   Temp: 97.6  F (36.4  C) Temp src: Tympanic BP: 119/66 Pulse: 101   Resp: 16 SpO2: 96 % O2 Device: None (Room air)    Vitals:    06/12/22 0513 06/13/22 0500 06/14/22 0415   Weight: 58.1 kg (128 lb 1.4 oz) 58.4 kg (128 lb 12 oz) 57.8 kg (127 lb 6.8 oz)     Vital Signs with Ranges  Temp:  [97.6  F (36.4  C)-98.5  F (36.9  C)] 97.6  F (36.4  C)  Pulse:  [] 101  Resp:  [16-18] 16  BP: (102-123)/(58-73) 119/66  SpO2:  [93  %-97 %] 96 %  I/O last 3 completed shifts:  In: 1203 [P.O.:1200; I.V.:3]  Out: -     Peripheral IV 06/13/22 Right Hand (Active)   Site Assessment WDL 06/17/22 0024   Line Status Saline locked 06/17/22 0024   Phlebitis Scale 0-->no symptoms 06/17/22 0024   Infiltration Scale 0 06/17/22 0024   Infiltration Site Treatment Method  None 06/16/22 1750   If infiltrated, was a vesicant infusing? No 06/16/22 1750   Number of days: 4       Wound Coccyx (Active)   Wound Bed Moist;Pale;Red 06/17/22 0024   Anahy-wound Assessment Erythema;Excoriated 06/17/22 0024   Drainage Amount None 06/17/22 0024   Wound Care/Cleansing Barrier applied  06/17/22 0024   Dressing Open to air 06/17/22 0024   Number of days: 10     Line/device assessment(s) completed for medical necessity    Constitutional: Awake,alert, no acute distress  Respiratory: Course crackles right base, left base has improved significantly, no wheezes, rhonchi.  Cardiovascular: HRR, no murmurs, rubs,thrills.   GI: Soft,nontender,bowel sounds positive.   Skin/Integumen: No rashes, open areas or unusual bruises.       Medications       [Held by provider] amLODIPine  10 mg Oral Daily     atorvastatin  40 mg Oral QPM     buPROPion  150 mg Oral Daily     enoxaparin ANTICOAGULANT  40 mg Subcutaneous Q24H     fluconazole  100 mg Oral Q72H     miconazole   Topical BID     nicotine  1 patch Transdermal Daily     nicotine   Transdermal Q8H     potassium chloride ER  20 mEq Oral BID     sodium chloride (PF)  3 mL Intracatheter Q8H     sodium chloride (PF)  5 mL Intravenous Q8H       Data   Recent Labs   Lab 06/16/22  0549 06/15/22  0615 06/14/22  0545 06/13/22  0607 06/12/22  0628   WBC  --  6.3 7.3 8.3 7.9   HGB  --  8.6* 8.2* 8.5* 9.1*   MCV  --  95 96 94 92    440 376 426 404   NA  --  139 138 140 140   POTASSIUM  --  3.8 3.9 3.7 3.5   CHLORIDE  --  108 108 107 108   CO2  --  26 26 28 28   BUN  --  6* 6* 4* 2*   CR 0.33* 0.32* 0.29* 0.30* 0.32*   ANIONGAP  --  5 4 5 4   ROSA   --  8.2* 8.0* 7.9* 7.7*   GLC  --  88 96 88 93   ALBUMIN  --   --   --  1.4* 1.3*       No results found for this or any previous visit (from the past 24 hour(s)).

## 2022-06-17 NOTE — PROGRESS NOTES
Patient Name: Raquel Sanchez   MRN: [id number]   Admit Date: 6/7/2022    Current Infection Status: COVID-19   Current Isolation Status: Special Precautions-COVID-19     Review of COVID-19 status and required isolation precautions    Refer to Special Precautions Duration and Period of Transmissibility Guideline, in Policy Tech.        Involved parties: PARKER MCKEON, Infection Prevention and Other Charge LON Ruiz.    Criteria used to make decision: Other asymptomatic amd ten days from positive.    The involved parties have reviewed this patient's chart per the Special Precautions Duration and Period of Transmissibility guideline and have taken the following action:     DECISION - OPTION 1: Patient meets all the criteria for Special Precautions isolation discontinuation and this writer will resolve the COVID-19 infection flag and isolation status, due to: Immunocompetent Asymptomatic: 10 days from positive test AND no COVID-19 symptom development.       PARKER MCKEON, Infection Prevention

## 2022-06-17 NOTE — PLAN OF CARE
"Most recent vitals: /66 (BP Location: Left arm)   Pulse 101   Temp 97.6  F (36.4  C) (Tympanic)   Resp 16   Ht 1.575 m (5' 2\")   Wt 57.8 kg (127 lb 6.8 oz)   SpO2 96%   BMI 23.31 kg/m       A&O, VSS, HR tachy, afebrile. Denies pain, denies SOB. Continues on RA w/ sats mid 90's. LS clear w/ fine crackles to the bases. BS audible/normal. Pt incontinent or bowel and urine. Did have small BM this shift. Small skin opening to coccyx. No drainage. Open to air. Redness still present to groin folds. Micatin powder applied. Repo q2h. IV SL. Up in chair for meals. Poor appetite as far as food goes, does drink her ensure and other beverages during meals. Refuses Nicotine patch. Assist x1 w/ gait belt and walker. Bed alarm active, call light within reach.     Face to face report given with opportunity to observe patient.    Report given to Micheline Pak RN   6/17/2022  3:22 PM      "

## 2022-06-18 ENCOUNTER — APPOINTMENT (OUTPATIENT)
Dept: PHYSICAL THERAPY | Facility: HOSPITAL | Age: 72
DRG: 177 | End: 2022-06-18
Payer: MEDICARE

## 2022-06-18 PROCEDURE — 99232 SBSQ HOSP IP/OBS MODERATE 35: CPT | Performed by: NURSE PRACTITIONER

## 2022-06-18 PROCEDURE — 97530 THERAPEUTIC ACTIVITIES: CPT | Mod: GP | Performed by: PHYSICAL THERAPIST

## 2022-06-18 PROCEDURE — 250N000013 HC RX MED GY IP 250 OP 250 PS 637: Performed by: NURSE PRACTITIONER

## 2022-06-18 PROCEDURE — 120N000001 HC R&B MED SURG/OB

## 2022-06-18 PROCEDURE — 250N000011 HC RX IP 250 OP 636: Performed by: INTERNAL MEDICINE

## 2022-06-18 PROCEDURE — 250N000013 HC RX MED GY IP 250 OP 250 PS 637: Performed by: INTERNAL MEDICINE

## 2022-06-18 RX ADMIN — ENOXAPARIN SODIUM 40 MG: 40 INJECTION SUBCUTANEOUS at 20:21

## 2022-06-18 RX ADMIN — CLONAZEPAM 0.5 MG: 0.5 TABLET ORAL at 20:20

## 2022-06-18 RX ADMIN — MICONAZOLE NITRATE: 2 POWDER TOPICAL at 20:23

## 2022-06-18 RX ADMIN — ATORVASTATIN CALCIUM 40 MG: 40 TABLET, FILM COATED ORAL at 20:19

## 2022-06-18 RX ADMIN — MICONAZOLE NITRATE: 2 POWDER TOPICAL at 08:49

## 2022-06-18 RX ADMIN — POTASSIUM CHLORIDE 20 MEQ: 750 CAPSULE, EXTENDED RELEASE ORAL at 20:20

## 2022-06-18 RX ADMIN — POTASSIUM CHLORIDE 20 MEQ: 750 CAPSULE, EXTENDED RELEASE ORAL at 08:48

## 2022-06-18 RX ADMIN — BUPROPION HYDROCHLORIDE 150 MG: 150 TABLET, FILM COATED, EXTENDED RELEASE ORAL at 08:48

## 2022-06-18 ASSESSMENT — ACTIVITIES OF DAILY LIVING (ADL)
ADLS_ACUITY_SCORE: 39
ADLS_ACUITY_SCORE: 39
ADLS_ACUITY_SCORE: 36
ADLS_ACUITY_SCORE: 36
ADLS_ACUITY_SCORE: 39
ADLS_ACUITY_SCORE: 36
ADLS_ACUITY_SCORE: 39
ADLS_ACUITY_SCORE: 39
ADLS_ACUITY_SCORE: 36
ADLS_ACUITY_SCORE: 36

## 2022-06-18 NOTE — PHARMACY-MEDICATION REGIMEN REVIEW
Pharmacy Antimicrobial Stewardship Assessment     Current Antimicrobial Therapy:  Anti-infectives (From now, onward)    Start     Dose/Rate Route Frequency Ordered Stop    22 1700  fluconazole (DIFLUCAN) tablet 100 mg         100 mg Oral EVERY 72 HOURS 22 1641            Indication: Topical Candidiasis    Days of Therapy: 6     Pertinent Labs:  Recent Labs   Lab Test 06/15/22  0615 22  0545 22  0607   WBC 6.3 7.3 8.3     Recent Labs   Lab Test 22  0535 22  1650   LACT  --  1.5   CRP 16.9* 16.4*   PCAL  --  0.26*        Temperature:  Temp (24hrs), Av.4  F (36.9  C), Min:97.8  F (36.6  C), Max:98.8  F (37.1  C)    Culture Results:   (22) Urine = Klebsiella + E.coli + Aerococcus pneumoniae    (22) COVID = positive    Recommendations/Interventions:  1. Fluconazole can further prolong QT interval; monitor closely.  2. Per notes it seems this is Cutaneous Candidiasis where dosing would be 100 mg daily per [facility] Guide:     Sen Goodwin, [facility]  2022

## 2022-06-18 NOTE — PLAN OF CARE
"/62 (BP Location: Left arm, Patient Position: Semi-Gaston's, Cuff Size: Adult Regular)   Pulse 101   Temp 98.7  F (37.1  C) (Tympanic)   Resp 18   Ht 1.575 m (5' 2\")   Wt 57.8 kg (127 lb 6.8 oz)   SpO2 94%   BMI 23.31 kg/m      Pt A&O, VSS, afebrile, and denies pain. On room air. Lung sounds are diminished with fine crackles in the bases. Cough is infrequent and congested. HR tachycardic. +1 dependent edema noted. Patient incont of urine. Turned Q2hrs and barrier cream applied to bottom. Rest of assessment as charted. IV patent and SL. Call light within reach and bed alarm on.    Face to face report given with opportunity to observe patient.    Report given to LON Hardy RN   6/18/2022  "

## 2022-06-18 NOTE — PROGRESS NOTES
[facility]    Hospitalist Progress Note    Date of Service (when I saw the patient): 06/18/2022    Assessment & Plan       Generalized muscle weakness: Initially there was concern for new neurological cause of weakness as it was worse in lower extremities, particularly the left lower extremity. However, MRI was done on entire spine that showed no acute changes. She does have degenerative anterolisthesis of L4 relative to L5 with severe bilateral facet joint degenerative change. Anterolisthesis results in uncovering of the posterior margins of the disc, mildly narrowing the central canal and moderately narrowing the lateral recesses with slight mass effect upon the intrathecal andexiting L5 nerve roots, worse on the left. Today she was able to stand with minimal assist and sit in the chair. She denies any back pain. Continue PT/OT.  -6/14: Continue therapy and increased activity   -6/17: Agrees to get up to cahir x30min for each meal      Acute cystitis without hematuria: UC positive for aeurococcus, klebsiella, e. Coli. Covered by the Rocephin started on arrival and now transitioned to Omnicef which will cover all 3.   -6/15: Course complete      COVID-19 infection: Positive on arrival. She did present with some hypoxia. However, no cough, no fevers despite UTI. Supportive cares.       Acute respiratory failure with hypoxia: Mild. Present on arrival. Required 1 liter of oxygen on arrival, then increased to 2-3. However she was successfully weaned off today. Due to prolonged period of inactivity causing atelectasis vs due to bilateral effusions. She has not fevers, no leukocytosis so bacterial pneumonia unlikely. CT chest does show effusions and LLL atelectasis vs pneumonia.   -6/14: Lung sounds improved, she did need 1 liter of oxygen overnight, unknown if this is new due to covid or chronic (most likely). Will plan on discharge with NOC oxygen.   -6/15: Has not required oxygen overnight  -6/17:  Resolved. Has not required oxygen in several days, including overnight.       Severe malnutrition (H): Recent weight loss, very poor oral intake since arrival. Encouraging oral intake, supplements.   -6/14: She states she does not like pureed diet (previously requested this per speech) so will see if speech can advance her. Likely she will do fine-she does have very poor dentition and does not wear her dentures due to ill fit-but his is very chronic for her.  -6/17: Eating better no that she is on bite sized meals         Depression: Flat affect, making statements about difficult living at her age. She apparently sat in her recliner for 2 weeks straight at home prior to coming in. She has known history of anxiety that she takes clonazepam for at home. Start on wellbutrin-avoiding selective serotonin reuptake inhibitor due to her underlying prolonged QT.   -6/16: Improved mood noted over the last 2 days. She is more animated, good eye contact, excited to be removed from isolation when quarantine complete.       Chronic low back pain: Declines having any pain.       Tobacco abuse:Chronic, nicotine replacement.       Alcohol use: Chronic      DVT Prophylaxis: Enoxaparin (Lovenox) SQ  Code Status: Full Code    Disposition: Expected discharge in 2-3 days once placement found.    Rani Bundy CNP    Interval History    Alert and conversant.  Denies chest pain, dyspnea, abdominal pain.    -Data reviewed today: I reviewed all new labs and imaging results over the last 24 hours.     Physical Exam   Temp: 98.1  F (36.7  C) Temp src: Tympanic BP: 115/67 Pulse: 109   Resp: 18 SpO2: 95 % O2 Device: None (Room air)    Vitals:    06/12/22 0513 06/13/22 0500 06/14/22 0415   Weight: 58.1 kg (128 lb 1.4 oz) 58.4 kg (128 lb 12 oz) 57.8 kg (127 lb 6.8 oz)     Vital Signs with Ranges  Temp:  [97.8  F (36.6  C)-98.8  F (37.1  C)] 98.1  F (36.7  C)  Pulse:  [101-113] 109  Resp:  [16-18] 18  BP: ()/(47-82) 115/67  SpO2:  [94  %-98 %] 95 %  I/O last 3 completed shifts:  In: 483 [P.O.:480; I.V.:3]  Out: -     Peripheral IV 06/13/22 Right Hand (Active)   Site Assessment WDL 06/18/22 0732   Line Status Saline locked 06/18/22 0732   Phlebitis Scale 0-->no symptoms 06/18/22 0732   Infiltration Scale 0 06/18/22 0732   Infiltration Site Treatment Method  None 06/17/22 1551   If infiltrated, was a vesicant infusing? No 06/17/22 1551   Number of days: 5       Wound Coccyx (Active)   Wound Bed Pale;Red;Moist 06/18/22 0723   Anahy-wound Assessment Erythema;Excoriated 06/18/22 0723   Drainage Amount None 06/18/22 0723   Wound Care/Cleansing Barrier applied  06/18/22 0723   Dressing Open to air 06/18/22 0723   Number of days: 11     Line/device assessment(s) completed for medical necessity    Constitutional: Awake,alert, no acute distress  Respiratory: Clear but diminished throughout, no wheezes, rhonchi.  Cardiovascular: HRR, no murmurs, rubs,thrills.   GI: Soft,nontender,bowel sounds positive.   Skin/Integumen: No rashes, open areas or unusual bruises.       Medications       [Held by provider] amLODIPine  10 mg Oral Daily     atorvastatin  40 mg Oral QPM     buPROPion  150 mg Oral Daily     enoxaparin ANTICOAGULANT  40 mg Subcutaneous Q24H     fluconazole  100 mg Oral Q72H     miconazole   Topical BID     nicotine  1 patch Transdermal Daily     nicotine   Transdermal Q8H     potassium chloride ER  20 mEq Oral BID     sodium chloride (PF)  3 mL Intracatheter Q8H     sodium chloride (PF)  5 mL Intravenous Q8H       Data   Recent Labs   Lab 06/16/22  0549 06/15/22  0615 06/14/22  0545 06/13/22  0607 06/12/22  0628   WBC  --  6.3 7.3 8.3 7.9   HGB  --  8.6* 8.2* 8.5* 9.1*   MCV  --  95 96 94 92    440 376 426 404   NA  --  139 138 140 140   POTASSIUM  --  3.8 3.9 3.7 3.5   CHLORIDE  --  108 108 107 108   CO2  --  26 26 28 28   BUN  --  6* 6* 4* 2*   CR 0.33* 0.32* 0.29* 0.30* 0.32*   ANIONGAP  --  5 4 5 4   ROSA  --  8.2* 8.0* 7.9* 7.7*   GLC  --   88 96 88 93   ALBUMIN  --   --   --  1.4* 1.3*       No results found for this or any previous visit (from the past 24 hour(s)).

## 2022-06-18 NOTE — PLAN OF CARE
"Goal Outcome Evaluation: Progressing   Reason for hospital stay:  Generalized weakness  Living situation PTA: Lived at home by herself.   Most recent vitals: /82 (BP Location: Left arm, Patient Position: Semi-Gaston's, Cuff Size: Adult Regular)   Pulse 112   Temp 98.8  F (37.1  C) (Tympanic)   Resp 18   Ht 1.575 m (5' 2\")   Wt 57.8 kg (127 lb 6.8 oz)   SpO2 94%   BMI 23.31 kg/m      Pain Management:  Pt denies having pain.   LOC:  Alert and orientated to person, place, time, and situation.   Cardiac:  Apical pulse irregular. Tachycardic. MD is aware of this.   Respiratory:  Uppers:Clear  Lowers:Fine crackles. Infrequent, congested cough.   GI:  All quadrants audible and normoactive.   :  Incontinence of stool and urine.   Skin Issues:  Skin is warm, dry, and pale. Redness blanchable buttock/IT. Buttock/IT area is healing nicely. Skin barrier is applied liberally and seems to be doing the trick. Groin folds are not red and healed over. Powder was put in the groin folds.     IVF:  None. IV access right hand. Hand is very sore. No redness. Flush gently. Flushes well.   ABX:  None.     Nutrition: Poor. Soft and bite sized diet. Pt had a cup of coffee and 2 ensures for supper. She did not want anything else to eat because she said her teeth/mouth hurt.   ADL's:  Up in the chair for 30 minutes to have her Ensures.   Ambulation: Assist x 1 with use of gait belt and walker. Pt is stronger than she has been in the past. She is helping turn herself (pulling on the upper bed rail) when we turn her every 2 hours.   Safety:  Bed in the low position, call light within reach, ID band in place, personal items within reach, free from falls, use of call light appropriately, and makes needs known.     Comments: Vitally stable with the exception of being tachycardic. Afebrile. She asked that her door be left open during the shift. I have gone in quite a bit this evening to sit and chat with her. I did tell her that she " "could have visitors and she started crying. When asked why she was crying she stated, \"It's been a month since I've had any form of visitors.\" Placement will be at [facility] on Tuesday (6/21/2022). Face to face report given with opportunity to observe patient.    Report given to LON Almonte.     Ester Cuevas RN   6/17/2022  11:25 PM                         "

## 2022-06-18 NOTE — PROGRESS NOTES
"   06/18/22 0919   Signing Clinician's Name / Credentials   Signing clinician's name / credentials Adamaris Taylor DPT   Quick Adds   Rehab Discipline PT   Therapeutic Activity   Minutes of Treatment 17 minutes   Symptoms Noted During/After Treatment Fatigue   Treatment Detail Pt in bed upon PT arrival.  Pt agreed to PT with encouragement.  Pt transferred sup>sit CGA from EOB.  Pt ambulated 100' with FWW CGA-min A, slow [place] but improved from previous sessions.  Pt declined rest break stating \"I just want to get this over with\".  O2 sats 96%,  with activity.  Upon approaching room chair, pt attempting to again sit without properly positioned in front of chair, needs cues to stop and position self in front of chair before sitting.  Pt refused to participate in any further exercises stating \"I don't want to and I'm not doing anything I don't want to\".  Pt left sitting up in chair with call light and clip alarm in place.   PT Discharge Planning   PT Discharge Recommendation (DC Rec) Transitional Care Facility   PT Rationale for DC Rec Pt still demonstrates limited activity tolerance and weakness, not safe to return home.   PT Brief overview of current status Pt in bed upon PT arrival.  Pt agreed to PT with encouragement.  Pt transferred sup>sit CGA from EOB.  Pt ambulated 100' with FWW CGA-min A, slow [place] but improved from previous sessions.  Pt declined rest break stating \"I just want to get this over with\".  O2 sats 96%,  with activity.  Upon approaching room chair, pt attempting to again sit without properly positioned in front of chair, needs cues to stop and position self in front of chair before sitting.  Pt refused to participate in any further exercises stating \"I don't want to and I'm not doing anything I don't want to\".  Pt left sitting up in chair with call light and clip alarm in place.   Additional Documentation   Rehab Comments improved ambulation distance   PT Plan Progress mobility and " strength as able   Total Session Time   Total Session Time (minutes) 17 minutes

## 2022-06-18 NOTE — PLAN OF CARE
"Most recent vitals: /63 (BP Location: Left arm, Patient Position: Semi-Gaston's)   Pulse 103   Temp 97.8  F (36.6  C) (Tympanic)   Resp 18   Ht 1.575 m (5' 2\")   Wt 57.8 kg (127 lb 6.8 oz)   SpO2 95%   BMI 23.31 kg/m      VSS, afebrile, on RA with sats mid 90's. Complaints of pain to right hip rating 8/10. Repositioning used for pain relief. Appetite continues to be poor, but drinking fluids well. LS dim w/ fine crackles to the bases. BS normal/active. Continues to be incontinent of urine. Repo q2h. Barrier cream applied to bottom during changes. IV SL. Call light within reach,makes needs known.       Face to face report given with opportunity to observe patient.    Report given to Ella Pak RN   6/18/2022  3:07 PM      "

## 2022-06-19 LAB
CHOLEST SERPL-MCNC: 131 MG/DL
CREAT SERPL-MCNC: 0.34 MG/DL (ref 0.52–1.04)
GFR SERPL CREATININE-BSD FRML MDRD: >90 ML/MIN/1.73M2
HDLC SERPL-MCNC: 43 MG/DL
LDLC SERPL CALC-MCNC: 48 MG/DL
MAGNESIUM SERPL-MCNC: 2.1 MG/DL (ref 1.6–2.3)
NONHDLC SERPL-MCNC: 88 MG/DL
PHOSPHATE SERPL-MCNC: 3.2 MG/DL (ref 2.5–4.5)
PLATELET # BLD AUTO: 388 10E3/UL (ref 150–450)
POTASSIUM BLD-SCNC: 3.8 MMOL/L (ref 3.4–5.3)
PREALB SERPL IA-MCNC: 16 MG/DL (ref 15–45)
TRIGL SERPL-MCNC: 202 MG/DL

## 2022-06-19 PROCEDURE — 84100 ASSAY OF PHOSPHORUS: CPT | Performed by: INTERNAL MEDICINE

## 2022-06-19 PROCEDURE — 80061 LIPID PANEL: CPT | Performed by: INTERNAL MEDICINE

## 2022-06-19 PROCEDURE — 250N000013 HC RX MED GY IP 250 OP 250 PS 637: Performed by: INTERNAL MEDICINE

## 2022-06-19 PROCEDURE — 84132 ASSAY OF SERUM POTASSIUM: CPT | Performed by: INTERNAL MEDICINE

## 2022-06-19 PROCEDURE — 82565 ASSAY OF CREATININE: CPT | Performed by: INTERNAL MEDICINE

## 2022-06-19 PROCEDURE — 83735 ASSAY OF MAGNESIUM: CPT | Performed by: INTERNAL MEDICINE

## 2022-06-19 PROCEDURE — 250N000011 HC RX IP 250 OP 636: Performed by: INTERNAL MEDICINE

## 2022-06-19 PROCEDURE — 84134 ASSAY OF PREALBUMIN: CPT | Performed by: INTERNAL MEDICINE

## 2022-06-19 PROCEDURE — 99232 SBSQ HOSP IP/OBS MODERATE 35: CPT | Performed by: INTERNAL MEDICINE

## 2022-06-19 PROCEDURE — 250N000013 HC RX MED GY IP 250 OP 250 PS 637: Performed by: NURSE PRACTITIONER

## 2022-06-19 PROCEDURE — 85049 AUTOMATED PLATELET COUNT: CPT | Performed by: INTERNAL MEDICINE

## 2022-06-19 PROCEDURE — 120N000001 HC R&B MED SURG/OB

## 2022-06-19 PROCEDURE — 36415 COLL VENOUS BLD VENIPUNCTURE: CPT | Performed by: INTERNAL MEDICINE

## 2022-06-19 RX ADMIN — CLONAZEPAM 0.5 MG: 0.5 TABLET ORAL at 22:38

## 2022-06-19 RX ADMIN — ENOXAPARIN SODIUM 40 MG: 40 INJECTION SUBCUTANEOUS at 22:38

## 2022-06-19 RX ADMIN — MICONAZOLE NITRATE: 2 POWDER TOPICAL at 22:40

## 2022-06-19 RX ADMIN — BUPROPION HYDROCHLORIDE 150 MG: 150 TABLET, FILM COATED, EXTENDED RELEASE ORAL at 08:56

## 2022-06-19 RX ADMIN — POTASSIUM CHLORIDE 20 MEQ: 750 CAPSULE, EXTENDED RELEASE ORAL at 08:56

## 2022-06-19 RX ADMIN — POTASSIUM CHLORIDE 20 MEQ: 750 CAPSULE, EXTENDED RELEASE ORAL at 22:38

## 2022-06-19 RX ADMIN — ATORVASTATIN CALCIUM 40 MG: 40 TABLET, FILM COATED ORAL at 22:38

## 2022-06-19 RX ADMIN — FLUCONAZOLE 100 MG: 100 TABLET ORAL at 16:57

## 2022-06-19 RX ADMIN — MICONAZOLE NITRATE: 2 POWDER TOPICAL at 08:56

## 2022-06-19 ASSESSMENT — ACTIVITIES OF DAILY LIVING (ADL)
ADLS_ACUITY_SCORE: 39

## 2022-06-19 NOTE — PROGRESS NOTES
[facility]    Medicine Progress Note - Hospitalist Service    Date of Admission:  6/7/2022    Assessment & Plan           Generalized muscle weakness:   Initially there was concern for new neurological cause of weakness as it was worse in lower extremities, particularly the left lower extremity. However, MRI was done on entire spine that showed no acute changes. She does have degenerative anterolisthesis of L4 relative to L5 with severe bilateral facet joint degenerative change. Anterolisthesis results in uncovering of the posterior margins of the disc, mildly narrowing the central canal and moderately narrowing the lateral recesses with slight mass effect upon the intrathecal andexiting L5 nerve roots, worse on the left. Today she was able to stand with minimal assist and sit in the chair. She denies any back pain. Continue PT/OT.  -6/14: Continue therapy and increased activity   -6/17: Agrees to get up to cahir x30min for each meal  -June 19: Patient feels that she is getting stronger and my exam shows much stronger muscle strength compared to 1-1/2 weeks ago.  Continue with the PT and OT      Acute cystitis without hematuria:   UC positive for aeurococcus, klebsiella, e. Coli. Covered by the Rocephin started on arrival and now transitioned to Omnicef which will cover all 3.   -6/15: Course complete      COVID-19 infection:   Positive on arrival. She did present with some hypoxia. However, no cough, no fevers despite UTI. Supportive cares.       Acute respiratory failure with hypoxia:   Mild. Present on arrival. Required 1 liter of oxygen on arrival, then increased to 2-3. However she was successfully weaned off today. Due to prolonged period of inactivity causing atelectasis vs due to bilateral effusions. She has not fevers, no leukocytosis so bacterial pneumonia unlikely. CT chest does show effusions and LLL atelectasis vs pneumonia.   -6/14: Lung sounds improved, she did need 1 liter of oxygen  overnight, unknown if this is new due to covid or chronic (most likely). Will plan on discharge with NOC oxygen.   -6/15: Has not required oxygen overnight  -6/17: Resolved. Has not required oxygen in several days, including overnight.  -June 19: Stable respiratory status      Severe malnutrition (H):   Recent weight loss, very poor oral intake since arrival. Encouraging oral intake, supplements.   -6/14: She states she does not like pureed diet (previously requested this per speech) so will see if speech can advance her. Likely she will do fine-she does have very poor dentition and does not wear her dentures due to ill fit-but his is very chronic for her.  -6/17: Eating better no that she is on bite sized meals     -June 19: Patient reports she is eating better.  We will check set of labs tomorrow      Depression:   Flat affect, making statements about difficult living at her age. She apparently sat in her recliner for 2 weeks straight at home prior to coming in. She has known history of anxiety that she takes clonazepam for at home. Start on wellbutrin-avoiding selective serotonin reuptake inhibitor due to her underlying prolonged QT.   -6/16: Improved mood noted over the last 2 days. She is more animated, good eye contact, excited to be removed from isolation when quarantine complete.  -June 19: Much improved mood.      Chronic low back pain:   Declines having any pain.       Tobacco abuse:  Chronic, nicotine replacement.       Alcohol use:   Chronic       Diet: Soft & Bite Sized Diet (level 6) Thin Liquids (level 0)    DVT Prophylaxis: Enoxaparin (Lovenox) SQ  Jain Catheter: Not present  Central Lines: None  Cardiac Monitoring: None  Code Status: Full Code      Disposition Plan   Expected Discharge:  plan to discharge to TCU on Tuesday  Anticipated discharge location:  Awaiting care coordination huddle  Delays: { TIP  Update expected discharge date and delays and refresh note (tipsheet)     :[id number]}           The patient's care was discussed with the Patient.    Karthik Benton MD  Hospitalist Service  [facility]  Securely message with the [employer] Web Console (learn more here)  Text page via [employer] Paging/Directory         Clinically Significant Risk Factors Present on Admission                    ______________________________________________________________________    Interval History   Patient reports no new complaints.  She feels that she is getting stronger and progressing with the therapy sessions.  Currently she ambulates with a walker with contact-guard to min assist.     Data reviewed today: I reviewed all medications, new labs and imaging results over the last 24 hours. I personally reviewed no images or EKG's today.    Physical Exam   Vital Signs: Temp: 97.6  F (36.4  C) Temp src: Tympanic BP: 107/61 Pulse: 107   Resp: 16 SpO2: 97 % O2 Device: None (Room air)    Weight: 127 lbs 6.81 oz  General Appearance: Lying in bed in no acute distress  Respiratory: Clear to auscultation bilaterally  Cardiovascular: S1-S2, regular rhythm and rate, no murmurs rubs gallops  GI: Soft, nontender, nondistended  Skin: Intact  Other: Neuro: Bilateral weakness in the upper extremity with pushing away movement.  Lower extremity weakness bilaterally 4/5.  Babinski noted on the right side    Data   Recent Labs   Lab 06/19/22  0539 06/16/22  0549 06/15/22  0615 06/14/22  0545 06/13/22  0607   WBC  --   --  6.3 7.3 8.3   HGB  --   --  8.6* 8.2* 8.5*   MCV  --   --  95 96 94    369 440 376 426   NA  --   --  139 138 140   POTASSIUM 3.8  --  3.8 3.9 3.7   CHLORIDE  --   --  108 108 107   CO2  --   --  26 26 28   BUN  --   --  6* 6* 4*   CR 0.34* 0.33* 0.32* 0.29* 0.30*   ANIONGAP  --   --  5 4 5   ROSA  --   --  8.2* 8.0* 7.9*   GLC  --   --  88 96 88   ALBUMIN  --   --   --   --  1.4*     No results found for this or any previous visit (from the past 24 hour(s)).  Medications       [Held by provider] amLODIPine  10 mg Oral  Daily     atorvastatin  40 mg Oral QPM     buPROPion  150 mg Oral Daily     enoxaparin ANTICOAGULANT  40 mg Subcutaneous Q24H     fluconazole  100 mg Oral Q72H     miconazole   Topical BID     nicotine  1 patch Transdermal Daily     nicotine   Transdermal Q8H     potassium chloride ER  20 mEq Oral BID     sodium chloride (PF)  3 mL Intracatheter Q8H     sodium chloride (PF)  5 mL Intravenous Q8H

## 2022-06-19 NOTE — PLAN OF CARE
"/66 (BP Location: Left arm, Patient Position: Semi-Gaston's, Cuff Size: Adult Regular)   Pulse 105   Temp 97.7  F (36.5  C) (Tympanic)   Resp 16   Ht 1.575 m (5' 2\")   Wt 57.8 kg (127 lb 6.8 oz)   SpO2 95%   BMI 23.31 kg/m      Pt A&O, VSS, afebrile, and denies pain. Patient incont of urine x2. Turned Q2hrs and barrier cream applied to bottom. Rest of assessment as charted. IV patent and SL. Call light within reach and bed alarm on.    Face to face report given with opportunity to observe patient.    Report given to LON Hardy RN   6/19/2022  "

## 2022-06-19 NOTE — PLAN OF CARE
"Free from falls/injuries this shift. Has been inc of urine in brief, then calls staff to be changed. States she is not aware when she is actually passing urine. Absolutely refused to sit in chair for 30 minutes this shift. Started to get angry with staff when encouragement given. States: \" I just don't feel like it. I shouldn't have to get up if I don't feel like it\". Carafe of coffee at bedside. Does ask staff for assist to fill her cup. Had 1 Ensure this evening. Drinks that without encouragement.     Face to face report given with opportunity to observe patient.    Report given to Suzy Agarwal RN   6/18/2022  11:19 PM                      "

## 2022-06-19 NOTE — PLAN OF CARE
"Most recent vitals: /61 (BP Location: Left arm)   Pulse 107   Temp 97.6  F (36.4  C) (Tympanic)   Resp 16   Ht 1.575 m (5' 2\")   Wt 57.8 kg (127 lb 6.8 oz)   SpO2 97%   BMI 23.31 kg/m      VSS, afebrile, denies pain. LS clear, BS clear. Incontinent of bowel and bladder. Barrier cream applied during incontinence care. IV SL. No mauricio patch. Appetite poor. Continues to drink ensures at meal times. Up in chair for meals. IV SL. Bilateral rash still present where IV's were placed in AC. Barrier cream applied. Call light within reach, makes needs known.     Face to face report given with opportunity to observe patient.    Report given to Ella Pak RN   6/19/2022  3:09 PM        "

## 2022-06-20 ENCOUNTER — APPOINTMENT (OUTPATIENT)
Dept: PHYSICAL THERAPY | Facility: HOSPITAL | Age: 72
DRG: 177 | End: 2022-06-20
Payer: MEDICARE

## 2022-06-20 LAB
ALBUMIN SERPL-MCNC: 2.1 G/DL (ref 3.4–5)
ALP SERPL-CCNC: 161 U/L (ref 40–150)
ALT SERPL W P-5'-P-CCNC: 15 U/L (ref 0–50)
ANION GAP SERPL CALCULATED.3IONS-SCNC: 4 MMOL/L (ref 3–14)
AST SERPL W P-5'-P-CCNC: 17 U/L (ref 0–45)
BILIRUB DIRECT SERPL-MCNC: 0.1 MG/DL (ref 0–0.2)
BILIRUB SERPL-MCNC: 0.3 MG/DL (ref 0.2–1.3)
BUN SERPL-MCNC: 6 MG/DL (ref 7–30)
CALCIUM SERPL-MCNC: 8.6 MG/DL (ref 8.5–10.1)
CHLORIDE BLD-SCNC: 106 MMOL/L (ref 94–109)
CO2 SERPL-SCNC: 25 MMOL/L (ref 20–32)
CREAT SERPL-MCNC: 0.34 MG/DL (ref 0.52–1.04)
ERYTHROCYTE [DISTWIDTH] IN BLOOD BY AUTOMATED COUNT: 16.8 % (ref 10–15)
GFR SERPL CREATININE-BSD FRML MDRD: >90 ML/MIN/1.73M2
GLUCOSE BLD-MCNC: 93 MG/DL (ref 70–99)
HCT VFR BLD AUTO: 28.9 % (ref 35–47)
HGB BLD-MCNC: 9.6 G/DL (ref 11.7–15.7)
MAGNESIUM SERPL-MCNC: 2.1 MG/DL (ref 1.6–2.3)
MCH RBC QN AUTO: 32.5 PG (ref 26.5–33)
MCHC RBC AUTO-ENTMCNC: 33.2 G/DL (ref 31.5–36.5)
MCV RBC AUTO: 98 FL (ref 78–100)
PHOSPHATE SERPL-MCNC: 2.8 MG/DL (ref 2.5–4.5)
PLATELET # BLD AUTO: 436 10E3/UL (ref 150–450)
POTASSIUM BLD-SCNC: 3.8 MMOL/L (ref 3.4–5.3)
PROT SERPL-MCNC: 6.5 G/DL (ref 6.8–8.8)
RBC # BLD AUTO: 2.95 10E6/UL (ref 3.8–5.2)
SODIUM SERPL-SCNC: 135 MMOL/L (ref 133–144)
WBC # BLD AUTO: 6.3 10E3/UL (ref 4–11)

## 2022-06-20 PROCEDURE — 250N000013 HC RX MED GY IP 250 OP 250 PS 637: Performed by: NURSE PRACTITIONER

## 2022-06-20 PROCEDURE — 82248 BILIRUBIN DIRECT: CPT | Performed by: INTERNAL MEDICINE

## 2022-06-20 PROCEDURE — 36415 COLL VENOUS BLD VENIPUNCTURE: CPT | Performed by: INTERNAL MEDICINE

## 2022-06-20 PROCEDURE — 84075 ASSAY ALKALINE PHOSPHATASE: CPT | Performed by: INTERNAL MEDICINE

## 2022-06-20 PROCEDURE — 120N000001 HC R&B MED SURG/OB

## 2022-06-20 PROCEDURE — 83735 ASSAY OF MAGNESIUM: CPT | Performed by: INTERNAL MEDICINE

## 2022-06-20 PROCEDURE — 250N000011 HC RX IP 250 OP 636: Performed by: INTERNAL MEDICINE

## 2022-06-20 PROCEDURE — 80053 COMPREHEN METABOLIC PANEL: CPT | Performed by: INTERNAL MEDICINE

## 2022-06-20 PROCEDURE — 99232 SBSQ HOSP IP/OBS MODERATE 35: CPT | Performed by: INTERNAL MEDICINE

## 2022-06-20 PROCEDURE — 84100 ASSAY OF PHOSPHORUS: CPT | Performed by: INTERNAL MEDICINE

## 2022-06-20 PROCEDURE — 84450 TRANSFERASE (AST) (SGOT): CPT | Performed by: INTERNAL MEDICINE

## 2022-06-20 PROCEDURE — 84460 ALANINE AMINO (ALT) (SGPT): CPT | Performed by: INTERNAL MEDICINE

## 2022-06-20 PROCEDURE — 97530 THERAPEUTIC ACTIVITIES: CPT | Mod: GP | Performed by: PHYSICAL THERAPIST

## 2022-06-20 PROCEDURE — 85027 COMPLETE CBC AUTOMATED: CPT | Performed by: INTERNAL MEDICINE

## 2022-06-20 PROCEDURE — 250N000013 HC RX MED GY IP 250 OP 250 PS 637: Performed by: INTERNAL MEDICINE

## 2022-06-20 RX ADMIN — ATORVASTATIN CALCIUM 40 MG: 40 TABLET, FILM COATED ORAL at 20:48

## 2022-06-20 RX ADMIN — CLONAZEPAM 0.5 MG: 0.5 TABLET ORAL at 20:48

## 2022-06-20 RX ADMIN — MICONAZOLE NITRATE: 2 POWDER TOPICAL at 20:51

## 2022-06-20 RX ADMIN — MICONAZOLE NITRATE: 2 POWDER TOPICAL at 09:41

## 2022-06-20 RX ADMIN — POTASSIUM CHLORIDE 20 MEQ: 750 CAPSULE, EXTENDED RELEASE ORAL at 20:48

## 2022-06-20 RX ADMIN — POTASSIUM CHLORIDE 20 MEQ: 750 CAPSULE, EXTENDED RELEASE ORAL at 09:41

## 2022-06-20 RX ADMIN — BUPROPION HYDROCHLORIDE 150 MG: 150 TABLET, FILM COATED, EXTENDED RELEASE ORAL at 09:41

## 2022-06-20 RX ADMIN — ENOXAPARIN SODIUM 40 MG: 40 INJECTION SUBCUTANEOUS at 21:10

## 2022-06-20 ASSESSMENT — ACTIVITIES OF DAILY LIVING (ADL)
ADLS_ACUITY_SCORE: 39

## 2022-06-20 NOTE — PROGRESS NOTES
06/20/22 1235   Signing Clinician's Name / Credentials   Signing clinician's name / credentials Adamaris Taylor DPT   Quick Adds   Rehab Discipline PT   Therapeutic Activity   Minutes of Treatment 18 minutes   Symptoms Noted During/After Treatment Fatigue;Shortness of breath   Treatment Detail Pt resting in bed, agreeable to ambulate.  Pt transferred sup>sit SBA with use of siderail and slow to make transition.  Pt transferred sit>stand min A from EOB.  Pt ambulated 50' with FWW min A with 1 major LOB and fatigue, unwilling to ambulate further before resting.  After seated rest break, pt ambulated additional 15' with FWW CGA, min A required upon approaching room chair due to LOB while turning.  Pt's O2 sats 96%,  with activity.  Pt completed repeated sit<>stands x3 reps from chair then refused to do anything additional.  Pt left in chair with call light in reach and clip alarm on.   PT Discharge Planning   PT Discharge Recommendation (DC Rec) Transitional Care Facility   PT Rationale for DC Rec Limited activity tolerance, instability with gait, not safe to return home.   PT Brief overview of current status Pt resting in bed, agreeable to ambulate.  Pt transferred sup>sit SBA with use of siderail and slow to make transition.  Pt transferred sit>stand min A from EOB.  Pt ambulated 50' with FWW min A with 1 major LOB and fatigue, unwilling to ambulate further before resting.  After seated rest break, pt ambulated additional 15' with FWW CGA, min A required upon approaching room chair due to LOB while turning.  Pt's O2 sats 96%,  with activity.  Pt completed repeated sit<>stands x3 reps from chair then refused to do anything additional.  Pt left in chair with call light in reach and clip alarm on.   Additional Documentation   Rehab Comments limited activity tolerance today   PT Plan Progress mobility and strength as able   Total Session Time   Total Session Time (minutes) 18 minutes

## 2022-06-20 NOTE — PROGRESS NOTES
[facility]    Medicine Progress Note - Hospitalist Service    Date of Admission:  6/7/2022    Assessment & Plan           Generalized muscle weakness:   Initially there was concern for new neurological cause of weakness as it was worse in lower extremities, particularly the left lower extremity. However, MRI was done on entire spine that showed no acute changes. She does have degenerative anterolisthesis of L4 relative to L5 with severe bilateral facet joint degenerative change. Anterolisthesis results in uncovering of the posterior margins of the disc, mildly narrowing the central canal and moderately narrowing the lateral recesses with slight mass effect upon the intrathecal andexiting L5 nerve roots, worse on the left. Today she was able to stand with minimal assist and sit in the chair. She denies any back pain. Continue PT/OT.  -6/14: Continue therapy and increased activity   -6/17: Agrees to get up to cahir x30min for each meal  -June 19: Patient feels that she is getting stronger and my exam shows much stronger muscle strength compared to 1-1/2 weeks ago.  Continue with the PT and OT  -June 20: Patient ambulated 50 feet with minimal assistance of 1.  She is requiring frequent resting but is improving in her functional status..      Acute cystitis without hematuria:   UC positive for aeurococcus, klebsiella, e. Coli. Covered by the Rocephin started on arrival and now transitioned to Omnicef which will cover all 3.   -6/15: Course complete      COVID-19 infection:   Positive on arrival. She did present with some hypoxia. However, no cough, no fevers despite UTI. Supportive cares.       Acute respiratory failure with hypoxia:   Mild. Present on arrival. Required 1 liter of oxygen on arrival, then increased to 2-3. However she was successfully weaned off today. Due to prolonged period of inactivity causing atelectasis vs due to bilateral effusions. She has not fevers, no leukocytosis so bacterial  pneumonia unlikely. CT chest does show effusions and LLL atelectasis vs pneumonia.   -6/14: Lung sounds improved, she did need 1 liter of oxygen overnight, unknown if this is new due to covid or chronic (most likely). Will plan on discharge with NOC oxygen.   -6/15: Has not required oxygen overnight  -6/17: Resolved. Has not required oxygen in several days, including overnight.  -June 19: Stable respiratory status  -June 28: Stable respiratory status      Severe malnutrition (H):   Recent weight loss, very poor oral intake since arrival. Encouraging oral intake, supplements.   -6/14: She states she does not like pureed diet (previously requested this per speech) so will see if speech can advance her. Likely she will do fine-she does have very poor dentition and does not wear her dentures due to ill fit-but his is very chronic for her.  -6/17: Eating better no that she is on bite sized meals     -June 19: Patient reports she is eating better.  We will check set of labs tomorrow  -June 20: Patient's albumin level has improved and prealbumin is within normal limit.  It appears that her nutritional status has improved significantly during this hospitalization.      Depression:   Flat affect, making statements about difficult living at her age. She apparently sat in her recliner for 2 weeks straight at home prior to coming in. She has known history of anxiety that she takes clonazepam for at home. Start on wellbutrin-avoiding selective serotonin reuptake inhibitor due to her underlying prolonged QT.   -6/16: Improved mood noted over the last 2 days. She is more animated, good eye contact, excited to be removed from isolation when quarantine complete.  -June 19: Much improved mood.      Chronic low back pain:   Declines having any pain.       Tobacco abuse:  Chronic, nicotine replacement.       Alcohol use:   Chronic       Diet: Soft & Bite Sized Diet (level 6) Thin Liquids (level 0)    DVT Prophylaxis: Enoxaparin  (Lovenox) SQ  Jain Catheter: Not present  Central Lines: None  Cardiac Monitoring: None  Code Status: Full Code      Disposition Plan   Expected Discharge:  plan to discharge to TCU on Tuesday  Anticipated discharge location:  Awaiting care coordination huddle  Delays:           The patient's care was discussed with the Patient.    Karthik Benton MD  Hospitalist Service  [facility]  Securely message with the [employer] Web Console (learn more here)  Text page via [employer] Paging/Directory         Clinically Significant Risk Factors Present on Admission                    ______________________________________________________________________    Interval History   No new complaints.  Looking forward to discharge tomorrow    Data reviewed today: I reviewed all medications, new labs and imaging results over the last 24 hours. I personally reviewed no images or EKG's today.    Physical Exam   Vital Signs: Temp: 98.7  F (37.1  C) Temp src: Tympanic BP: 115/68 Pulse: 108   Resp: 16 SpO2: 97 % O2 Device: None (Room air)    Weight: 127 lbs 6.81 oz  General Appearance: Lying in bed in no acute distress  Respiratory: Clear to auscultation bilaterally  Cardiovascular: S1-S2, regular rhythm and rate, no murmurs rubs gallops  GI: Soft, nontender, nondistended  Skin: Intact  Other: Neuro: Bilateral weakness in the upper extremity with pushing away movement.  Lower extremity weakness bilaterally 4/5.  Babinski noted on the right side    Data   Recent Labs   Lab 06/20/22  0551 06/19/22  0539 06/16/22  0549 06/15/22  0615 06/14/22  0545   WBC 6.3  --   --  6.3 7.3   HGB 9.6*  --   --  8.6* 8.2*   MCV 98  --   --  95 96    388 369 440 376     --   --  139 138   POTASSIUM 3.8 3.8  --  3.8 3.9   CHLORIDE 106  --   --  108 108   CO2 25  --   --  26 26   BUN 6*  --   --  6* 6*   CR 0.34* 0.34* 0.33* 0.32* 0.29*   ANIONGAP 4  --   --  5 4   ROSA 8.6  --   --  8.2* 8.0*   GLC 93  --   --  88 96   ALBUMIN 2.1*  --   --   --    --    PROTTOTAL 6.5*  --   --   --   --    BILITOTAL 0.3  --   --   --   --    ALKPHOS 161*  --   --   --   --    ALT 15  --   --   --   --    AST 17  --   --   --   --      No results found for this or any previous visit (from the past 24 hour(s)).  Medications       [Held by provider] amLODIPine  10 mg Oral Daily     atorvastatin  40 mg Oral QPM     buPROPion  150 mg Oral Daily     enoxaparin ANTICOAGULANT  40 mg Subcutaneous Q24H     fluconazole  100 mg Oral Q72H     miconazole   Topical BID     nicotine  1 patch Transdermal Daily     nicotine   Transdermal Q8H     potassium chloride ER  20 mEq Oral BID     sodium chloride (PF)  3 mL Intracatheter Q8H

## 2022-06-20 NOTE — PLAN OF CARE
"Goal Outcome Evaluation: Progressing   Reason for hospital stay:  Generalized weakness   Living situation PTA: Lived at home by herself.   Most recent vitals: /54 (BP Location: Left arm, Patient Position: Semi-Gaston's, Cuff Size: Adult Regular)   Pulse 101   Temp 98.2  F (36.8  C) (Tympanic)   Resp 18   Ht 1.575 m (5' 2\")   Wt 57.8 kg (127 lb 6.8 oz)   SpO2 97%   BMI 23.31 kg/m      Pain Management:  Pt denies having pain.   LOC:  Alert and orientated to person, place, time, and situation.   Cardiac:  Tachycardic. MD is aware of this has she has been tachycardic since admission.   Respiratory:  All fields diminished.   GI:  All quadrants audible and normoactive.   :  Incontinence of stool and urine. Pt makes no effort to use the call light for the commode or bathroom. She is checked and changed every 2 hours.   Skin Issues: Skin is pale, warm, and dry. Scattered bruises. Redness blanchable coccyx/Sacrum/buttock/IT. Skin barrier is being applied liberally.   IVF:  None. IV access right hand. IV SL. She did report that her IV site was tender and sore. Upon inspection of the site there is no redness, swelling, or infiltration.   ABX:  PO Diflucan every 72 hours.     Nutrition: Poor. Soft and bite sized diet. Pt has only been drinking Ensures at meal time.   Ambulation: Assist x 1 with use of gait belt and walker.   Safety:  Bed in the low position, call light within reach, ID band in place, personal items within reach, use of call light appropriately, and makes needs known.     Comments: Vitally stable and afebrile. Pt is cooperative and pleasant. Face to face report given with opportunity to observe patient.    Report given to LON Singh.     Ester Cuevas RN   6/20/2022  7:14 AM                         "

## 2022-06-20 NOTE — PHARMACY-MEDICATION REGIMEN REVIEW
Pharmacy Antimicrobial Stewardship Assessment     Current Antimicrobial Therapy:  Anti-infectives (From now, onward)    Start     Dose/Rate Route Frequency Ordered Stop    22 1700  fluconazole (DIFLUCAN) tablet 100 mg         100 mg Oral EVERY 72 HOURS 22 1641            Indication: Topical Candidiasis     Days of Therapy: 8      Pertinent Labs:  Recent Labs   Lab Test 22  0551 06/15/22  0615 22  0545   WBC 6.3 6.3 7.3     Recent Labs   Lab Test 22  0535 22  1650 10/14/19  2240   LACT  --  1.5 3.0*   CRP 16.9* 16.4*  --    PCAL  --  0.26*  --         Temperature:  Temp (24hrs), Av.6  F (37  C), Min:98.2  F (36.8  C), Max:99  F (37.2  C)    Culture Results:   (22) Urine = Klebsiella + E.coli + Aerococcus pneumoniae (received treatment with IV and PO cephalosporins)     (22) COVID = positive    Recommendations/Interventions:  1. Fluconazole can further prolong QT interval; monitor closely.  2. Per notes it seems this is Cutaneous Candidiasis where dosing would be 100 mg daily per [place] Guide:       Rowena Elliott RPH  2022

## 2022-06-20 NOTE — PROGRESS NOTES
Transportation set up with [facility] to [facility] in [place] via [facility] for 10:00 am on Tuesday, 06/21.       PAS-RR    Per DHS regulation, CTS team completed and submitted PAS-RR to [place] [employer] via the [employer]. CTS team advised SNF and they are aware a PAS-RR has been submitted.     CTS team reviewed with pt or health care agent that they may be contacted for a follow up appointment within 10 days of hospital discharge if SNF stay is less than 30 days. Contact information for [employer] was also provided.     Pt or health care agent verbalized understanding.     PAS-RR # [id number]

## 2022-06-20 NOTE — PLAN OF CARE
"Most recent vitals: /68 (BP Location: Left arm)   Pulse 108   Temp 98.7  F (37.1  C) (Tympanic)   Resp 16   Ht 1.575 m (5' 2\")   Wt 57.8 kg (127 lb 6.8 oz)   SpO2 97%   BMI 23.31 kg/m      VSS, afebrile, denies pain. IV SL. LS clear, BS active. Up in chair for meals, poor appetite. Continues to drink ensures and coffee at mealtimes. Incontinent of urine and stool. Call light within reach, makes needs known.     Face to face report given with opportunity to observe patient.    Report given to Ella Pak RN   6/20/2022  3:01 PM        "

## 2022-06-21 VITALS
HEART RATE: 102 BPM | OXYGEN SATURATION: 96 % | SYSTOLIC BLOOD PRESSURE: 125 MMHG | DIASTOLIC BLOOD PRESSURE: 73 MMHG | WEIGHT: 127.43 LBS | HEIGHT: 62 IN | RESPIRATION RATE: 16 BRPM | TEMPERATURE: 97.9 F | BODY MASS INDEX: 23.45 KG/M2

## 2022-06-21 PROBLEM — F33.40 RECURRENT MAJOR DEPRESSIVE DISORDER, IN REMISSION (H): Status: ACTIVE | Noted: 2022-06-21

## 2022-06-21 PROBLEM — M43.16 SPONDYLOLISTHESIS AT L4-L5 LEVEL: Chronic | Status: ACTIVE | Noted: 2022-06-21

## 2022-06-21 PROCEDURE — 250N000013 HC RX MED GY IP 250 OP 250 PS 637: Performed by: NURSE PRACTITIONER

## 2022-06-21 PROCEDURE — 99239 HOSP IP/OBS DSCHRG MGMT >30: CPT | Performed by: INTERNAL MEDICINE

## 2022-06-21 PROCEDURE — 250N000013 HC RX MED GY IP 250 OP 250 PS 637: Performed by: INTERNAL MEDICINE

## 2022-06-21 RX ORDER — BUPROPION HYDROCHLORIDE 150 MG/1
150 TABLET ORAL DAILY
Qty: 30 TABLET | Refills: 0 | Status: SHIPPED | OUTPATIENT
Start: 2022-06-21 | End: 2022-07-27

## 2022-06-21 RX ORDER — AMLODIPINE BESYLATE 10 MG/1
5 TABLET ORAL DAILY
Qty: 90 TABLET | Refills: 3 | Status: CANCELLED | OUTPATIENT
Start: 2022-06-21

## 2022-06-21 RX ORDER — NICOTINE 21 MG/24HR
1 PATCH, TRANSDERMAL 24 HOURS TRANSDERMAL DAILY
Qty: 30 PATCH | Refills: 0 | Status: SHIPPED | OUTPATIENT
Start: 2022-06-21 | End: 2022-08-18

## 2022-06-21 RX ADMIN — POTASSIUM CHLORIDE 20 MEQ: 750 CAPSULE, EXTENDED RELEASE ORAL at 08:38

## 2022-06-21 RX ADMIN — BUPROPION HYDROCHLORIDE 150 MG: 150 TABLET, FILM COATED, EXTENDED RELEASE ORAL at 08:38

## 2022-06-21 ASSESSMENT — ACTIVITIES OF DAILY LIVING (ADL)
ADLS_ACUITY_SCORE: 39

## 2022-06-21 NOTE — PLAN OF CARE
"Goal Outcome Evaluation:        Blood pressure 115/64, pulse 101, temperature 98.3  F (36.8  C), temperature source Tympanic, resp. rate 16, height 1.575 m (5' 2\"), weight 57.8 kg (127 lb 6.8 oz), SpO2 97 %, not currently breastfeeding.    A&O, VSS, except HR tachy, afebrile, denies pain, repo Q2, incont of bowel and bladder this shift as charted, lungs dim throughout RA, isac cough at times, redness to coccyx/buttocks barrier applied, IV SL flushes well, free from falls, makes needs known, slept majority of shift.    Face to face report given with opportunity to observe patient.    Report given to LON Roger RN   6/21/2022  7:42 AM                "

## 2022-06-21 NOTE — PLAN OF CARE
Physical Therapy Discharge Summary    Reason for therapy discharge:    Discharged to transitional care facility.    Progress towards therapy goal(s). See goals on Care Plan in [facility] electronic health record for goal details.  Goals not met.  Barriers to achieving goals:   limited participation in PT services.    Therapy recommendation(s):    Continued therapy is recommended.  Rationale/Recommendations:  to increase safety and independence with functional mobility.    Goal Outcome Evaluation:

## 2022-06-21 NOTE — PLAN OF CARE
Free from falls/injuries this shift. Refuses to be up in chair. Inc of urine. Makes no attempts to use BSC. Barrier cream to butt with linh cares.     Face to face report given with opportunity to observe patient.    Report given to Alexandra Agarwal RN   6/20/2022  11:39 PM

## 2022-06-21 NOTE — DISCHARGE SUMMARY
[facility]  Hospitalist Discharge Summary      Date of Admission:  6/7/2022  Date of Discharge:  6/21/2022  Discharging Provider: Karthik Benton MD  Discharge Service: Hospitalist Service    Discharge Diagnoses   Principal Problem:    Generalized muscle weakness  Active Problems:    Tobacco abuse    Alcohol use    Prolonged Q-T interval on ECG    Failure to thrive in adult    COVID-19    Decubitus ulcer of buttock    Acute cystitis without hematuria    Protein-calorie malnutrition (H)    Chronic low back pain    Recurrent major depressive disorder, in remission (H)    Spondylolisthesis at L4-L5 level      Follow-ups Needed After Discharge   Follow-up Appointments     Follow Up and recommended labs and tests      Follow up with skilled nursing physician.  The following labs/tests are   recommended: CBC, BMP.  Follow up with primary care provider in 2 to 3 weeks weeks.  The following   labs/tests are recommended: CBC, BMP.             Discharge Disposition   Discharged to short-term care facility  Condition at discharge: Stable    Hospital Course   HPI from H&P:  Raquel Sanchez is a 72 year old female who has past medical history significant for tobacco and alcohol abuse, prolonged QT, fall with hip fracture who lives alone and gets some help from family, who was brought to emergency room because of the weakness.  Family is concerned that she is no longer able to care for herself at home.  She has become very weak and fatigued and she has been extremely sedentary and not been out of her chair in her home almost since she was back home after admission in late April.  Some neighbors brings her food cleans her when she soils herself.  Recently she was seen and evaluated by hospice and palliative care per primary care physician's request.  Patient herself is agreeable to go to nursing home.  History is important with the recent admission April 22 with ventricular tachycardia with low potassium and magnesium.  She  was treated with amiodarone.  Seen by cardiology.  Followed with a stress test which was unremarkable and showed good ejection fraction also.  2week cardiac monitor showed sinus tachycardia and sinus bradycardia.  Tachycardia 34% of the time ventricular ectopy with 6% PVCs.  No A. fib.  No patient triggered events.  Ejection fraction was 65% and nuclear stress test was normal.  May 12, 2022 she saw her primary for leg weakness and primary referred palliative care.  She is progressively getting weaker.  Primary recommended PT but she declined it for now.  Primary also did not encourage her to get more injections because they are not working.   Wheelchair prescribed and labs to monitor electrolytes were ordered. Palliative care saw the patient on the day of admission.  She was found sitting in recliner where she spends most of her time soiled with stool.  She recently was exposed to COVID from her daughter who [place] [facility] [place] exposed there.   Patient was seen in the emergency room and found in stable condition only abnormal vital sign was persistent tachycardia.  She did not require oxygen all ED stay.  Admission labs were significant for albumin 1.6, protein 6.5, procalcitonin 0.26 and potassium 4.1 and magnesium 1.7.  White blood cell count 11.3 and mild thrombocytosis, and UA showed white blood cell count 24.  CT showed no PE, no aortic dissection.  Fluid the debris's dependent portion of the left mainstem bronchus with thickening of the lingula bronchus and occlusion of the left lower lobe bronchus left hilar or endobronchial mass not excluded.  Bronchoscopy recommended.  Large left pleural effusion with compressive atelectasis left lower lobe.  Mild to moderate diffuse esophagitis.    Brief Hospital course: Ongoing issues were addressed during the hospitalization  1. Generalized muscle weakness:   Initially there was concern for new neurological cause of weakness as it  was worse in lower extremities, particularly the left lower extremity. However, MRI was done on entire spine that showed no acute changes. She does have degenerative anterolisthesis of L4 relative to L5 with severe bilateral facet joint degenerative change. Anterolisthesis results in uncovering of the posterior margins of the disc, mildly narrowing the central canal and moderately narrowing the lateral recesses with slight mass effect upon the intrathecal andexiting L5 nerve roots, worse on the left.  Patient received PT and OT and gained the impressive of functional improvement.  By the time of discharge, patient ambulated 50 feet with minimal assistance of 1.  She is requiring frequent resting but is improving in her functional status.  Patient was discharged to short-term rehab for continuing rehabilitative effort.      Acute cystitis without hematuria:   Patient's urine culture eventually grew aeurococcus, klebsiella, e. Coli.  All the organisms were sensitive to ceftriaxone started on arrival, which was transitioned to Omnicef.  The course of antibiotic's were completed on Zee 15.      COVID-19 infection:   Patient's SARS-CoV-2 test was positive on arrival. She did present with some hypoxia but largely remained asymptomatic.  No particular treatment was administered during this hospitalization and patient remained stable in terms of her respiratory status.      Acute respiratory failure with hypoxia:   Patient was noted with a mild hypoxia on admission and required 1 liter of oxygen on arrival, then increased to 2-3. However she was successfully weaned off from oxygen and remained stable on room air for the rest of the hospitalization.  The etiology of hypoxia was thought to be prolonged period of inactivity causing atelectasis vs due to bilateral effusions. She did not have fevers, no leukocytosis so bacterial pneumonia unlikely. CT chest did show effusions and LLL atelectasis vs pneumonia.       Severe  malnutrition (H):  Patient was noted with recent weight loss, very poor oral intake since arrival.  Nutrition consult saw the patient and patient was encouraged to increase oral intake, and supplements prescribed.   Patient's diet was advanced and she increased her p.o. intake.  Patient's nutritional status improved significantly during this hospitalization reflected by improved albumin and prealbumin level.      Depression:   Patient appeared to be depressed with a flat affect and making statements about difficult living at her age as well as anxiety.  Patient was started on Wellbutrin and responded well and by the time of discharge patient's mood has improved greatly.  Wellbutrin was continued at the time of discharge      Tobacco abuse:  Patient was advised not to consume tobacco in light of her ongoing illness.  She responded well to nicotine replacement therapy, which is continued at the time of discharge      Alcohol use:   Patient did not develop withdrawal symptoms during the hospitalization      Consultations This Hospital Stay   CARE MANAGEMENT / SOCIAL WORK IP CONSULT  NUTRITION SERVICES ADULT IP CONSULT  RESPIRATORY CARE IP CONSULT  SPEECH LANGUAGE PATH ADULT IP CONSULT  SMOKING CESSATION PROGRAM IP CONSULT  PHYSICAL THERAPY ADULT IP CONSULT  SURGERY GENERAL IP CONSULT  NEUROLOGY IP CONSULT  SPEECH LANGUAGE PATH ADULT IP CONSULT  PHYSICAL THERAPY ADULT IP CONSULT  OCCUPATIONAL THERAPY ADULT IP CONSULT    Code Status   Full Code    Time Spent on this Encounter   I, Karthik Benton MD, personally saw the patient today and spent greater than 30 minutes discharging this patient.       Karthik Benton MD  [facility]  [address] [place] [id number]  Phone: [phone]  Fax: [phone]  ______________________________________________________________________    Physical Exam   Vital Signs: Temp: 97.9  F (36.6  C) Temp src: Tympanic BP: 125/73 Pulse: 102   Resp: 16 SpO2: 96 % O2 Device: None (Room  air)    Weight: 127 lbs 6.81 oz         Primary Care Physician   Pelon David    Discharge Orders      General info for SNF    Length of Stay Estimate: Short Term Care: Estimated # of Days <30  Condition at Discharge: Improving  Level of care:skilled   Rehabilitation Potential: Good  Admission H&P remains valid and up-to-date: Yes  Recent Chemotherapy: N/A  Use Nursing Home Standing Orders: Yes     Mantoux instructions    Give two-step Mantoux (PPD) Per Facility Policy Yes     Follow Up and recommended labs and tests    Follow up with [facility] physician.  The following labs/tests are recommended: CBC, BMP.  Follow up with primary care provider in 2 to 3 weeks weeks.  The following labs/tests are recommended: CBC, BMP.     Reason for your hospital stay    COVID-19, UTI, lower extremity weakness, inability to walk, pressure ulcer stage I, fungal dermatitis, lumbar spine degenerative joint disease, Nonspondylolytic spondylolisthesis of L4 on L5     Intake and output    Every shift     Daily weights    Call Provider for weight gain of more than 2 pounds per day or 5 pounds per week.     Wound care    Site:   Buttock/perineal area  Instructions: Apply antifungal powder and barrier cream;Treat buttock with barrier ointment and frequent repositioning of the area for pressure relief     Activity - Up with nursing assistance     Full Code     Physical Therapy Adult Consult    Evaluate and treat as clinically indicated.    Reason: Debility, chronic low back pain, muscle atrophy     Occupational Therapy Adult Consult    Evaluate and treat as clinically indicated.    Reason: Debility, muscle atrophy, chronic low back pain     Fall precautions     Diet    Follow this diet upon discharge: Orders Placed This Encounter      Soft & Bite Sized Diet (level 6) Thin Liquids (level 0)       Significant Results and Procedures   Most Recent 3 CBC's:Recent Labs   Lab Test 06/20/22  0551 06/19/22  0539 06/16/22  0549 06/15/22  0615  06/14/22  0545   WBC 6.3  --   --  6.3 7.3   HGB 9.6*  --   --  8.6* 8.2*   MCV 98  --   --  95 96    388 369 440 376     Most Recent 3 BMP's:Recent Labs   Lab Test 06/20/22  0551 06/19/22  0539 06/16/22  0549 06/15/22  0615 06/14/22  0545     --   --  139 138   POTASSIUM 3.8 3.8  --  3.8 3.9   CHLORIDE 106  --   --  108 108   CO2 25  --   --  26 26   BUN 6*  --   --  6* 6*   CR 0.34* 0.34* 0.33* 0.32* 0.29*   ANIONGAP 4  --   --  5 4   ROSA 8.6  --   --  8.2* 8.0*   GLC 93  --   --  88 96     Most Recent 2 LFT's:Recent Labs   Lab Test 06/20/22  0551 06/08/22  0535   AST 17 40   ALT 15 18   ALKPHOS 161* 318*   BILITOTAL 0.3 0.5   ,   Results for orders placed or performed during the hospital encounter of 06/07/22   CTA Chest with Contrast    Narrative    CTA CHEST WITH CONTRAST  6/7/2022 7:46 PM    CLINICAL HISTORY: Female, age 72 years,  PE suspected,  low/intermediate prob, positive D-dimer;    Comparison:  None    TECHNIQUE:  CT was performed of the chest  with IV contrast.   Sagittal, coronal, axial and MIP reconstructions were reviewed.     FINDINGS:  Chest CT:    CT angiogram of the chest: Excellent opacification of the arterial  structures demonstrate no evidence of pulmonary embolus or aortic  dissection. Dense coronary calcifications are seen with small  pericardial effusion.    Moderate size left and small right-sided pleural effusions are present  with near complete atelectasis of the left lower lobe. Mild/moderate  centrilobular emphysema seen throughout both lungs with a predominance  in the upper lobes. There is atelectasis are seen in the dependent  portions of both lungs.    Number of normal-sized nodes are seen throughout the mediastinum and  hilar regions. There appears to mild thickening of the distal  esophagus.      Visualized portions of the upper abdomen demonstrate a small gas  bubble within the liver parenchyma suggesting previous sphincterotomy.  Dense calcifications are seen  within the arterial structures in this  fusiform dilatation/aneurysm in the more distal portions of the  visualized abdominal aorta.        Impression    IMPRESSION:   Good quality CTA demonstrates no evidence of acute vascular  abnormality. No evidence of pulmonary embolus or aortic dissection.    Moderate size left and small right-sided pleural effusion with near  complete atelectasis/consolidation of the left lower lobe.    Esophagitis.    Small pericardial effusion.    Small volume of fluid within the left mainstem bronchus and possible  mucous plugging.    Small volume of gas within the liver parenchyma suggesting previous  sphincterotomy.    Portions of the abdominal aorta appears somewhat ectatic. Cannot  exclude aneurysm.    This report is in agreement with the preliminary report.    ALEXIS KUMAR MD         SYSTEM ID:  [id number]   XR Chest Port 1 View    Narrative    Procedure:XR CHEST PORT 1 VIEW    Clinical history:Female, 72 years, left lower lobe pneumonia and  effusion seen on recent CT scan.    Technique: Single view was obtained.    Comparison: CT scan of the chest 6/7/2022; chest x-ray 10/14/2019    Findings: The cardiac silhouette is normal. The pulmonary vasculature  is normal.    The lungs demonstrate dense consolidation in the left lower lobe with  small/moderate left effusion. Bony structures are unremarkable.      Impression    Impression:   Small/moderate left-sided pleural effusion with dense  consolidation/pneumonia of the left lower lobe.    Small right-sided effusion seen on recent CT is not apparent on this  chest x-ray.    ALEXIS KUMAR MD         SYSTEM ID:  [id number]   MR Brain w/o & w Contrast    Narrative    MR BRAIN W/O & W CONTRAST  6/8/2022 8:51 PM    History: Female, age 72 years, Neuro deficit, acute, stroke suspected    Comparison: None.    Technique: Sagittal T1, axial T2, T2 FLAIR, diffusion, gradient echo,  ADC mapping, T1 and 3 plane T1 fat saturation postcontrast 3-D.  .    Findings:   Ventricles and sulci: Normal in size and shape.    Gray and white matter: Scattered, nonenhancing white matter lesions  suggesting small vessel disease.    Cerebellopontine angles: Clear    Extra-axial spaces: Clear.    Enhancement: Normal.    Midline structures: Normal in contour.  Globes: Normal.  Orbits: Normal. Intraconal and extraconal fat unremarkable.  Extraocular muscles: Normal.  Paranasal sinuses: Normal.  Mastoid air cells: Normal.  Diffusion: Normal.      Impression    Impression:  Scattered, nonspecific white matter changes suggest small vessel  disease. There is no evidence of acute or subacute ischemia.      ALEXIS KUMAR MD         SYSTEM ID:  [id number]   MR Cervical Spine w/o Contrast    Narrative    MR CERVICAL SPINE W/O CONTRAST    HISTORY: Lower extremity muscle weakness. Myelopathy.    TECHNIQUE: Sagittal T1, T2 and STIR as well as axial T2 gradient and  3-D T2 images of the cervical spine were obtained.    COMPARISON: MR brain 6/8/2022    FINDINGS:      The cervical lordosis is preserved. The vertebral body heights are  maintained. No marrow edema is present. No suspicious marrow lesion is  identified. The craniocervical junction is intact.    The cervical cord has a normal caliber. There are no areas of abnormal  cord signal. T2 hyperintense signal is redemonstrated within the tobias  No masses or fluid collections are seen in the spinal canal or  paravertebral soft tissues.      Degenerative changes are mostly mild, with disc bulging as well as  uncovertebral and facet degenerative hypertrophy resulting in mild  left foraminal stenoses at C5-6 and C6-7.      Impression    IMPRESSION:    Allowing for mild motion artifact, no evidence of abnormal cervical  cord signal. No critical spinal stenosis or cord compression.    Pontine T2 hyperintensity is redemonstrated, also seen on the MR brain  and most likely reflecting advanced microvascular disease.    MARK RANDHAWA MD          SYSTEM ID:  [id number]   MR Thoracic Spine w/o Contrast    Narrative    PROCEDURE: MR THORACIC SPINE W/O CONTRAST, 6/8/2022 8:33 PM     HISTORY:Female, age 72 years, Mid-back pain    COMPARISON: CT scan of the chest 6/7/2022    TECHNIQUE:    FINDINGS:  Thoracic spine demonstrates normal kyphotic curvature. Thoracic spinal  cord is normal.    Thoracic vertebral bodies are intact. There are mild degenerative  changes seen throughout the thoracic spine without evidence of central  canal or foraminal narrowing.    Visualized portions of the rib cage demonstrate no evidence of an  apparent fracture.   Moderate size left and small right-sided effusion is similar in  appearance compared to the recent CT scan with persistent atelectasis  and consolidation of the left lower lobe.    Paraspinous muscles are mildly atrophic.      Impression    IMPRESSION:   No evidence of acute abnormality. Mild degenerative changes of the  thoracic spine.    Stable appearance of bilateral pleural effusions and  atelectasis/consolidation of the left lower lobe.     ALEXIS KUMAR MD         SYSTEM ID:  [id number]   MR Lumbar Spine w/o Contrast    Narrative    PROCEDURE: MR LUMBAR SPINE W/O CONTRAST, 6/8/2022 8:34 PM     HISTORY:Female, age 72 years, Low back pain, > 6 wks    COMPARISON: MR lumbar spine 12/29/2021    TECHNIQUE: Sagittal T1, proton density, T2 with fat saturation; axial  proton density and T2.    FINDINGS:  L4 is anteriorly subluxed approximately 4 mm in relation to L5,  similar when compared to prior study.    Mild disc and endplate degenerative changes are again seen at T11-T12.  The T12-L1 disc is normal. Facet joints and endplates at T12-L1 are  also normal.    L1-2: Mild disc degeneration with slight bulge, mild endplate and  facet joint degenerative changes are similar in appearance.    L2-3: Mild disc desiccation and slight bulge, mild endplate and facet  joint degenerative changes are similar in appearance.    L3-4:  Mild disc degeneration, mild bulge, endplate and facet joint  degenerative changes are similar in appearance with persistent right  L3 ganglionic encroachment/abutment.    L4-5: Mild disc height loss. Degenerative anterolisthesis of L4  relative to L5 with severe bilateral facet joint degenerative change.  Anterolisthesis results in uncovering of the posterior margins of the  disc, mildly narrowing the central canal and moderately narrowing the  lateral recesses with slight mass effect upon the intrathecal and  exiting L5 nerve roots, worse on the left. Neuroforamina layering is  similar in appearance with bilateral ganglionic abutment, worse on the  left.    L5-S1: Mild disc degeneration. Mild endplate and mild/moderate  bilateral facet joint degenerative changes are similar.    SI joints are congruent. No evidence of sacral fracture.    There is been progressive atrophy of the iliac is muscles. Moderate  atrophy of the paraspinous muscles and moderate to severe atrophy of  the psoas muscles does not appear to be significantly different.  Moderate atrophy the gluteus tyson and minimus muscles also  unchanged.      Impression    IMPRESSION:   No acute abnormality.    Multilevel degenerative change is similar in appearance dating back to  2021 with foraminal narrowing and lateral recess as described  above.    ALEXIS KUMAR MD         SYSTEM ID:  [id number]   Echocardiogram Complete     Value    LVEF  55-60%    Narrative    [id number]  [id number]  [id number]  [id number]^FIONA^CAMILA     [facility]  Echocardiography Laboratory  [address] [place] [address]     Name: KARAN ESPINOZA  MRN: [id number]  : 1950  Study Date: 2022 11:29 AM  Age: 72 yrs  Gender: Female  Patient Location: [facility]  Reason For Study: Cardiomyopathy - Alcohol  History: Cardiomyopathy  Ordering Physician: CAMILA JAIMES  Performed By: Kate Lozano RDCS     BSA: 1.6 m2  Height: 62 in  Weight:  128 lb  ______________________________________________________________________________  Procedure  Complete Portable Echo Adult.  ______________________________________________________________________________  Interpretation Summary  Global and regional left ventricular function is normal with an EF of 55-60%.  The right ventricle is normal size.  Global right ventricular function is normal.  Both atria appear normal.  Trace mitral insufficiency is present.  Aortic valve sclerosis is present.  Trace tricuspid insufficiency is present.  ______________________________________________________________________________  Left Ventricle  Global and regional left ventricular function is normal with an EF of 55-60%.  Grade I or early diastolic dysfunction.     Right Ventricle  The right ventricle is normal size. Global right ventricular function is  normal.     Atria  Both atria appear normal.     Mitral Valve  Trace mitral insufficiency is present.     Aortic Valve  Aortic valve sclerosis is present. The mean AoV pressure gradient is 2.5 mmHg.  The peak AoV pressure gradient is 4.6 mmHg.     Tricuspid Valve  Trace tricuspid insufficiency is present.     Pericardium  No pericardial effusion is present.  ______________________________________________________________________________  MMode/2D Measurements & Calculations  IVSd: 1.0 cm  LVIDd: 3.8 cm  LVIDs: 2.6 cm  LVPWd: 1.0 cm  FS: 29.6 %  LV mass(C)d: 117.4 grams  LV mass(C)dI: 74.2 grams/m2  Ao root diam: 3.2 cm  asc Aorta Diam: 3.1 cm  LVOT diam: 2.1 cm  LVOT area: 3.4 cm2  RWT: 0.54     Doppler Measurements & Calculations  MV E max kirit: 75.0 cm/sec  MV A max kirit: 100.0 cm/sec  MV E/A: 0.75     MV dec slope: 472.0 cm/sec2  MV dec time: 0.16 sec  Ao V2 max: 107.0 cm/sec  Ao max P.6 mmHg  Ao V2 mean: 74.0 cm/sec  Ao mean P.5 mmHg  Ao V2 VTI: 16.9 cm  KEVIN(I,D): 3.2 cm2  KEVIN(V,D): 2.9 cm2  LV V1 max PG: 3.4 mmHg  LV V1 max: 92.0 cm/sec  LV V1 VTI: 15.6 cm  SV(LVOT):  53.6 ml  SI(LVOT): 33.9 ml/m2  PA V2 max: 112.0 cm/sec  PA max P.0 mmHg  PA mean P.5 mmHg  PA V2 VTI: 16.2 cm  AV Kun Ratio (DI): 0.86  KEVIN Index (cm2/m2): 2.0  E/E' av.1  Lateral E/e': 8.9  Medial E/e': 9.4     ______________________________________________________________________________  Report approved by: Florentin Alcazar 2022 08:30 AM               Discharge Medications   Current Discharge Medication List      START taking these medications    Details   buPROPion (WELLBUTRIN XL) 150 MG 24 hr tablet Take 1 tablet (150 mg) by mouth daily  Qty: 30 tablet, Refills: 0    Associated Diagnoses: Dysthymia      miconazole (MICATIN) 2 % external powder Apply topically 2 times daily  Qty: 90 g, Refills: 0    Associated Diagnoses: Perineal rash, female      nicotine (NICODERM CQ) 21 MG/24HR 24 hr patch Place 1 patch onto the skin daily  Qty: 30 patch, Refills: 0    Associated Diagnoses: Tobacco abuse         CONTINUE these medications which have NOT CHANGED    Details   acetaminophen (TYLENOL) 325 MG tablet Take 325 mg by mouth every 6 hours as needed for pain or fever      atorvastatin (LIPITOR) 40 MG tablet TAKE 1 TABLET DAILY BY MOUTH  Qty: 90 tablet, Refills: 1    Comments: Recheck labs  Associated Diagnoses: Hyperlipidemia, unspecified hyperlipidemia type      clonazePAM (KLONOPIN) 0.5 MG tablet TAKE 1 TABLET (0.5 MG) BY MOUTH 2 TIMES DAILY AS NEEDED FOR ANXIETY  Qty: 60 tablet, Refills: 0    Associated Diagnoses: Anxiety      hydrOXYzine (VISTARIL) 25 MG capsule TAKE 1 CAPSULE (25 MG) BY MOUTH 3 TIMES DAILY AS NEEDED FOR ITCHING  Qty: 120 capsule, Refills: 3    Associated Diagnoses: Rash      Multiple Vitamins-Minerals (MULTIVITAMIN ADULTS 50+) TABS Take 1 tablet by mouth daily      potassium chloride ER (K-TAB) 20 MEQ CR tablet Take 1 tablet (20 mEq) by mouth 2 times daily  Qty: 180 tablet, Refills: 3    Associated Diagnoses: Hypokalemia      triamcinolone (ARISTOCORT HP) 0.5 % external cream  APPLY SPARINGLY TO AFFECTED AREA THREE TIMES DAILY.  Qty: 30 g, Refills: 1    Associated Diagnoses: Dermatitis         STOP taking these medications       amLODIPine (NORVASC) 10 MG tablet Comments:   Reason for Stopping:             Allergies   Allergies   Allergen Reactions     Tape [Adhesive Tape] Blisters     Seasonal Allergies      Poison Ivy Extract [Extract Of Poison Ivy] Rash

## 2022-06-21 NOTE — PROGRESS NOTES
Name: Raquel Sanchez    MRN#: [id number]    Reason for Hospitalization: Weakness [R53.1]  Esophagitis [K20.90]  Pleural effusion [J90]  Generalized muscle weakness [M62.81]  Failure to thrive in adult [R62.7]  Decubitus ulcer of buttock [L89.309]  COVID-19 [U07.1]    CHELSY: Low    Discharge Date: 6/21/2022    Patient / Family response to discharge plan: Pt agreeable to discharge to [facility]    Follow-Up Appt: No future appointments.    Other Providers (Care Coordinator, County Services, PCA services etc): No    Discharge Disposition: nursing home    Zuly Oviedo CM

## 2022-07-26 NOTE — PROGRESS NOTES
"  Assessment & Plan     Anxiety  Severe, with severe depressive sx.  See discussion below.  I started celexa and seeing her back to ensure no issues.    - citalopram (CELEXA) 20 MG tablet; Take 1 tablet (20 mg) by mouth daily  - clonazePAM (KLONOPIN) 0.5 MG tablet; Take 1 tablet (0.5 mg) by mouth 2 times daily as needed for anxiety  - Home Care Referral    Moderate protein-calorie malnutrition (H)  She is getting stronger.  She is eating and able to do so at home.  She is getting top denture very soon which will help.  She is very motivated.    - Basic metabolic panel; Future  - CBC with Platelets & Differential; Future  - Home Care Referral    Acute cystitis without hematuria  Resolved.      Failure to thrive in adult  Improving.  She got a lot of strength back in the NH.  We set up home cares today for therapy and nursing to help monitor, etc.    - Home Care Referral    Chronic bilateral low back pain without sciatica  Worsened at the \"tailbone\".  Tender there.  Update xray showing no acute change.  We started the meloxicam today, cautiously.    - XR LUMBAR SPINE 2/3 VIEWS (Clinic Performed); Future  - Home Care Referral  - meloxicam (MOBIC) 7.5 MG tablet; Take 1 tablet (7.5 mg) by mouth daily as needed    Discussion:  Raquel had quite a health scare.  She couldn't walk when she went to the hospital and was failing to thrive entirely.  Family very involved.  Raquel is motivated.  She was started on wellbutrin, but has been crying all the time and it's tough for Jess to see.  The wellbutrin didn't help.  She has been off it for 3 days now.  We did not restart.  She was better on celexa.  She went in with low K following diarrhea episode, and it was stopped and never restarted.  I restarted today.  Seeing her back in 3 weeks.  Will plan bmp at that time, though I think the low K was from the diarrhea.  We will follow closely.  Hopefully, we will get some ongoing improvements here.    45 minutes spent on the date of " "the encounter doing chart review, review of test results, patient visit and documentation            No follow-ups on file.    Pelon David MD  [facility]    Juliano García is a 72 year old, presenting for the following health issues:  Hospital F/U      [facility]       Hospital Follow-up Visit:    Hospital/Nursing Home/IP Rehab Facility: [facility]  Date of Admission: 6/7/22  Date of Discharge: 6/21/22  Reason(s) for Admission: generalized weakness    Was your hospitalization related to COVID-19? No   Problems taking medications regularly:  None  Medication changes since discharge: None  Problems adhering to non-medication therapy:  None    Summary of hospitalization:  [facility] discharge summary reviewed  Diagnostic Tests/Treatments reviewed.  Follow up needed: none  Other Healthcare Providers Involved in Patient s Care:         Homecare  Update since discharge: improved. Post Medication Reconciliation Status:        Plan of care communicated with patient                 Review of Systems   Constitutional, HEENT, cardiovascular, pulmonary, gi and gu systems are negative, except as otherwise noted.      Objective    /80 (BP Location: Right arm, Patient Position: Chair)   Pulse (!) 133   Temp 97.7  F (36.5  C) (Tympanic)   Ht 1.575 m (5' 2\")   Wt 54 kg (119 lb)   SpO2 98%   BMI 21.77 kg/m    Body mass index is 21.77 kg/m .  Physical Exam   GENERAL: healthy, alert and no distress  NECK: no adenopathy, no asymmetry, masses, or scars and thyroid normal to palpation  RESP: lungs clear to auscultation - no rales, rhonchi or wheezes  CV: tachy regular rate and rhythm, normal S1 S2, no S3 or S4, no murmur, click or rub, no peripheral edema and peripheral pulses strong  ABDOMEN: soft, nontender, no hepatosplenomegaly, no masses and bowel sounds normal  MS: no gross musculoskeletal defects noted, no edema  PSYCH: mentation appears normal, affect " normal/bright and she suddenly breaks out in tears while we're talking.  It improved quickly but was quite prominent.      Lumbar xray shows:      Cbc stable bmp pending.                  .  ..

## 2022-07-27 ENCOUNTER — OFFICE VISIT (OUTPATIENT)
Dept: FAMILY MEDICINE | Facility: OTHER | Age: 72
End: 2022-07-27
Attending: FAMILY MEDICINE
Payer: MEDICARE

## 2022-07-27 ENCOUNTER — ANCILLARY PROCEDURE (OUTPATIENT)
Dept: GENERAL RADIOLOGY | Facility: OTHER | Age: 72
End: 2022-07-27
Attending: FAMILY MEDICINE
Payer: MEDICARE

## 2022-07-27 VITALS
TEMPERATURE: 97.7 F | WEIGHT: 119 LBS | HEART RATE: 133 BPM | DIASTOLIC BLOOD PRESSURE: 80 MMHG | BODY MASS INDEX: 21.9 KG/M2 | OXYGEN SATURATION: 98 % | SYSTOLIC BLOOD PRESSURE: 123 MMHG | HEIGHT: 62 IN

## 2022-07-27 DIAGNOSIS — N30.00 ACUTE CYSTITIS WITHOUT HEMATURIA: ICD-10-CM

## 2022-07-27 DIAGNOSIS — R62.7 FAILURE TO THRIVE IN ADULT: ICD-10-CM

## 2022-07-27 DIAGNOSIS — G89.29 CHRONIC BILATERAL LOW BACK PAIN WITHOUT SCIATICA: ICD-10-CM

## 2022-07-27 DIAGNOSIS — F41.9 ANXIETY: ICD-10-CM

## 2022-07-27 DIAGNOSIS — M54.50 CHRONIC BILATERAL LOW BACK PAIN WITHOUT SCIATICA: ICD-10-CM

## 2022-07-27 DIAGNOSIS — E44.0 MODERATE PROTEIN-CALORIE MALNUTRITION (H): Primary | ICD-10-CM

## 2022-07-27 LAB
BASOPHILS # BLD AUTO: 0 10E3/UL (ref 0–0.2)
BASOPHILS NFR BLD AUTO: 0 %
EOSINOPHIL # BLD AUTO: 0.2 10E3/UL (ref 0–0.7)
EOSINOPHIL NFR BLD AUTO: 3 %
ERYTHROCYTE [DISTWIDTH] IN BLOOD BY AUTOMATED COUNT: 14.9 % (ref 10–15)
HCT VFR BLD AUTO: 40.6 % (ref 35–47)
HGB BLD-MCNC: 13.5 G/DL (ref 11.7–15.7)
LYMPHOCYTES # BLD AUTO: 2.6 10E3/UL (ref 0.8–5.3)
LYMPHOCYTES NFR BLD AUTO: 38 %
MCH RBC QN AUTO: 32 PG (ref 26.5–33)
MCHC RBC AUTO-ENTMCNC: 33.3 G/DL (ref 31.5–36.5)
MCV RBC AUTO: 96 FL (ref 78–100)
MONOCYTES # BLD AUTO: 0.5 10E3/UL (ref 0–1.3)
MONOCYTES NFR BLD AUTO: 7 %
NEUTROPHILS # BLD AUTO: 3.5 10E3/UL (ref 1.6–8.3)
NEUTROPHILS NFR BLD AUTO: 52 %
PLATELET # BLD AUTO: 500 10E3/UL (ref 150–450)
RBC # BLD AUTO: 4.22 10E6/UL (ref 3.8–5.2)
WBC # BLD AUTO: 6.8 10E3/UL (ref 4–11)

## 2022-07-27 PROCEDURE — 72100 X-RAY EXAM L-S SPINE 2/3 VWS: CPT | Mod: TC,FY

## 2022-07-27 PROCEDURE — 85025 COMPLETE CBC W/AUTO DIFF WBC: CPT | Mod: ZL | Performed by: FAMILY MEDICINE

## 2022-07-27 PROCEDURE — 82310 ASSAY OF CALCIUM: CPT | Mod: ZL | Performed by: FAMILY MEDICINE

## 2022-07-27 PROCEDURE — 99215 OFFICE O/P EST HI 40 MIN: CPT | Performed by: FAMILY MEDICINE

## 2022-07-27 PROCEDURE — G0463 HOSPITAL OUTPT CLINIC VISIT: HCPCS

## 2022-07-27 PROCEDURE — 36415 COLL VENOUS BLD VENIPUNCTURE: CPT | Mod: ZL | Performed by: FAMILY MEDICINE

## 2022-07-27 RX ORDER — CLONAZEPAM 0.5 MG/1
0.5 TABLET ORAL 2 TIMES DAILY PRN
Qty: 60 TABLET | Refills: 5 | Status: SHIPPED | OUTPATIENT
Start: 2022-07-27 | End: 2023-01-24

## 2022-07-27 RX ORDER — MELOXICAM 7.5 MG/1
7.5 TABLET ORAL DAILY PRN
Qty: 30 TABLET | Refills: 3 | Status: SHIPPED | OUTPATIENT
Start: 2022-07-27 | End: 2022-08-18 | Stop reason: ALTCHOICE

## 2022-07-27 RX ORDER — CITALOPRAM HYDROBROMIDE 20 MG/1
20 TABLET ORAL DAILY
Qty: 90 TABLET | Refills: 3 | Status: SHIPPED | OUTPATIENT
Start: 2022-07-27 | End: 2023-08-15 | Stop reason: DRUGHIGH

## 2022-07-27 ASSESSMENT — ANXIETY QUESTIONNAIRES
2. NOT BEING ABLE TO STOP OR CONTROL WORRYING: SEVERAL DAYS
7. FEELING AFRAID AS IF SOMETHING AWFUL MIGHT HAPPEN: SEVERAL DAYS
GAD7 TOTAL SCORE: 7
1. FEELING NERVOUS, ANXIOUS, OR ON EDGE: SEVERAL DAYS
5. BEING SO RESTLESS THAT IT IS HARD TO SIT STILL: NOT AT ALL
4. TROUBLE RELAXING: NOT AT ALL
3. WORRYING TOO MUCH ABOUT DIFFERENT THINGS: SEVERAL DAYS
GAD7 TOTAL SCORE: 7
6. BECOMING EASILY ANNOYED OR IRRITABLE: NEARLY EVERY DAY
IF YOU CHECKED OFF ANY PROBLEMS ON THIS QUESTIONNAIRE, HOW DIFFICULT HAVE THESE PROBLEMS MADE IT FOR YOU TO DO YOUR WORK, TAKE CARE OF THINGS AT HOME, OR GET ALONG WITH OTHER PEOPLE: SOMEWHAT DIFFICULT

## 2022-07-27 ASSESSMENT — PAIN SCALES - GENERAL: PAINLEVEL: NO PAIN (0)

## 2022-07-27 ASSESSMENT — PATIENT HEALTH QUESTIONNAIRE - PHQ9: SUM OF ALL RESPONSES TO PHQ QUESTIONS 1-9: 9

## 2022-07-27 NOTE — NURSING NOTE
"Chief Complaint   Patient presents with     Hospital F/U       Initial /80 (BP Location: Right arm, Patient Position: Chair)   Pulse (!) 133   Temp 97.7  F (36.5  C) (Tympanic)   Ht 1.575 m (5' 2\")   Wt 54 kg (119 lb)   SpO2 98%   BMI 21.77 kg/m   Estimated body mass index is 21.77 kg/m  as calculated from the following:    Height as of this encounter: 1.575 m (5' 2\").    Weight as of this encounter: 54 kg (119 lb).  Medication Reconciliation: complete  Radha Ramírez LPN    "

## 2022-07-28 LAB
ANION GAP SERPL CALCULATED.3IONS-SCNC: 10 MMOL/L (ref 3–14)
BUN SERPL-MCNC: 4 MG/DL (ref 7–30)
CALCIUM SERPL-MCNC: 9 MG/DL (ref 8.5–10.1)
CHLORIDE BLD-SCNC: 105 MMOL/L (ref 94–109)
CO2 SERPL-SCNC: 22 MMOL/L (ref 20–32)
CREAT SERPL-MCNC: 0.5 MG/DL (ref 0.52–1.04)
GFR SERPL CREATININE-BSD FRML MDRD: >90 ML/MIN/1.73M2
GLUCOSE BLD-MCNC: 102 MG/DL (ref 70–99)
POTASSIUM BLD-SCNC: 4.3 MMOL/L (ref 3.4–5.3)
SODIUM SERPL-SCNC: 137 MMOL/L (ref 133–144)

## 2022-07-29 ENCOUNTER — TELEPHONE (OUTPATIENT)
Dept: FAMILY MEDICINE | Facility: OTHER | Age: 72
End: 2022-07-29

## 2022-07-29 DIAGNOSIS — G89.29 CHRONIC BILATERAL LOW BACK PAIN WITHOUT SCIATICA: Primary | ICD-10-CM

## 2022-07-29 DIAGNOSIS — M54.50 CHRONIC BILATERAL LOW BACK PAIN WITHOUT SCIATICA: Primary | ICD-10-CM

## 2022-07-29 NOTE — TELEPHONE ENCOUNTER
Received a call from Yuly in  Home Care that pt does not qualify for homecare as she is not home bound.  She would like to do outpatient PT, referral pended if you agree.

## 2022-08-18 ENCOUNTER — OFFICE VISIT (OUTPATIENT)
Dept: FAMILY MEDICINE | Facility: OTHER | Age: 72
End: 2022-08-18
Attending: FAMILY MEDICINE
Payer: MEDICARE

## 2022-08-18 ENCOUNTER — ANCILLARY PROCEDURE (OUTPATIENT)
Dept: GENERAL RADIOLOGY | Facility: OTHER | Age: 72
End: 2022-08-18
Attending: FAMILY MEDICINE
Payer: MEDICARE

## 2022-08-18 VITALS
WEIGHT: 119 LBS | SYSTOLIC BLOOD PRESSURE: 110 MMHG | BODY MASS INDEX: 21.9 KG/M2 | HEIGHT: 62 IN | TEMPERATURE: 97.3 F | DIASTOLIC BLOOD PRESSURE: 70 MMHG | OXYGEN SATURATION: 96 % | HEART RATE: 116 BPM

## 2022-08-18 DIAGNOSIS — M54.50 CHRONIC MIDLINE LOW BACK PAIN WITHOUT SCIATICA: ICD-10-CM

## 2022-08-18 DIAGNOSIS — F34.1 DYSTHYMIA: ICD-10-CM

## 2022-08-18 DIAGNOSIS — M79.672 LEFT FOOT PAIN: ICD-10-CM

## 2022-08-18 DIAGNOSIS — R29.898 LEG WEAKNESS, BILATERAL: ICD-10-CM

## 2022-08-18 DIAGNOSIS — M79.672 LEFT FOOT PAIN: Primary | ICD-10-CM

## 2022-08-18 DIAGNOSIS — G89.29 CHRONIC MIDLINE LOW BACK PAIN WITHOUT SCIATICA: ICD-10-CM

## 2022-08-18 PROCEDURE — 99214 OFFICE O/P EST MOD 30 MIN: CPT | Performed by: FAMILY MEDICINE

## 2022-08-18 PROCEDURE — G0463 HOSPITAL OUTPT CLINIC VISIT: HCPCS | Mod: 25

## 2022-08-18 PROCEDURE — 73630 X-RAY EXAM OF FOOT: CPT | Mod: TC,LT,FY

## 2022-08-18 RX ORDER — AMLODIPINE BESYLATE 10 MG/1
TABLET ORAL
COMMUNITY
Start: 2022-08-01 | End: 2022-08-18

## 2022-08-18 RX ORDER — NABUMETONE 500 MG/1
500 TABLET, FILM COATED ORAL 2 TIMES DAILY
Qty: 60 TABLET | Refills: 1 | Status: SHIPPED | OUTPATIENT
Start: 2022-08-18 | End: 2022-11-22

## 2022-08-18 ASSESSMENT — PAIN SCALES - GENERAL: PAINLEVEL: EXTREME PAIN (8)

## 2022-08-18 NOTE — PROGRESS NOTES
"  Assessment & Plan     Left foot pain  Small toe sprain.  No fx on xray.  I lacie taped them.    - XR Foot Left G/E 3 Views; Future  - nabumetone (RELAFEN) 500 MG tablet; Take 1 tablet (500 mg) by mouth 2 times daily    Chronic midline low back pain without sciatica  Ongoing.  No help with meloxicam.  Sent relafen.  Take with food and follow.    - nabumetone (RELAFEN) 500 MG tablet; Take 1 tablet (500 mg) by mouth 2 times daily    Leg weakness, bilateral  Ongoing.  Offer outpatient PT.  She will hold off for now.  Using walker.      Dysthymia  Much improved with the prozac.  Continue that at this time.  Follow.                     No follow-ups on file.    Pelon David MD  [facility]    Juliano García is a 72 year old, presenting for the following health issues:  Generalized Weakness      HPI     Concern - weakness , left little toe pain   Onset: ongoing   Description:  Bumped her left little toe. Having difficulty with walking painful   Intensity: 8/10  Progression of Symptoms:  same  Accompanying Signs & Symptoms: toe pain   Therapies tried and outcome: taped her toes together , tylenol         Review of Systems   Constitutional, HEENT, cardiovascular, pulmonary, gi and gu systems are negative, except as otherwise noted.      Objective    /70 (BP Location: Left arm, Patient Position: Chair, Cuff Size: Adult Regular)   Pulse 116   Temp 97.3  F (36.3  C) (Tympanic)   Ht 1.575 m (5' 2\")   Wt 54 kg (119 lb)   SpO2 96%   BMI 21.77 kg/m    Body mass index is 21.77 kg/m .  Physical Exam   GENERAL: healthy, alert and no distress  NECK: no adenopathy, no asymmetry, masses, or scars and thyroid normal to palpation  RESP: lungs clear to auscultation - no rales, rhonchi or wheezes  CV: regular rate and rhythm, normal S1 S2, no S3 or S4, no murmur, click or rub, no peripheral edema and peripheral pulses strong  ABDOMEN: soft, nontender, no hepatosplenomegaly, no masses and bowel " sounds normal  MS: tender small toe left foot.  No bruising.      Xray reviewed with her no apparent fx.                  .  ..

## 2022-09-04 ENCOUNTER — HEALTH MAINTENANCE LETTER (OUTPATIENT)
Age: 72
End: 2022-09-04

## 2022-10-21 DIAGNOSIS — I10 BENIGN ESSENTIAL HYPERTENSION: ICD-10-CM

## 2022-10-21 DIAGNOSIS — E78.5 HYPERLIPIDEMIA, UNSPECIFIED HYPERLIPIDEMIA TYPE: ICD-10-CM

## 2022-10-21 DIAGNOSIS — F34.1 DYSTHYMIA: ICD-10-CM

## 2022-10-24 RX ORDER — ATORVASTATIN CALCIUM 40 MG/1
TABLET, FILM COATED ORAL
Qty: 90 TABLET | Refills: 1 | Status: SHIPPED | OUTPATIENT
Start: 2022-10-24 | End: 2023-04-20

## 2022-10-24 RX ORDER — CITALOPRAM HYDROBROMIDE 40 MG/1
TABLET ORAL
Qty: 90 TABLET | Refills: 2 | Status: SHIPPED | OUTPATIENT
Start: 2022-10-24 | End: 2023-07-18

## 2022-10-24 RX ORDER — AMLODIPINE BESYLATE 10 MG/1
10 TABLET ORAL DAILY
Qty: 90 TABLET | Refills: 3 | Status: SHIPPED | OUTPATIENT
Start: 2022-10-24 | End: 2023-08-15

## 2022-10-24 NOTE — TELEPHONE ENCOUNTER
Lipitor      Last Written Prescription Date:  04/28/22  Last Fill Quantity: 90,   # refills: 1  Last Office Visit: 08/18/22  Future Office visit:         Celexa      Last Written Prescription Date:  07/27/22  Last Fill Quantity: 90,   # refills: 3  Last Office Visit: 08/18/22  Future Office visit:       Norvasc      Last Written Prescription Date:  11/15/21  Last Fill Quantity: 90,   # refills: 3  Last Office Visit: 08/18/22  Future Office visit:       Routing refill request to provider for review/approval because:  Drug not active on patient's medication list, states therapy completed on 08/18/22

## 2022-11-22 DIAGNOSIS — R21 RASH: ICD-10-CM

## 2022-11-22 DIAGNOSIS — M79.672 LEFT FOOT PAIN: ICD-10-CM

## 2022-11-22 DIAGNOSIS — G89.29 CHRONIC MIDLINE LOW BACK PAIN WITHOUT SCIATICA: ICD-10-CM

## 2022-11-22 DIAGNOSIS — M54.50 CHRONIC MIDLINE LOW BACK PAIN WITHOUT SCIATICA: ICD-10-CM

## 2022-11-22 RX ORDER — HYDROXYZINE PAMOATE 25 MG/1
25 CAPSULE ORAL 3 TIMES DAILY PRN
Qty: 120 CAPSULE | Refills: 3 | Status: SHIPPED | OUTPATIENT
Start: 2022-11-22 | End: 2023-12-20

## 2022-11-22 RX ORDER — NABUMETONE 500 MG/1
500 TABLET, FILM COATED ORAL 2 TIMES DAILY
Qty: 60 TABLET | Refills: 1 | Status: SHIPPED | OUTPATIENT
Start: 2022-11-22 | End: 2023-01-16

## 2023-01-09 ENCOUNTER — OFFICE VISIT (OUTPATIENT)
Dept: PODIATRY | Facility: OTHER | Age: 73
End: 2023-01-09
Attending: FAMILY MEDICINE
Payer: MEDICARE

## 2023-01-09 ENCOUNTER — ANCILLARY PROCEDURE (OUTPATIENT)
Dept: GENERAL RADIOLOGY | Facility: OTHER | Age: 73
End: 2023-01-09
Attending: PODIATRIST
Payer: MEDICARE

## 2023-01-09 VITALS
TEMPERATURE: 97.7 F | SYSTOLIC BLOOD PRESSURE: 156 MMHG | OXYGEN SATURATION: 94 % | HEART RATE: 96 BPM | DIASTOLIC BLOOD PRESSURE: 88 MMHG

## 2023-01-09 DIAGNOSIS — M79.672 LEFT FOOT PAIN: ICD-10-CM

## 2023-01-09 DIAGNOSIS — M77.42 METATARSALGIA OF LEFT FOOT: ICD-10-CM

## 2023-01-09 DIAGNOSIS — F11.90 CHRONIC, CONTINUOUS USE OF OPIOIDS: ICD-10-CM

## 2023-01-09 DIAGNOSIS — R26.89 ANTALGIC GAIT: ICD-10-CM

## 2023-01-09 DIAGNOSIS — Z79.899 CONTROLLED SUBSTANCE AGREEMENT SIGNED: ICD-10-CM

## 2023-01-09 DIAGNOSIS — M43.16 SPONDYLOLISTHESIS AT L4-L5 LEVEL: Chronic | ICD-10-CM

## 2023-01-09 DIAGNOSIS — M77.42 METATARSALGIA OF LEFT FOOT: Primary | ICD-10-CM

## 2023-01-09 DIAGNOSIS — F17.210 CIGARETTE NICOTINE DEPENDENCE WITHOUT COMPLICATION: ICD-10-CM

## 2023-01-09 DIAGNOSIS — Z71.6 TOBACCO ABUSE COUNSELING: ICD-10-CM

## 2023-01-09 PROCEDURE — 73630 X-RAY EXAM OF FOOT: CPT | Mod: TC,LT

## 2023-01-09 PROCEDURE — 99203 OFFICE O/P NEW LOW 30 MIN: CPT | Performed by: PODIATRIST

## 2023-01-09 PROCEDURE — G0463 HOSPITAL OUTPT CLINIC VISIT: HCPCS

## 2023-01-09 PROCEDURE — G0463 HOSPITAL OUTPT CLINIC VISIT: HCPCS | Mod: 25

## 2023-01-09 ASSESSMENT — PAIN SCALES - GENERAL: PAINLEVEL: EXTREME PAIN (8)

## 2023-01-09 NOTE — LETTER
1/9/2023         RE: Raquel Sanchez  [address] [facility] [address]        Dear Colleague,    Thank you for referring your patient, Raquel Sanchez, to the [facility]. Please see a copy of my visit note below.    Chief complaint: Patient presents with:  Musculoskeletal Problem: Ankle and foot pain      History of Present Illness: This 72 year old female is seen at the request of  for evaluation and suggestions of management of curling of the toes on the LEFT foot. The foot/toes have been curling year and it has progressed even faster in the past few months since around October, 2022. She says she stubbed her LEFT fifth toe a few months ago. All of her toes seemed to curl after that. The toes give her pain, but pain is intermittent. She walks on the side of her pain in her toes. She has pain in her toes when walking and it feels like her entire foot is curving it. She denies any other injury or rolling her ankle.    No further pedal complaints today.     She smokes 1 ppd and she does not want to quit at this time.      BP (!) 156/88 (BP Location: Left arm, Patient Position: Sitting, Cuff Size: Adult Regular)   Pulse 96   Temp 97.7  F (36.5  C) (Tympanic)   SpO2 94%     Patient Active Problem List   Diagnosis     ACP (advance care planning)     Controlled substance agreement signed     Dysthymia     Chronic, continuous use of opioids     Tobacco abuse     Pre-op exam     Hyponatremia     Fall at home, initial encounter     Closed right hip fracture, initial encounter (H)     Alcohol abuse, uncomplicated     Alcohol use     Hyperlipemia     Hypertension     Prolonged Q-T interval on ECG     Weakness     Esophagitis     Generalized muscle weakness     Failure to thrive in adult     COVID-19     Decubitus ulcer of buttock     Acute cystitis without hematuria     Protein-calorie malnutrition (H)     Pleural effusion     Chronic low back pain     Recurrent major depressive disorder, in  remission (H)     Spondylolisthesis at L4-L5 level       Past Surgical History:   Procedure Laterality Date     GYN SURGERY      hysterectomy     IR CONSULTATION FOR IR EXAM  1/4/2022       Current Outpatient Medications   Medication     acetaminophen (TYLENOL) 325 MG tablet     atorvastatin (LIPITOR) 40 MG tablet     citalopram (CELEXA) 20 MG tablet     clonazePAM (KLONOPIN) 0.5 MG tablet     hydrOXYzine (VISTARIL) 25 MG capsule     miconazole (MICATIN) 2 % external powder     Multiple Vitamins-Minerals (MULTIVITAMIN ADULTS 50+) TABS     nabumetone (RELAFEN) 500 MG tablet     potassium chloride ER (K-TAB) 20 MEQ CR tablet     triamcinolone (ARISTOCORT HP) 0.5 % external cream     amLODIPine (NORVASC) 10 MG tablet     citalopram (CELEXA) 40 MG tablet     No current facility-administered medications for this visit.          Allergies   Allergen Reactions     Tape [Adhesive Tape] Blisters     Seasonal Allergies      Poison Ivy Extract [Extract Of Poison Riddhi] Rash       Family History   Problem Relation Age of Onset     Diabetes No family hx of      Asthma No family hx of      Thyroid Disease No family hx of      Hypertension No family hx of      Hyperlipidemia No family hx of        Social History     Socioeconomic History     Marital status:      Spouse name: None     Number of children: None     Years of education: None     Highest education level: None   Tobacco Use     Smoking status: Every Day     Packs/day: 1.00     Years: 55.00     Pack years: 55.00     Types: Cigarettes     Start date: 1/1/1966     Smokeless tobacco: Never     Tobacco comments:     Pt denies quit plan 1/9/2023   Substance and Sexual Activity     Alcohol use: Yes     Alcohol/week: 0.0 standard drinks     Comment: occasional     Drug use: No   Other Topics Concern     Parent/sibling w/ CABG, MI or angioplasty before 65F 55M? No      Service No     Blood Transfusions Yes     Comment: Permits if needed     Seat Belt Yes       ROS:  10 point ROS neg other than the symptoms noted above in the HPI.  EXAM  Constitutional: healthy, alert and no distress    Psychiatric: mentation appears normal and affect normal/bright    VASCULAR:  -Dorsalis pedis pulse +1/4 b/l  -Posterior tibial pulse +1/4 b/l  -Capillary refill time < 3 seconds to b/l hallux  NEURO:  -Light touch sensation intact to b/l plantar forefoot  DERM:  -Skin temperature, texture and turgor WNL b/l  -Toenails elongated, thickened, dystrophic and discolored x 10  MSK:  -Moderate pain on palpation to the distal dorsal and plantar left 2nd - 4th metatarsal heads  -Pain on palpation to the LEFT 2nd - 4th MTPJ on the plantar and dorsal surface  -Pain along the dorsal 2nd - 4th MTPJs with passive PLANTARFLEXION of the 2nd - 4th toes    -Muscle strength of ankles +5/5 for dorsiflexion, plantarflexion, ABDUction and ADDuction b/l  -LEFT ankle in a plantarflexed position and mildly inverted from guarding  ---Able to straighten ankle into a rectus position when relaxed  ---Able to dorsiflex and plantarflex ankle but DORSIFLEXION and PLANTARFLEXION of MTPs is limited    -Ankle joint passive ROM within normal limits except for dorsiflexion:    Dorsiflexion, RIGHT Straight knee +5 to +10 degrees (within normal limits)    Dorsiflexion, LEFT Straight knee 0 degrees but guarding -- will not relax ankle to evaluate true DORSIFLEXION of the LEFT ankle    LEFT FOOT RADIOGRAPHS 08/18/2022  Impression:  No evidence of acute or subacute bony abnormality.   Generalized osteopenia and scattered degenerative changes with  suggestion of old healed distal third and fourth metatarsal fractures.     ALEXIS KUMAR MD   ============================================================    ASSESSMENT:  (M77.42) Metatarsalgia of left foot  (primary encounter diagnosis)    (M79.672) Left foot pain    (R26.89) Antalgic gait    (F17.210) Cigarette nicotine dependence without complication    (Z71.6) Tobacco abuse  counseling    (M43.16) Spondylolisthesis at L4-L5 level    (F11.90) Chronic, continuous use of opioids    (Z79.899) Controlled substance agreement signed      PLAN:  -Patient evaluated and examined. Treatment options discussed with no educational barriers noted.    Metatarsalgia / MTPJ pain:  -Discussed potential causes and treatment options for metatarsalgia. Pain along the metatarsals can be caused from a number of factors, but is commonly caused by an imbalance of the biomechanics. This can include but is not limited to a flat foot, high arched foot, tendon imbalance, gastrocnemius equinus, and compensation for pain in other areas of the foot. In this case, patient has moderate pain along the dorsal 2nd - 4th distal metatarsals. She has a plantarflexion and inversion of the LEFT ankle which could be partially secondary to her history of back injuries / pain, but she is able to bring the foot to a neutral position when she relaxes her foot and ankle.    ---Evaluated radiographs from 08/2022. There is mild lateral angulation of the 3rd and 4th metatarsal heads at the neck of the metatarsal. This may have been from a previous fracture from a previous injury or a pathologic fracture. She has no recollection of a fall / ankle sprain / other injury other than jamming her LEFT fifth toe a few months ago. There is osteoarthritis at the IPJs, but this does not appear to cause her pain.    ---Patient's pain is moderate in the forefoot near the metatarsal heads as well as the MTPJs. She is guarding the foot and walking on her heel and the lateral foot. The pain has been getting worse over the past few months. Her most recent x-ray was five months ago.  ---New radiographs ordered on 01/02/2023 -- no concerning changes (no obvious fractures) with noted osteopenia. Patient will be called with the results.  ---Patient's pain has been progressively worsening. She is consistently walking on her lateral foot with a walker. She  initially appeared to have an ankle contracture in a plantarflexed and inverted position, but she was able to straighten her ankle into a neutral position once she stopped guarding the foot. The pain she is guarding is in the distal metatarsal heads 2-4 of the LEFT foot with additional pain in the MTPJs 2-4. The angulation of the 3rd and 4th metatarsal head appears as if she possibly had a previous fracture of the metatarsal neck. Will order an MRI to evaluate for a stress fracture, AVN, or other pathology due to her increasing pain and guarding of the foot.  ---Will consider a CMO with a metatarsal pad, possible ankle brace, and possible PT. Patient is not open to physical therapy and she says she does not want to receive exercises to do at home because she already has too many other exercises to do at home. She may benefit from ankle strengthening exercises since she has been favoring her foot for a few months. Will first evaluate the MRI for a more acute injury and discuss treatment following the MRI. She is in agreement with this plan. She has a walker for offloading in the meantime. She may also consider a post-op shoe. After her x-rays, she was called to see if she wanted to  a post-op shoe but she didn't answer the phone. Will keep trying to reach her. Discussed with her at her appointment, however, that post-op shoes can be clunky. She should careful because it can make it easier to trip. It may possibly offload the forefoot enough to reduce the forefoot pain. She would like to try it.    -Left foot MRI ordered on 01/09/2023 - will call patient with results    ---Conservative treatment options consist of wider shoe gear and orthotics +/- padding/splinting to correct the biomechanics of the foot. Consistently wearing supportive shoe gear can also decrease this pain (stability shoe gear involves wearing a stability shoe that bends at the toe, but has a solid midfoot shank and heel contour). She cannot  currently fit a shoe on her LEFT foot because of the pain in the ball of the foot. Will consider a CMO or OTC insert with a metatarsal pad if the MRI is negative for a fracture or AVN.     -Tobacco cessation discussed with patient including the benefits of quitting and the risks of not quitting. The Quit Plan referral was offered and the patient is not interested in the referral today. Patient is not interested in quitting today.    Total time spent preparing to see the patient, review of chart, obtaining history and physical examination, review of x-rays, ordering of x-rays, discussing MRI and other treatment options, education, discussion with patient and documenting in Epic / EMR was 35 minutes. This did not include procedure time.  All time involved was spent on the day of service for the patient (the same day as the patient's appointment).    -Patient in agreement with the above treatment plan and all of patient's questions were answered.      Return to clinic after MRI        Erin Phelan DPM      Again, thank you for allowing me to participate in the care of your patient.        Sincerely,        Erin Phelan DPM

## 2023-01-09 NOTE — PROGRESS NOTES
Chief complaint: Patient presents with:  Musculoskeletal Problem: Ankle and foot pain      History of Present Illness: This 72 year old female is seen at the request of  for evaluation and suggestions of management of curling of the toes on the LEFT foot. The foot/toes have been curling year and it has progressed even faster in the past few months since around October, 2022. She says she stubbed her LEFT fifth toe a few months ago. All of her toes seemed to curl after that. The toes give her pain, but pain is intermittent. She walks on the side of her pain in her toes. She has pain in her toes when walking and it feels like her entire foot is curving it. She denies any other injury or rolling her ankle.    No further pedal complaints today.     She smokes 1 ppd and she does not want to quit at this time.      BP (!) 156/88 (BP Location: Left arm, Patient Position: Sitting, Cuff Size: Adult Regular)   Pulse 96   Temp 97.7  F (36.5  C) (Tympanic)   SpO2 94%     Patient Active Problem List   Diagnosis     ACP (advance care planning)     Controlled substance agreement signed     Dysthymia     Chronic, continuous use of opioids     Tobacco abuse     Pre-op exam     Hyponatremia     Fall at home, initial encounter     Closed right hip fracture, initial encounter (H)     Alcohol abuse, uncomplicated     Alcohol use     Hyperlipemia     Hypertension     Prolonged Q-T interval on ECG     Weakness     Esophagitis     Generalized muscle weakness     Failure to thrive in adult     COVID-19     Decubitus ulcer of buttock     Acute cystitis without hematuria     Protein-calorie malnutrition (H)     Pleural effusion     Chronic low back pain     Recurrent major depressive disorder, in remission (H)     Spondylolisthesis at L4-L5 level       Past Surgical History:   Procedure Laterality Date     GYN SURGERY      hysterectomy     IR CONSULTATION FOR IR EXAM  1/4/2022       Current Outpatient Medications   Medication      acetaminophen (TYLENOL) 325 MG tablet     atorvastatin (LIPITOR) 40 MG tablet     citalopram (CELEXA) 20 MG tablet     clonazePAM (KLONOPIN) 0.5 MG tablet     hydrOXYzine (VISTARIL) 25 MG capsule     miconazole (MICATIN) 2 % external powder     Multiple Vitamins-Minerals (MULTIVITAMIN ADULTS 50+) TABS     nabumetone (RELAFEN) 500 MG tablet     potassium chloride ER (K-TAB) 20 MEQ CR tablet     triamcinolone (ARISTOCORT HP) 0.5 % external cream     amLODIPine (NORVASC) 10 MG tablet     citalopram (CELEXA) 40 MG tablet     No current facility-administered medications for this visit.          Allergies   Allergen Reactions     Tape [Adhesive Tape] Blisters     Seasonal Allergies      Poison Ivy Extract [Extract Of Poison Riddhi] Rash       Family History   Problem Relation Age of Onset     Diabetes No family hx of      Asthma No family hx of      Thyroid Disease No family hx of      Hypertension No family hx of      Hyperlipidemia No family hx of        Social History     Socioeconomic History     Marital status:      Spouse name: None     Number of children: None     Years of education: None     Highest education level: None   Tobacco Use     Smoking status: Every Day     Packs/day: 1.00     Years: 55.00     Pack years: 55.00     Types: Cigarettes     Start date: 1/1/1966     Smokeless tobacco: Never     Tobacco comments:     Pt denies quit plan 1/9/2023   Substance and Sexual Activity     Alcohol use: Yes     Alcohol/week: 0.0 standard drinks     Comment: occasional     Drug use: No   Other Topics Concern     Parent/sibling w/ CABG, MI or angioplasty before 65F 55M? No      Service No     Blood Transfusions Yes     Comment: Permits if needed     Seat Belt Yes       ROS: 10 point ROS neg other than the symptoms noted above in the HPI.  EXAM  Constitutional: healthy, alert and no distress    Psychiatric: mentation appears normal and affect normal/bright    VASCULAR:  -Dorsalis pedis pulse +1/4  b/l  -Posterior tibial pulse +1/4 b/l  -Capillary refill time < 3 seconds to b/l hallux  NEURO:  -Light touch sensation intact to b/l plantar forefoot  DERM:  -Skin temperature, texture and turgor WNL b/l  -Toenails elongated, thickened, dystrophic and discolored x 10  MSK:  -Moderate pain on palpation to the distal dorsal and plantar left 2nd - 4th metatarsal heads  -Pain on palpation to the LEFT 2nd - 4th MTPJ on the plantar and dorsal surface  -Pain along the dorsal 2nd - 4th MTPJs with passive PLANTARFLEXION of the 2nd - 4th toes    -Muscle strength of ankles +5/5 for dorsiflexion, plantarflexion, ABDUction and ADDuction b/l  -LEFT ankle in a plantarflexed position and mildly inverted from guarding  ---Able to straighten ankle into a rectus position when relaxed  ---Able to dorsiflex and plantarflex ankle but DORSIFLEXION and PLANTARFLEXION of MTPs is limited    -Ankle joint passive ROM within normal limits except for dorsiflexion:    Dorsiflexion, RIGHT Straight knee +5 to +10 degrees (within normal limits)    Dorsiflexion, LEFT Straight knee 0 degrees but guarding -- will not relax ankle to evaluate true DORSIFLEXION of the LEFT ankle    LEFT FOOT RADIOGRAPHS 08/18/2022  Impression:  No evidence of acute or subacute bony abnormality.   Generalized osteopenia and scattered degenerative changes with  suggestion of old healed distal third and fourth metatarsal fractures.     ALEXIS KUMAR MD   ============================================================    ASSESSMENT:  (M77.42) Metatarsalgia of left foot  (primary encounter diagnosis)    (M79.672) Left foot pain    (R26.89) Antalgic gait    (F17.210) Cigarette nicotine dependence without complication    (Z71.6) Tobacco abuse counseling    (M43.16) Spondylolisthesis at L4-L5 level    (F11.90) Chronic, continuous use of opioids    (Z79.899) Controlled substance agreement signed      PLAN:  -Patient evaluated and examined. Treatment options discussed with gabby  educational barriers noted.    Metatarsalgia / MTPJ pain:  -Discussed potential causes and treatment options for metatarsalgia. Pain along the metatarsals can be caused from a number of factors, but is commonly caused by an imbalance of the biomechanics. This can include but is not limited to a flat foot, high arched foot, tendon imbalance, gastrocnemius equinus, and compensation for pain in other areas of the foot. In this case, patient has moderate pain along the dorsal 2nd - 4th distal metatarsals. She has a plantarflexion and inversion of the LEFT ankle which could be partially secondary to her history of back injuries / pain, but she is able to bring the foot to a neutral position when she relaxes her foot and ankle.    ---Evaluated radiographs from 08/2022. There is mild lateral angulation of the 3rd and 4th metatarsal heads at the neck of the metatarsal. This may have been from a previous fracture from a previous injury or a pathologic fracture. She has no recollection of a fall / ankle sprain / other injury other than jamming her LEFT fifth toe a few months ago. There is osteoarthritis at the IPJs, but this does not appear to cause her pain.    ---Patient's pain is moderate in the forefoot near the metatarsal heads as well as the MTPJs. She is guarding the foot and walking on her heel and the lateral foot. The pain has been getting worse over the past few months. Her most recent x-ray was five months ago.  ---New radiographs ordered on 01/02/2023 -- no concerning changes (no obvious fractures) with noted osteopenia. Patient will be called with the results.  ---Patient's pain has been progressively worsening. She is consistently walking on her lateral foot with a walker. She initially appeared to have an ankle contracture in a plantarflexed and inverted position, but she was able to straighten her ankle into a neutral position once she stopped guarding the foot. The pain she is guarding is in the distal  metatarsal heads 2-4 of the LEFT foot with additional pain in the MTPJs 2-4. The angulation of the 3rd and 4th metatarsal head appears as if she possibly had a previous fracture of the metatarsal neck. Will order an MRI to evaluate for a stress fracture, AVN, or other pathology due to her increasing pain and guarding of the foot.  ---Will consider a CMO with a metatarsal pad, possible ankle brace, and possible PT. Patient is not open to physical therapy and she says she does not want to receive exercises to do at home because she already has too many other exercises to do at home. She may benefit from ankle strengthening exercises since she has been favoring her foot for a few months. Will first evaluate the MRI for a more acute injury and discuss treatment following the MRI. She is in agreement with this plan. She has a walker for offloading in the meantime. She may also consider a post-op shoe. After her x-rays, she was called to see if she wanted to  a post-op shoe but she didn't answer the phone. Will keep trying to reach her. Discussed with her at her appointment, however, that post-op shoes can be clunky. She should careful because it can make it easier to trip. It may possibly offload the forefoot enough to reduce the forefoot pain. She would like to try it.    -Left foot MRI ordered on 01/09/2023 - will call patient with results    ---Conservative treatment options consist of wider shoe gear and orthotics +/- padding/splinting to correct the biomechanics of the foot. Consistently wearing supportive shoe gear can also decrease this pain (stability shoe gear involves wearing a stability shoe that bends at the toe, but has a solid midfoot shank and heel contour). She cannot currently fit a shoe on her LEFT foot because of the pain in the ball of the foot. Will consider a CMO or OTC insert with a metatarsal pad if the MRI is negative for a fracture or AVN.     -Tobacco cessation discussed with patient  including the benefits of quitting and the risks of not quitting. The Quit Plan referral was offered and the patient is not interested in the referral today. Patient is not interested in quitting today.    Total time spent preparing to see the patient, review of chart, obtaining history and physical examination, review of x-rays, ordering of x-rays, discussing MRI and other treatment options, education, discussion with patient and documenting in Epic / EMR was 35 minutes. This did not include procedure time.  All time involved was spent on the day of service for the patient (the same day as the patient's appointment).    -Patient in agreement with the above treatment plan and all of patient's questions were answered.      Return to clinic after MRI        Erin Phelan DPM

## 2023-01-11 DIAGNOSIS — G89.29 CHRONIC MIDLINE LOW BACK PAIN WITHOUT SCIATICA: ICD-10-CM

## 2023-01-11 DIAGNOSIS — M54.50 CHRONIC MIDLINE LOW BACK PAIN WITHOUT SCIATICA: ICD-10-CM

## 2023-01-11 DIAGNOSIS — M79.672 LEFT FOOT PAIN: ICD-10-CM

## 2023-01-16 RX ORDER — NABUMETONE 500 MG/1
TABLET, FILM COATED ORAL
Qty: 60 TABLET | Refills: 1 | Status: SHIPPED | OUTPATIENT
Start: 2023-01-16 | End: 2023-04-20

## 2023-01-16 NOTE — TELEPHONE ENCOUNTER
Relafen       Last Written Prescription Date:  11/22/22  Last Fill Quantity: 60,   # refills: 1  Last Office Visit: 8/18/22  Future Office visit:

## 2023-01-21 ENCOUNTER — HOSPITAL ENCOUNTER (OUTPATIENT)
Dept: MRI IMAGING | Facility: HOSPITAL | Age: 73
Discharge: HOME | End: 2023-01-21
Attending: PODIATRIST | Admitting: PODIATRIST
Payer: MEDICARE

## 2023-01-21 DIAGNOSIS — M77.42 METATARSALGIA OF LEFT FOOT: ICD-10-CM

## 2023-01-21 DIAGNOSIS — M79.672 LEFT FOOT PAIN: ICD-10-CM

## 2023-01-21 DIAGNOSIS — R26.89 ANTALGIC GAIT: ICD-10-CM

## 2023-01-21 PROCEDURE — 73718 MRI LOWER EXTREMITY W/O DYE: CPT | Mod: LT,MG

## 2023-01-23 DIAGNOSIS — F41.9 ANXIETY: ICD-10-CM

## 2023-01-24 RX ORDER — CLONAZEPAM 0.5 MG/1
0.5 TABLET ORAL 2 TIMES DAILY PRN
Qty: 60 TABLET | Refills: 5 | Status: SHIPPED | OUTPATIENT
Start: 2023-01-24 | End: 2023-08-10

## 2023-02-02 ENCOUNTER — OFFICE VISIT (OUTPATIENT)
Dept: PODIATRY | Facility: OTHER | Age: 73
End: 2023-02-02
Attending: PODIATRIST
Payer: MEDICARE

## 2023-02-02 VITALS
TEMPERATURE: 98.5 F | HEART RATE: 101 BPM | SYSTOLIC BLOOD PRESSURE: 160 MMHG | DIASTOLIC BLOOD PRESSURE: 93 MMHG | OXYGEN SATURATION: 93 %

## 2023-02-02 DIAGNOSIS — F17.210 CIGARETTE NICOTINE DEPENDENCE WITHOUT COMPLICATION: ICD-10-CM

## 2023-02-02 DIAGNOSIS — R60.0 LOWER EXTREMITY EDEMA: ICD-10-CM

## 2023-02-02 DIAGNOSIS — G57.62 LESION OF PLANTAR NERVE, LEFT: ICD-10-CM

## 2023-02-02 DIAGNOSIS — M79.672 LEFT FOOT PAIN: ICD-10-CM

## 2023-02-02 DIAGNOSIS — Z71.6 TOBACCO ABUSE COUNSELING: ICD-10-CM

## 2023-02-02 DIAGNOSIS — F11.90 CHRONIC, CONTINUOUS USE OF OPIOIDS: ICD-10-CM

## 2023-02-02 DIAGNOSIS — M43.16 SPONDYLOLISTHESIS AT L4-L5 LEVEL: ICD-10-CM

## 2023-02-02 DIAGNOSIS — Z79.899 CONTROLLED SUBSTANCE AGREEMENT SIGNED: ICD-10-CM

## 2023-02-02 DIAGNOSIS — M77.42 METATARSALGIA OF LEFT FOOT: Primary | ICD-10-CM

## 2023-02-02 DIAGNOSIS — R26.89 ANTALGIC GAIT: ICD-10-CM

## 2023-02-02 PROCEDURE — G0463 HOSPITAL OUTPT CLINIC VISIT: HCPCS

## 2023-02-02 PROCEDURE — 99213 OFFICE O/P EST LOW 20 MIN: CPT | Performed by: PODIATRIST

## 2023-02-02 PROCEDURE — G0463 HOSPITAL OUTPT CLINIC VISIT: HCPCS | Mod: 25

## 2023-02-02 ASSESSMENT — PAIN SCALES - GENERAL: PAINLEVEL: NO PAIN (0)

## 2023-02-02 NOTE — PATIENT INSTRUCTIONS
Thank you for allowing  and our Podiatry team to participate in your care. Please call our office at [phone] with scheduling questions or with any other questions or concerns.

## 2023-02-02 NOTE — PROGRESS NOTES
Chief complaint: Patient presents with:  Foot Pain: Review MRI results      History of Present Illness: This 72 year old female is seen for follow-up management of LEFT foot pain. She had an MRI on 01/23/2023. She has been wearing a post-op shoe to reduce pressure on her foot. She says her pain is a little better, but still very tender to the ball of the foot. Her toes give her pain and it feels like they have been curling more on her since she stubbed her LEFT fifth toe around October, 2022.     No further pedal complaints today.     She smokes 1 ppd and she does not want to quit at this time.      BP (!) 160/93 (BP Location: Left arm, Patient Position: Sitting, Cuff Size: Adult Regular)   Pulse 101   Temp 98.5  F (36.9  C) (Tympanic)   SpO2 93%     Patient Active Problem List   Diagnosis     ACP (advance care planning)     Controlled substance agreement signed     Dysthymia     Chronic, continuous use of opioids     Tobacco abuse     Pre-op exam     Hyponatremia     Fall at home, initial encounter     Closed right hip fracture, initial encounter (H)     Alcohol abuse, uncomplicated     Alcohol use     Hyperlipemia     Hypertension     Prolonged Q-T interval on ECG     Weakness     Esophagitis     Generalized muscle weakness     Failure to thrive in adult     COVID-19     Decubitus ulcer of buttock     Acute cystitis without hematuria     Protein-calorie malnutrition (H)     Pleural effusion     Chronic low back pain     Recurrent major depressive disorder, in remission (H)     Spondylolisthesis at L4-L5 level       Past Surgical History:   Procedure Laterality Date     GYN SURGERY      hysterectomy     IR CONSULTATION FOR IR EXAM  1/4/2022       Current Outpatient Medications   Medication     acetaminophen (TYLENOL) 325 MG tablet     amLODIPine (NORVASC) 10 MG tablet     atorvastatin (LIPITOR) 40 MG tablet     citalopram (CELEXA) 20 MG tablet     citalopram (CELEXA) 40 MG tablet     clonazePAM (KLONOPIN) 0.5 MG  tablet     diclofenac (VOLTAREN) 1 % topical gel     hydrOXYzine (VISTARIL) 25 MG capsule     miconazole (MICATIN) 2 % external powder     Multiple Vitamins-Minerals (MULTIVITAMIN ADULTS 50+) TABS     nabumetone (RELAFEN) 500 MG tablet     potassium chloride ER (K-TAB) 20 MEQ CR tablet     triamcinolone (ARISTOCORT HP) 0.5 % external cream     No current facility-administered medications for this visit.          Allergies   Allergen Reactions     Tape [Adhesive Tape] Blisters     Seasonal Allergies      Poison Ivy Extract [Extract Of Poison Riddhi] Rash       Family History   Problem Relation Age of Onset     Diabetes No family hx of      Asthma No family hx of      Thyroid Disease No family hx of      Hypertension No family hx of      Hyperlipidemia No family hx of        Social History     Socioeconomic History     Marital status:      Spouse name: None     Number of children: None     Years of education: None     Highest education level: None   Tobacco Use     Smoking status: Every Day     Packs/day: 1.00     Years: 55.00     Pack years: 55.00     Types: Cigarettes     Start date: 1/1/1966     Smokeless tobacco: Never     Tobacco comments:     Pt denies quit plan 1/9/2023   Substance and Sexual Activity     Alcohol use: Yes     Alcohol/week: 0.0 standard drinks     Comment: occasional     Drug use: No   Other Topics Concern     Parent/sibling w/ CABG, MI or angioplasty before 65F 55M? No      Service No     Blood Transfusions Yes     Comment: Permits if needed     Seat Belt Yes       ROS: 10 point ROS neg other than the symptoms noted above in the HPI.  EXAM  Constitutional: healthy, alert and no distress    Psychiatric: mentation appears normal and affect normal/bright    LEFT FOOT FOCUSED    VASCULAR:  -Dorsalis pedis pulse +1/4   ---DP monophasic on doppler  -Posterior tibial pulse +0/4 b/l secondary to lower extremity edema  ---PT biphasic on doppler on 02/02/2023  -Capillary refill time < 3  seconds to hallux  NEURO:  -Light touch sensation intact to b/l plantar forefoot  DERM:  -Skin temperature, texture and turgor WNL b/l  -Toenails elongated, thickened, dystrophic and discolored x 10  MSK:  -Mild tenderness on palpation to the distal dorsal and plantar left 2nd - 4th metatarsal heads  -Pain on palpation to the LEFT third intermetatarsal head interspace    -Muscle strength of ankles +5/5 for dorsiflexion, plantarflexion, ABDUction and ADDuction  -LEFT ankle in a mildly resting plantarflexed position but able to dorsiflex the ankle to a neutral position  ---Able to dorsiflex and plantarflex ankle but DORSIFLEXION and PLANTARFLEXION of MTPs is limited    -Ankle joint passive ROM within normal limits except for dorsiflexion:    Dorsiflexion, RIGHT Straight knee +5 to +10 degrees (within normal limits)    Dorsiflexion, LEFT Straight knee 0 degrees but guarding -- will not relax ankle to evaluate true DORSIFLEXION of the LEFT ankle    LEFT FOOT RADIOGRAPHS 08/18/2022  Impression:  No evidence of acute or subacute bony abnormality.   Generalized osteopenia and scattered degenerative changes with  suggestion of old healed distal third and fourth metatarsal fractures.     ALEXIS KUMAR MD     LEFT FOOT MRI 01/21/2023  IMPRESSION: Degenerative arthritic changes of the midfoot articulations.  Anatomic detail moderately to severely degraded due to motion artifact on most sequences. There is no evidence of metatarsal fracture.   PASTOR XAVIER MD     ============================================================    ASSESSMENT:  (M77.42) Metatarsalgia of left foot  (primary encounter diagnosis)    (G57.62) Lesion of plantar nerve, left    (M79.672) Left foot pain    (R26.89) Antalgic gait    (F17.210) Cigarette nicotine dependence without complication    (Z71.6) Tobacco abuse counseling    (M43.16) Spondylolisthesis at L4-L5 level    (F11.90) Chronic, continuous use of opioids    (Z79.899) Controlled substance  agreement signed    (R60.0) Lower extremity edema      PLAN:  -Patient evaluated and examined. Treatment options discussed with no educational barriers noted.    Metatarsalgia / MTPJ pain:  -Reviewed MRI results with patient from 01/23/2023. There was too much motion on the images to get a very accurate evaluation. A metatarsal fracture was not noted, however, and she had DJD to the midfoot. She has minimal pain on the midfoot with palpation or from walking.  -Patient does have pain in the LEFT third distal intermetatarsal head interspace which is consistent with neuroma pain:    -Discussed neuroma pain and treatment options:  ---Etiology usually starts with the biomechanics of the patient's foot. Patient has a moderate pes planus which can contribute to the intermetatarsal nerves being pinched which can then develop into a neuroma. Tight shoe gear can also contribute to this pain. Pointed out on the patient's foot how this can worsen neuromas and pain caused by them. Patient also has lower extremity edema which can cause increased pressure and pain on structures of the foot.  ---Conservative treatment options may consist of an injection and/or a metatarsal pad added to the liner of a stability tennis shoe.    -For her generalized pain as well as neuroma pain, the following treatments may be considered:  ---Physical therapy may work on iontophoresis and gait training.   ---A steroid injection for the neuroma may temporarily relieve pain. This may be for a few days or it may last a few months.  ---An orthotic may still be considered with a metatarsal pad and with metatarsal head offloading. She cannot fit a shoe on her foot, but she may also try this in a large slipper with a closed heel.   ---Voltaren gel may be applied to the areas of pain in the foot. This may reduce some of her pain.   ---Mild compression to the leg to control the edema may improve her foot pain. If she tries this, a TubiGrip may be considered.  She may try wearing this during the day, but she should remove it at night.    -Patient declined all treatment options today except for she would like to try Voltaren gel and compression. A surgical correction is not advised at this time.  ---Voltaren gel ordered on 02/02/2023.  ---Compression (Tubi  Size E) was fit for the patient's leg. She may hand wash this at night and wear it only during the day to see if it decreases some of her foot pain.  ---Patient advised to call if pain increases or if she'd like to try one of the other treatment options. She is in agreement with this plan.  -Patient has spondylolithesis at L4/L5 which can increase foot pain. She also has a controlled substance agreement for opioid use which can decrease her pain response to certain treatment options.     ---Evaluated radiographs from 08/2022. There is mild lateral angulation of the 3rd and 4th metatarsal heads at the neck of the metatarsal. This may have been from a previous fracture from a previous injury or a pathologic fracture. She has no recollection of a fall / ankle sprain / other injury other than jamming her LEFT fifth toe a few months ago. There is osteoarthritis at the IPJs, but this does not appear to cause her pain.       -Tobacco cessation discussed with patient including the benefits of quitting and the risks of not quitting. The Quit Plan referral was offered and the patient is not interested in the referral today. Patient is not interested in quitting today.    Total time spent preparing to see the patient, review of chart, obtaining history and physical examination, review of MRI results, treatment options, education, discussion with patient and documenting in Epic / EMR was 28 minutes. This did not include procedure time.  All time involved was spent on the day of service for the patient (the same day as the patient's appointment).     -Patient in agreement with the above treatment plan and all of patient's  questions were answered.      Return to clinic after MRI        Erin Phelan DPM

## 2023-04-19 DIAGNOSIS — G89.29 CHRONIC MIDLINE LOW BACK PAIN WITHOUT SCIATICA: ICD-10-CM

## 2023-04-19 DIAGNOSIS — M79.672 LEFT FOOT PAIN: ICD-10-CM

## 2023-04-19 DIAGNOSIS — M54.50 CHRONIC MIDLINE LOW BACK PAIN WITHOUT SCIATICA: ICD-10-CM

## 2023-04-19 DIAGNOSIS — E78.5 HYPERLIPIDEMIA, UNSPECIFIED HYPERLIPIDEMIA TYPE: ICD-10-CM

## 2023-04-19 DIAGNOSIS — E87.6 HYPOKALEMIA: ICD-10-CM

## 2023-04-20 RX ORDER — NABUMETONE 500 MG/1
TABLET, FILM COATED ORAL
Qty: 60 TABLET | Refills: 1 | Status: SHIPPED | OUTPATIENT
Start: 2023-04-20 | End: 2023-07-07

## 2023-04-20 RX ORDER — POTASSIUM CHLORIDE 1500 MG/1
20 TABLET, EXTENDED RELEASE ORAL 2 TIMES DAILY
Qty: 180 TABLET | Refills: 3 | Status: SHIPPED | OUTPATIENT
Start: 2023-04-20 | End: 2024-04-15

## 2023-04-20 RX ORDER — ATORVASTATIN CALCIUM 40 MG/1
TABLET, FILM COATED ORAL
Qty: 90 TABLET | Refills: 1 | Status: SHIPPED | OUTPATIENT
Start: 2023-04-20 | End: 2024-01-02

## 2023-04-20 NOTE — TELEPHONE ENCOUNTER
nabumetone (RELAFEN) 500 MG tablet 60 tablet 1 1/16/2023     atorvastatin (LIPITOR) 40 MG tablet 90 tablet 1 10/24/2022     potassium chloride ER (K-TAB) 20 MEQ CR tablet 180 tablet 3 5/12/2022     Last Office Visit: 08/18/22  Future Office visit:    Next 5 appointments (look out 90 days)    May 16, 2023  2:15 PM  (Arrive by 2:00 PM)  [facility] [address]  [facility] [address]  [phone]           Routing refill request to provider for review/approval because:

## 2023-06-03 ENCOUNTER — HEALTH MAINTENANCE LETTER (OUTPATIENT)
Age: 73
End: 2023-06-03

## 2023-06-08 NOTE — MR AVS SNAPSHOT
After Visit Summary   7/31/2018    Raquel Sanchez    MRN: [id number]           Patient Information     Date Of Birth          1950        Visit Information        Provider Department      7/31/2018 10:15 AM Pelon David MD [facility]        Today's Diagnoses     Dysthymia        Anxiety        Rash and nonspecific skin eruption        Tobacco abuse        Tobacco abuse counseling          Care Instructions      HOW TO QUIT SMOKING  Smoking is one of the hardest habits to break. About half of all those who have ever smoked have been able to quit, and most of those (about 70%) who still smoke want to quit. Here are some of the best ways to stop smoking.     KEEP TRYING:  It takes most smokers about 8 tries before they are finally able to fully quit. So, the more often you try and fail, the better your chance of quitting the next time! So, don't give up!    GO COLD TURKEY:  Most ex-smokers quit cold turkey. Trying to cut back gradually doesn't seem to work as well, perhaps because it continues the smoking habit. Also, it is possible to fool yourself by inhaling more while smoking fewer cigarettes. This results in the same amount of nicotine in your body!    GET SUPPORT:  Support programs can make an important difference, especially for the heavy smoker. These groups offer lectures, methods to change your behavior and peer support. Call the free national Quitline for more information. 800-QUIT-NOW (615-195-4463). Low-cost or free programs are offered by many hospitals, local chapters of the American Lung Association (372-405-9234) and the American Cancer Society (566-378-0481). Support at home is important too. Non-smokers can help by offering praise and encouragement. If the smoker fails to quit, encourage them to try again!    OVER-THE-COUNTER MEDICINES:  For those who can't quit on their own, Nicotine Replacement Therapy (NRT) may make quitting much easier. Certain aids such as  the nicotine patch, gum and lozenge are available without a prescription. However, it is best to use these under the guidance of your doctor. The skin patch provides a steady supply of nicotine to the body. Nicotine gum and lozenge gives temporary bursts of low levels of nicotine. Both methods take the edge off the craving for cigarettes. WARNING: If you feel symptoms of nicotine overdose, such as nausea, vomiting, dizziness, weakness, or fast heartbeat, stop using these and see your doctor.    PRESCRIPTION MEDICINES:  After evaluating your smoking patterns and prior attempts at quitting, your doctor may offer a prescription medicine such as bupropion (Zyban, Wellbutrin), varenicline (Chantix, Champix), a niocotine inhaler or nasal spray. Each has its unique advantage and side effects which your doctor can review with you.    HEALTH BENEFITS OF QUITTING:  The benefits of quitting start right away and keep improving the longer you go without smokin minutes: blood pressure and pulse return to normal  8 hours: oxygen levels return to normal  2 days: ability to smell and taste begins to improve as damaged nerves start to regrow  2-3 weeks: circulation and lung function improves  1-9 months: decreased cough, congestion and shortness of breath; less tired  1 year: risk of heart attack decreases by half  5 years: risk of lung cancer decreases by half; risk of stroke becomes the same as a non-smoker  For information about how to quit smoking, visit the following links:  National Cancer [place] ,   Clearing the Air, Quit Smoking Today   - an online booklet. http://www.smokefree.gov/pubs/clearing_the_air.pdf  Smokefree.gov http://smokefree.gov/  QuitNet http://www.quitnet.com/    8279-8952 Salvador Alarcon, [address] [place] [address], [place] [address]. All rights reserved. This information is not intended as a substitute for professional medical care. Always follow your healthcare professional's instructions.    The  Benefits of Living Smoke Free  What do you want to gain from quitting? Check off some reasons to quit.  Health Benefits  ___ Reduce my risk of lung cancer, heart disease, chronic lung disease  ___ Have fewer wrinkles and softer skin  ___ Improve my sense of taste and smell  ___ For pregnant women--reduce the risk of having a miscarriage, stillbirth, premature birth, or low-birth-weight baby  Personal Benefits  ___ Feel more in control of my life  ___ Have better-smelling hair, breath, clothes, home, and car  ___ Save time by not having to take smoke breaks, buy cigarettes, or hunt for a light  ___ Have whiter teeth  Family Benefits  ___ Reduce my children s respiratory tract infections  ___ Set a good example for my children  ___ Reduce my family s cancer risk  Financial Benefits  ___ Save hundreds of dollars each year that would be spent on cigarettes  ___ Save money on medical bills  ___ Save on life, health, and car insurance premiums    Those Dollars Add Up!  Cigarettes are expensive, and getting more expensive all the time. Do you realize how much money you are spending on cigarettes per year? What is the average amount you spend on a pack of cigarettes? What is the average number of packs that you smoke per day? Using your answers to these questions, fill in this formula to help you find out:  ($ _____ per pack) ×  ( _____ number of packs per day) × (365 days) =  $ _____ yearly cost of smoking  Besides tobacco, there are other costs, including extra cleaning bills and replacement costs for clothing and furniture; medical expenses for smoking-related illnesses; and higher health, life, and car insurance premiums.    Cigars and Pipes Count Too!  Cigars and pipes are also dangerous. So are smokeless (chewing) tobacco and snuff. All of these products contain nicotine, a highly addictive substance that has harmful effects on your body. Quitting smoking means giving up all tobacco products.      8674-7003 Salvador  "Agnes, [address] [place] [address]. All rights reserved. This information is not intended as a substitute for professional medical care. Always follow your healthcare professional's instructions.          Follow-ups after your visit        Who to contact     If you have questions or need follow up information about today's clinic visit or your schedule please contact [facility] directly at [phone].  Normal or non-critical lab and imaging results will be communicated to you by Musistichart, letter or phone within 4 business days after the clinic has received the results. If you do not hear from us within 7 days, please contact the clinic through [email or web address] or phone. If you have a critical or abnormal lab result, we will notify you by phone as soon as possible.  Submit refill requests through Oximity or call your pharmacy and they will forward the refill request to us. Please allow 3 business days for your refill to be completed.          Additional Information About Your Visit        Musistichart Information     Oximity gives you secure access to your electronic health record. If you see a primary care provider, you can also send messages to your care team and make appointments. If you have questions, please call your primary care clinic.  If you do not have a primary care provider, please call [phone] and they will assist you.        Care EveryWhere ID     This is your Care EveryWhere ID. This could be used by other organizations to access your [place] medical records  [id number]        Your Vitals Were     Pulse Temperature Height Pulse Oximetry BMI (Body Mass Index)       81 97.4  F (36.3  C) 5' 2.5\" (1.588 m) 93% 23.58 kg/m2        Blood Pressure from Last 3 Encounters:   07/31/18 126/78   01/10/18 136/76   11/29/17 120/82    Weight from Last 3 Encounters:   07/31/18 131 lb (59.4 kg)   01/10/18 126 lb 3.2 oz (57.2 kg)   11/29/17 127 lb (57.6 kg)              We Performed the " Following     Tobacco Cessation - Order to Satisfy Health Maintenance          Today's Medication Changes          These changes are accurate as of 7/31/18 12:36 PM.  If you have any questions, ask your nurse or doctor.               These medicines have changed or have updated prescriptions.        Dose/Directions    HYDROcodone-acetaminophen 5-325 MG per tablet   Commonly known as:  NORCO   This may have changed:    - how much to take  - how to take this  - when to take this  - reasons to take this  - additional instructions   Used for:  Rash and nonspecific skin eruption   Changed by:  Pelon David MD        Take 1/2 pill twice daily x 7 days, 1/2 pill once daily x 7 days, then stop.   Quantity:  20 tablet   Refills:  0            Where to get your medicines      These medications were sent to [id number] - [place] - [address]  [address] [place] [address]     Phone:  [phone]     citalopram 40 MG tablet         Some of these will need a paper prescription and others can be bought over the counter.  Ask your nurse if you have questions.     Bring a paper prescription for each of these medications     clonazePAM 0.5 MG tablet    HYDROcodone-acetaminophen 5-325 MG per tablet               Information about OPIOIDS     PRESCRIPTION OPIOIDS: WHAT YOU NEED TO KNOW   We gave you an opioid (narcotic) pain medicine. It is important to manage your pain, but opioids are not always the best choice. You should first try all the other options your care team gave you. Take this medicine for as short a time (and as few doses) as possible.     These medicines have risks:    DO NOT drive when on new or higher doses of pain medicine. These medicines can affect your alertness and reaction times, and you could be arrested for driving under the influence (DUI). If you need to use opioids long-term, talk to your care team about driving.    DO NOT operate heave machinery    DO NOT do any other  dangerous activities while taking these medicines.     DO NOT drink any alcohol while taking these medicines.      If the opioid prescribed includes acetaminophen, DO NOT take with any other medicines that contain acetaminophen. Read all labels carefully. Look for the word  acetaminophen  or  Tylenol.  Ask your pharmacist if you have questions or are unsure.    You can get addicted to pain medicines, especially if you have a history of addiction (chemical, alcohol or substance dependence). Talk to your care team about ways to reduce this risk.    Store your pills in a secure place, locked if possible. We will not replace any lost or stolen medicine. If you don t finish your medicine, please throw away (dispose) as directed by your pharmacist. The [place] Pollution Control Agency has more information about safe disposal: https://www.pca.[facility].mn.us/living-green/managing-unwanted-medications.     All opioids tend to cause constipation. Drink plenty of water and eat foods that have a lot of fiber, such as fruits, vegetables, prune juice, apple juice and high-fiber cereal. Take a laxative (Miralax, milk of magnesia, Colace, Senna) if you don t move your bowels at least every other day.          Primary Care Provider Office Phone # Fax #    Pelon David [phone]       [address] [place] [address]        Equal Access to Services     ROHITH BHAT AH: Hadii dean pugh hadasho Soomaali, waaxda luqadaha, qaybta kaalmada adeegyada, aly alfonso. So [facility] [phone].    ATENCIÓN: Si habla español, tiene a pathak disposición servicios gratuitos de asistencia lingüística. Llame al [phone].    We comply with applicable federal civil rights laws and [place] laws. We do not discriminate on the basis of race, color, national origin, age, disability, sex, sexual orientation, or gender identity.            Thank you!     Thank you for choosing [facility]  for  your care. Our goal is always to provide you with excellent care. Hearing back from our patients is one way we can continue to improve our services. Please take a few minutes to complete the written survey that you may receive in the mail after your visit with us. Thank you!             Your Updated Medication List - Protect others around you: Learn how to safely use, store and throw away your medicines at www.disposemymeds.org.          This list is accurate as of 7/31/18 12:36 PM.  Always use your most recent med list.                   Brand Name Dispense Instructions for use Diagnosis    citalopram 40 MG tablet    celeXA    90 tablet    TAKE 1 TABLET DAILY BY MOUTH    Dysthymia       clonazePAM 0.5 MG tablet    klonoPIN    60 tablet    1 tab bid prn    Anxiety       HYDROcodone-acetaminophen 5-325 MG per tablet    NORCO    20 tablet    Take 1/2 pill twice daily x 7 days, 1/2 pill once daily x 7 days, then stop.    Rash and nonspecific skin eruption       hydrOXYzine 25 MG capsule    VISTARIL    120 capsule    Take 1 capsule (25 mg) by mouth 3 times daily as needed for itching    Rash       MULTIVITAMIN ADULTS 50+ Tabs      Take 1 tablet by mouth daily        triamcinolone 0.5 % cream    KENALOG    30 g    APPLY SPARINGLY TO AFFECTED AREA THREE TIMES DAILY.    Dermatitis          Hemigard Postcare Instructions: The HEMIGARD strips are to remain completely dry for at least 5-7 days.

## 2023-07-06 DIAGNOSIS — G89.29 CHRONIC MIDLINE LOW BACK PAIN WITHOUT SCIATICA: ICD-10-CM

## 2023-07-06 DIAGNOSIS — M54.50 CHRONIC MIDLINE LOW BACK PAIN WITHOUT SCIATICA: ICD-10-CM

## 2023-07-06 DIAGNOSIS — M79.672 LEFT FOOT PAIN: ICD-10-CM

## 2023-07-07 RX ORDER — NABUMETONE 500 MG/1
TABLET, FILM COATED ORAL
Qty: 60 TABLET | Refills: 1 | Status: SHIPPED | OUTPATIENT
Start: 2023-07-07 | End: 2023-10-19

## 2023-07-07 NOTE — TELEPHONE ENCOUNTER
nabumetone (RELAFEN) 500 MG tablet 60 tablet 1 4/20/2023     Last Office Visit: 08/18/22.  Future Office visit:    Next 5 appointments (look out 90 days)    Aug 15, 2023  1:45 PM  (Arrive by 1:30 PM)  PHYSICAL with Pelon David MD  [facility] [address] [facility] [address]  [phone]           Routing refill request to provider for review/approval because:

## 2023-07-18 DIAGNOSIS — F34.1 DYSTHYMIA: ICD-10-CM

## 2023-07-18 RX ORDER — CITALOPRAM HYDROBROMIDE 40 MG/1
TABLET ORAL
Qty: 90 TABLET | Refills: 3 | Status: SHIPPED | OUTPATIENT
Start: 2023-07-18 | End: 2024-06-27

## 2023-08-10 DIAGNOSIS — F41.9 ANXIETY: ICD-10-CM

## 2023-08-10 NOTE — TELEPHONE ENCOUNTER
Klonopin      Last Written Prescription Date:  6.22.23  Last Fill Quantity: #60,   # refills: 0  Last Office Visit: 8.18.22  Future Office visit:    Next 5 appointments (look out 90 days)      Aug 15, 2023  1:45 PM  (Arrive by 1:30 PM)  PHYSICAL with Pelon David MD  [facility] [address] [facility] [address]  [phone]             Routing refill request to provider for review/approval because:  Drug not on the [facility], [facility] or [facility] refill protocol or controlled substance

## 2023-08-11 RX ORDER — CLONAZEPAM 0.5 MG/1
0.5 TABLET ORAL 2 TIMES DAILY PRN
Qty: 60 TABLET | Refills: 0 | Status: SHIPPED | OUTPATIENT
Start: 2023-08-11 | End: 2023-08-15

## 2023-08-15 ENCOUNTER — OFFICE VISIT (OUTPATIENT)
Dept: FAMILY MEDICINE | Facility: OTHER | Age: 73
End: 2023-08-15
Attending: FAMILY MEDICINE
Payer: MEDICARE

## 2023-08-15 ENCOUNTER — TELEPHONE (OUTPATIENT)
Dept: FAMILY MEDICINE | Facility: OTHER | Age: 73
End: 2023-08-15

## 2023-08-15 VITALS
DIASTOLIC BLOOD PRESSURE: 78 MMHG | OXYGEN SATURATION: 97 % | SYSTOLIC BLOOD PRESSURE: 138 MMHG | TEMPERATURE: 98.6 F | BODY MASS INDEX: 21.22 KG/M2 | WEIGHT: 116 LBS | HEART RATE: 106 BPM

## 2023-08-15 DIAGNOSIS — R32 URINARY INCONTINENCE, UNSPECIFIED TYPE: ICD-10-CM

## 2023-08-15 DIAGNOSIS — Z78.0 ASYMPTOMATIC MENOPAUSAL STATE: ICD-10-CM

## 2023-08-15 DIAGNOSIS — Z00.00 ENCOUNTER FOR MEDICARE ANNUAL WELLNESS EXAM: Primary | ICD-10-CM

## 2023-08-15 DIAGNOSIS — Z12.31 ENCOUNTER FOR SCREENING MAMMOGRAM FOR MALIGNANT NEOPLASM OF BREAST: ICD-10-CM

## 2023-08-15 DIAGNOSIS — E78.5 HYPERLIPIDEMIA, UNSPECIFIED HYPERLIPIDEMIA TYPE: ICD-10-CM

## 2023-08-15 DIAGNOSIS — I10 PRIMARY HYPERTENSION: ICD-10-CM

## 2023-08-15 DIAGNOSIS — F41.9 ANXIETY: ICD-10-CM

## 2023-08-15 LAB
ALBUMIN UR-MCNC: NEGATIVE MG/DL
APPEARANCE UR: CLEAR
BACTERIA #/AREA URNS HPF: ABNORMAL /HPF
BILIRUB UR QL STRIP: NEGATIVE
COLOR UR AUTO: YELLOW
GLUCOSE UR STRIP-MCNC: NEGATIVE MG/DL
HGB UR QL STRIP: NEGATIVE
HOLD SPECIMEN: NORMAL
KETONES UR STRIP-MCNC: NEGATIVE MG/DL
LEUKOCYTE ESTERASE UR QL STRIP: ABNORMAL
NITRATE UR QL: POSITIVE
PH UR STRIP: 6 [PH] (ref 5–7)
RBC #/AREA URNS AUTO: ABNORMAL /HPF
SP GR UR STRIP: 1.01 (ref 1–1.03)
SQUAMOUS #/AREA URNS AUTO: ABNORMAL /LPF
UROBILINOGEN UR STRIP-ACNC: 0.2 E.U./DL
WBC #/AREA URNS AUTO: ABNORMAL /HPF
YEAST #/AREA URNS HPF: ABNORMAL /HPF

## 2023-08-15 PROCEDURE — 99213 OFFICE O/P EST LOW 20 MIN: CPT | Mod: 25 | Performed by: FAMILY MEDICINE

## 2023-08-15 PROCEDURE — 80053 COMPREHEN METABOLIC PANEL: CPT | Mod: ZL | Performed by: FAMILY MEDICINE

## 2023-08-15 PROCEDURE — 87186 SC STD MICRODIL/AGAR DIL: CPT | Mod: ZL | Performed by: FAMILY MEDICINE

## 2023-08-15 PROCEDURE — G0463 HOSPITAL OUTPT CLINIC VISIT: HCPCS | Performed by: FAMILY MEDICINE

## 2023-08-15 PROCEDURE — 80061 LIPID PANEL: CPT | Mod: ZL | Performed by: FAMILY MEDICINE

## 2023-08-15 PROCEDURE — 36415 COLL VENOUS BLD VENIPUNCTURE: CPT | Mod: ZL | Performed by: FAMILY MEDICINE

## 2023-08-15 PROCEDURE — 84075 ASSAY ALKALINE PHOSPHATASE: CPT | Mod: ZL | Performed by: FAMILY MEDICINE

## 2023-08-15 PROCEDURE — 81001 URINALYSIS AUTO W/SCOPE: CPT | Mod: ZL | Performed by: FAMILY MEDICINE

## 2023-08-15 PROCEDURE — G0438 PPPS, INITIAL VISIT: HCPCS | Performed by: FAMILY MEDICINE

## 2023-08-15 RX ORDER — CLONAZEPAM 0.5 MG/1
0.5 TABLET ORAL 2 TIMES DAILY PRN
Qty: 180 TABLET | Refills: 3 | Status: SHIPPED | OUTPATIENT
Start: 2023-08-15 | End: 2024-03-04

## 2023-08-15 RX ORDER — CEPHALEXIN 500 MG/1
500 CAPSULE ORAL 3 TIMES DAILY
Qty: 21 CAPSULE | Refills: 0 | Status: SHIPPED | OUTPATIENT
Start: 2023-08-15 | End: 2024-04-17

## 2023-08-15 ASSESSMENT — ENCOUNTER SYMPTOMS
NERVOUS/ANXIOUS: 1
DYSURIA: 0
PALPITATIONS: 0
JOINT SWELLING: 1
NAUSEA: 0
FREQUENCY: 1
HEARTBURN: 0
SORE THROAT: 0
HEMATOCHEZIA: 0
EYE PAIN: 0
HEMATURIA: 0
BREAST MASS: 0
ABDOMINAL PAIN: 0
CONSTIPATION: 0
COUGH: 0
HEADACHES: 0
FEVER: 0
MYALGIAS: 0
DIARRHEA: 0
CHILLS: 0
WEAKNESS: 1
PARESTHESIAS: 0
SHORTNESS OF BREATH: 0
ARTHRALGIAS: 1
DIZZINESS: 0

## 2023-08-15 ASSESSMENT — ANXIETY QUESTIONNAIRES
4. TROUBLE RELAXING: NOT AT ALL
1. FEELING NERVOUS, ANXIOUS, OR ON EDGE: NEARLY EVERY DAY
5. BEING SO RESTLESS THAT IT IS HARD TO SIT STILL: NOT AT ALL
GAD7 TOTAL SCORE: 3
3. WORRYING TOO MUCH ABOUT DIFFERENT THINGS: NOT AT ALL
7. FEELING AFRAID AS IF SOMETHING AWFUL MIGHT HAPPEN: NOT AT ALL
2. NOT BEING ABLE TO STOP OR CONTROL WORRYING: NOT AT ALL
IF YOU CHECKED OFF ANY PROBLEMS ON THIS QUESTIONNAIRE, HOW DIFFICULT HAVE THESE PROBLEMS MADE IT FOR YOU TO DO YOUR WORK, TAKE CARE OF THINGS AT HOME, OR GET ALONG WITH OTHER PEOPLE: NOT DIFFICULT AT ALL
GAD7 TOTAL SCORE: 3
6. BECOMING EASILY ANNOYED OR IRRITABLE: NOT AT ALL

## 2023-08-15 ASSESSMENT — ACTIVITIES OF DAILY LIVING (ADL)
CURRENT_FUNCTION: BATHING REQUIRES ASSISTANCE
CURRENT_FUNCTION: HOUSEWORK REQUIRES ASSISTANCE
CURRENT_FUNCTION: TRANSPORTATION REQUIRES ASSISTANCE
CURRENT_FUNCTION: SHOPPING REQUIRES ASSISTANCE

## 2023-08-15 ASSESSMENT — PATIENT HEALTH QUESTIONNAIRE - PHQ9
10. IF YOU CHECKED OFF ANY PROBLEMS, HOW DIFFICULT HAVE THESE PROBLEMS MADE IT FOR YOU TO DO YOUR WORK, TAKE CARE OF THINGS AT HOME, OR GET ALONG WITH OTHER PEOPLE: NOT DIFFICULT AT ALL
SUM OF ALL RESPONSES TO PHQ QUESTIONS 1-9: 1
SUM OF ALL RESPONSES TO PHQ QUESTIONS 1-9: 1

## 2023-08-15 ASSESSMENT — PAIN SCALES - GENERAL: PAINLEVEL: NO PAIN (0)

## 2023-08-15 NOTE — PROGRESS NOTES
"SUBJECTIVE:   Raquel is a 73 year old who presents for Preventive Visit.      Are you in the first 12 months of your Medicare coverage?  No    Healthy Habits:     In general, how would you rate your overall health?  Fair    Frequency of exercise:  None    Do you usually eat at least 4 servings of fruit and vegetables a day, include whole grains    & fiber and avoid regularly eating high fat or \"junk\" foods?  No    Taking medications regularly:  Yes    Medication side effects:  None    Ability to successfully perform activities of daily living:  Transportation requires assistance, shopping requires assistance, housework requires assistance and bathing requires assistance    Home Safety:  No safety concerns identified    Hearing Impairment:  No hearing concerns    In the past 6 months, have you been bothered by leaking of urine? Yes    In general, how would you rate your overall mental or emotional health?  Good    Additional concerns today:  No        Have you ever done Advance Care Planning? (For example, a Health Directive, POLST, or a discussion with a medical provider or your loved ones about your wishes): No, advance care planning information given to patient to review.  Patient declined advance care planning discussion at this time.       Fall risk  Fallen 2 or more times in the past year?: No  Any fall with injury in the past year?: No    Cognitive Screening   1) Repeat 3 items (Leader, Season, Table)    2) Clock draw: ABNORMAL    3) 3 item recall: Recalls 1 object   Results: ABNORMAL clock, 1-2 items recalled: PROBABLE COGNITIVE IMPAIRMENT, **INFORM PROVIDER**    Mini-CogTM Copyright SALINAS Thornton. Licensed by the author for use in [facility]; reprinted with permission ([email or web address]). All rights reserved.      Do you have sleep apnea, excessive snoring or daytime drowsiness? : no    Reviewed and updated as needed this visit by clinical staff   Tobacco  Allergies  Meds              Reviewed and " updated as needed this visit by Provider                 Social History     Tobacco Use    Smoking status: Every Day     Packs/day: 1.00     Years: 55.00     Pack years: 55.00     Types: Cigarettes     Start date: 1/1/1966    Smokeless tobacco: Never    Tobacco comments:     Pt denies quit plan 2/02/2023   Substance Use Topics    Alcohol use: Yes     Alcohol/week: 0.0 standard drinks of alcohol     Comment: occasional             8/15/2023     1:19 PM   Alcohol Use   Prescreen: >3 drinks/day or >7 drinks/week? No     Do you have a current opioid prescription? No  Do you use any other controlled substances or medications that are not prescribed by a provider? None              Current providers sharing in care for this patient include:   Patient Care Team:  Pelon David MD as PCP - General (Family Practice)  Pelon David MD as Assigned PCP  Sera Barros RN as Registered Nurse  Louise Terrell NP as Assigned Surgical Provider  Erin Phelan DPM as Assigned Musculoskeletal Provider    The following health maintenance items are reviewed in Epic and correct as of today:  Health Maintenance   Topic Date Due    DEXA  Never done    MAMMO SCREENING  Never done    TREATMENT AGREEMENT FOR CHRONIC PAIN MANAGEMENT  Never done    COVID-19 Vaccine (1) Never done    Pneumococcal Vaccine: 65+ Years (1 - PCV) Never done    COLORECTAL CANCER SCREENING  Never done    DTAP/TDAP/TD IMMUNIZATION (1 - Tdap) Never done    ZOSTER IMMUNIZATION (1 of 2) Never done    MEDICARE ANNUAL WELLNESS VISIT  Never done    URINE DRUG SCREEN  06/07/2023    LUNG CANCER SCREENING  06/07/2023    LIPID  06/19/2023    INFLUENZA VACCINE (1) 09/01/2023    MANUELA ASSESSMENT  11/15/2023    PHQ-9  11/15/2023    NICOTINE/TOBACCO CESSATION COUNSELING Q 1 YR  08/15/2024    FALL RISK ASSESSMENT  08/15/2024    ADVANCE CARE PLANNING  08/15/2028    HEPATITIS C SCREENING  Completed    DEPRESSION ACTION PLAN  Completed    IPV IMMUNIZATION  Aged Out     MENINGITIS IMMUNIZATION  Aged Out     Lab work is in process    Raquel rios declines all screenings, etc.  Just here so she can get her meds and she is satisfied with her health right now.          Review of Systems   Constitutional:  Negative for chills and fever.   HENT:  Negative for congestion, ear pain, hearing loss and sore throat.    Eyes:  Negative for pain and visual disturbance.   Respiratory:  Negative for cough and shortness of breath.    Cardiovascular:  Positive for peripheral edema. Negative for chest pain and palpitations.   Gastrointestinal:  Negative for abdominal pain, constipation, diarrhea, heartburn, hematochezia and nausea.   Breasts:  Negative for breast mass and discharge.   Genitourinary:  Positive for frequency and urgency. Negative for dysuria, genital sores, hematuria, pelvic pain, vaginal bleeding and vaginal discharge.   Musculoskeletal:  Positive for arthralgias and joint swelling. Negative for myalgias.   Skin:  Negative for rash.   Neurological:  Positive for weakness. Negative for dizziness, headaches and paresthesias.   Psychiatric/Behavioral:  Negative for mood changes. The patient is nervous/anxious.      CONSTITUTIONAL: NEGATIVE for fever, chills, change in weight  INTEGUMENTARY/SKIN: NEGATIVE for worrisome rashes, moles or lesions  EYES: NEGATIVE for vision changes or irritation  ENT/MOUTH: NEGATIVE for ear, mouth and throat problems  RESP: NEGATIVE for significant cough or SOB  BREAST: NEGATIVE for masses, tenderness or discharge  CV: NEGATIVE for chest pain, palpitations or peripheral edema  GI: NEGATIVE for nausea, abdominal pain, heartburn, or change in bowel habits   female: mixed incontinence pattern ongoing.    MUSCULOSKELETAL: NEGATIVE for significant arthralgias or myalgia  NEURO: NEGATIVE for weakness, dizziness or paresthesias  ENDOCRINE: NEGATIVE for temperature intolerance, skin/hair changes  HEME: NEGATIVE for bleeding problems  PSYCHIATRIC: NEGATIVE for  "changes in mood or affect    OBJECTIVE:   /78   Pulse 106   Temp 98.6  F (37  C) (Tympanic)   Wt 52.6 kg (116 lb)   SpO2 97%   BMI 21.22 kg/m   Estimated body mass index is 21.22 kg/m  as calculated from the following:    Height as of 8/18/22: 1.575 m (5' 2\").    Weight as of this encounter: 52.6 kg (116 lb).  Physical Exam  GENERAL: healthy, alert and no distress  EYES: Eyes grossly normal to inspection, PERRL and conjunctivae and sclerae normal  HENT: ear canals and TM's normal, nose and mouth without ulcers or lesions  NECK: no adenopathy, no asymmetry, masses, or scars and thyroid normal to palpation  RESP: lungs clear to auscultation - no rales, rhonchi or wheezes  CV: regular rate and rhythm, normal S1 S2, no S3 or S4, no murmur, click or rub, no peripheral edema and peripheral pulses strong  ABDOMEN: soft, nontender, no hepatosplenomegaly, no masses and bowel sounds normal  MS: no gross musculoskeletal defects noted, no edema  SKIN: no suspicious lesions or rashes  NEURO: Normal strength and tone, mentation intact and speech normal  PSYCH: mentation appears normal, affect normal/bright    Diagnostic Test Results:  Labs reviewed in Epic    ASSESSMENT / PLAN:       ICD-10-CM    1. Encounter for Medicare annual wellness exam  Z00.00       2. Asymptomatic menopausal state  Z78.0       3. Encounter for screening mammogram for malignant neoplasm of breast  Z12.31       4. Hyperlipidemia, unspecified hyperlipidemia type  E78.5 Comprehensive metabolic panel     Lipid Profile     Comprehensive metabolic panel     Lipid Profile      5. Anxiety  F41.9 clonazePAM (KLONOPIN) 0.5 MG tablet      6. Primary hypertension  I10       7. Urinary incontinence, unspecified type  R32 Miscellaneous Order for DME - ONLY FOR DME     UA Macroscopic with reflex to Microscopic and Culture     UA Macroscopic with reflex to Microscopic and Culture                COUNSELING:  Reviewed preventive health counseling, as reflected in " patient instructions        She reports that she has been smoking cigarettes. She started smoking about 57 years ago. She has a 55.00 pack-year smoking history. She has never used smokeless tobacco.  Nicotine/Tobacco Cessation Plan:   Information offered: Patient not interested at this time      Appropriate preventive services were discussed with this patient, including applicable screening as appropriate for cardiovascular disease, diabetes, osteopenia/osteoporosis, and glaucoma.  As appropriate for age/gender, discussed screening for colorectal cancer, prostate cancer, breast cancer, and cervical cancer. Checklist reviewing preventive services available has been given to the patient.    Reviewed patients plan of care and provided an AVS. The Basic Care Plan (routine screening as documented in Health Maintenance) for Raquel meets the Care Plan requirement. This Care Plan has been established and reviewed with the Patient.          Pelon David MD  [facility]

## 2023-08-15 NOTE — TELEPHONE ENCOUNTER
4:46 PM    Reason for Call: Phone Call    Description: calling for blood/urine results    Was an appointment offered for this call? No  If yes : Appointment type              Date    Preferred method for responding to this message: Telephone Call  What is your phone number ?[phone]     If we cannot reach you directly, may we leave a detailed response at the number you provided? Yes    Can this message wait until your PCP/provider returns, if available today? Not applicable    Geraldine Cash

## 2023-08-15 NOTE — PATIENT INSTRUCTIONS
Patient Education   Personalized Prevention Plan  You are due for the preventive services outlined below.  Your care team is available to assist you in scheduling these services.  If you have already completed any of these items, please share that information with your care team to update in your medical record.  Health Maintenance Due   Topic Date Due     Osteoporosis Screening  Never done     Mammogram  Never done     TREATMENT AGREEMENT FOR CHRONIC PAIN MANAGEMENT  Never done     COVID-19 Vaccine (1) Never done     Pneumococcal Vaccine (1 - PCV) Never done     Colorectal Cancer Screening  Never done     Diptheria Tetanus Pertussis (DTAP/TDAP/TD) Vaccine (1 - Tdap) Never done     Zoster (Shingles) Vaccine (1 of 2) Never done     Annual Wellness Visit  Never done     URINE DRUG SCREEN  06/07/2023     LUNG CANCER SCREENING  06/07/2023     Cholesterol Lab  06/19/2023     FALL RISK ASSESSMENT  08/18/2023     Your Health Risk Assessment indicates you feel you are not in good health    A healthy lifestyle helps keep the body fit and the mind alert. It helps protect you from disease, helps you fight disease, and helps prevent chronic disease (disease that doesn't go away) from getting worse. This is important as you get older and begin to notice twinges in muscles and joints and a decline in the strength and stamina you once took for granted. A healthy lifestyle includes good healthcare, good nutrition, weight control, recreation, and regular exercise. Avoid harmful substances and do what you can to keep safe. Another part of a healthy lifestyle is stay mentally active and socially involved.    Good healthcare   Have a wellness visit every year.   If you have new symptoms, let us know right away. Don't wait until the next checkup.   Take medicines exactly as prescribed and keep your medicines in a safe place. Tell us if your medicine causes problems.   Healthy diet and weight control   Eat 3 or 4 small, nutritious,  "low-fat, high-fiber meals a day. Include a variety of fruits, vegetables, and whole-grain foods.   Make sure you get enough calcium in your diet. Calcium, vitamin D, and exercise help prevent osteoporosis (bone thinning).   If you live alone, try eating with others when you can. That way you get a good meal and have company while you eat it.   Try to keep a healthy weight. If you eat more calories than your body uses for energy, it will be stored as fat and you will gain weight.     Recreation   Recreation is not limited to sports and team events. It includes any activity that provides relaxation, interest, enjoyment, and exercise. Recreation provides an outlet for physical, mental, and social energy. It can give a sense of worth and achievement. It can help you stay healthy.    Mental Exercise and Social Involvement  Mental and emotional health is as important as physical health. Keep in touch with friends and family. Stay as active as possible. Continue to learn and challenge yourself.   Things you can do to stay mentally active are:  Learn something new, like a foreign language or musical instrument.   Play SCRABBLE or do crossword puzzles. If you cannot find people to play these games with you at home, you can play them with others on your computer through the Internet.   Join a games club--anything from card games to chess or checkers or lawn bowling.   Start a new hobby.   Go back to school.   Volunteer.   Read.   Keep up with world events.  Learning About Being Physically Active  What is physical activity?     Being physically active means doing any kind of activity that gets your body moving.  The types of physical activity that can help you get fit and stay healthy include:  Aerobic or \"cardio\" activities. These make your heart beat faster and make you breathe harder, such as brisk walking, riding a bike, or running. They strengthen your heart and lungs and build up your endurance.  Strength training " "activities. These make your muscles work against, or \"resist,\" something. Examples include lifting weights or doing push-ups. These activities help tone and strengthen your muscles and bones.  Stretches. These let you move your joints and muscles through their full range of motion. Stretching helps you be more flexible.  Reaching a balance between these three types of physical activity is important because each one contributes to your overall fitness.  What are the benefits of being active?  Being active is one of the best things you can do for your health. It helps you to:  Feel stronger and have more energy to do all the things you like to do.  Focus better at school or work.  Feel, think, and sleep better.  Reach and stay at a healthy weight.  Lose fat and build lean muscle.  Lower your risk for serious health problems, including diabetes, heart attack, high blood pressure, and some cancers.  Keep your heart, lungs, bones, muscles, and joints strong and healthy.  How can you make being active part of your life?  Start slowly. Make it your long-term goal to get at least 30 minutes of exercise on most days of the week. Walking is a good choice. You also may want to do other activities, such as running, swimming, cycling, or playing tennis or team sports.  Pick activities that you like--ones that make your heart beat faster, your muscles stronger, and your muscles and joints more flexible. If you find more than one thing you like doing, do them all. You don't have to do the same thing every day.  Get your heart pumping every day. Any activity that makes your heart beat faster and keeps it at that rate for a while counts.  Here are some great ways to get your heart beating faster:  Go for a brisk walk, run, or bike ride.  Go for a hike or swim.  Go in-line skating.  Play a game of touch football, basketball, or soccer.  Ride a bike.  Play tennis or racquetball.  Climb stairs.  Even some household chores can be " "aerobic--just do them at a faster pace. Vacuuming, raking or mowing the lawn, sweeping the garage, and washing and waxing the car all can help get your heart rate up.  Strengthen your muscles during the week. You don't have to lift heavy weights or grow big, bulky muscles to get stronger. Doing a few simple activities that make your muscles work against, or \"resist,\" something can help you get stronger.  For example, you can:  Do push-ups or sit-ups, which use your own body weight as resistance.  Lift weights or dumbbells or use stretch bands at home or in a gym or community center.  Stretch your muscles often. Stretching will help you as you become more active. It can help you stay flexible, loosen tight muscles, and avoid injury. It can also help improve your balance and posture and can be a great way to relax.  Be sure to stretch the muscles you'll be using when you work out. It's best to warm your muscles slightly before you stretch them. Walk or do some other light aerobic activity for a few minutes, and then start stretching.  When you stretch your muscles:  Do it slowly. Stretching is not about going fast or making sudden movements.  Don't push or bounce during a stretch.  Hold each stretch for at least 15 to 30 seconds, if you can. You should feel a stretch in the muscle, but not pain.  Breathe out as you do the stretch. Then breathe in as you hold the stretch. Don't hold your breath.  If you're worried about how more activity might affect your health, have a checkup before you start. Follow any special advice your doctor gives you for getting a smart start.  Where can you learn more?  Go to https://www.[employer].net/patiented  Enter W332 in the search box to learn more about \"Learning About Being Physically Active.\"  Current as of: October 10, 2022               Content Version: 13.7    2056-2109 "[employer].   Care instructions adapted under license by your healthcare professional. If you " have questions about a medical condition or this instruction, always ask your healthcare professional. [employer] Incorporated disclaims any warranty or liability for your use of this information.      Learning About Dietary Guidelines  What are the Dietary Guidelines for Americans?     Dietary Guidelines for Americans provide tips for eating well and staying healthy. This helps reduce the risk for long-term (chronic) diseases.  These guidelines recommend that you:  Eat and drink the right amount for you. The U.S. government's food guide is called MyPlate. It can help you make your own well-balanced eating plan.  Try to balance your eating with your activity. This helps you stay at a healthy weight.  Drink alcohol in moderation, if at all.  Limit foods high in salt, saturated fat, trans fat, and added sugar.  These guidelines are from the U.S. Department of Agriculture and the U.S. Department of Health and Human Services. They are updated every 5 years.  What is MyPlate?  MyPlate is the U.S. government's food guide. It can help you make your own well-balanced eating plan. A balanced eating plan means that you eat enough, but not too much, and that your food gives you the nutrients you need to stay healthy.  MyPlate focuses on eating plenty of whole grains, fruits, and vegetables, and on limiting fat and sugar. It is available online at www.ChooseMyPlate.gov.  How can you get started?  If you're trying to eat healthier, you can slowly change your eating habits over time. You don't have to make big changes all at once. Start by adding one or two healthy foods to your meals each day.  Grains  Choose whole-grain breads and cereals and whole-wheat pasta and whole-grain crackers.  Vegetables  Eat a variety of vegetables every day. They have lots of nutrients and are part of a heart-healthy diet.  Fruits  Eat a variety of fruits every day. Fruits contain lots of nutrients. Choose fresh fruit instead of fruit  "juice.  Protein foods  Choose fish and lean poultry more often. Eat red meat and fried meats less often. Dried beans, tofu, and nuts are also good sources of protein.  Dairy  Choose low-fat or fat-free products from this food group. If you have problems digesting milk, try eating cheese or yogurt instead.  Fats and oils  Limit fats and oils if you're trying to cut calories. Choose healthy fats when you cook. These include canola oil and olive oil.  Where can you learn more?  Go to https://www.[employer].net/patiented  Enter D676 in the search box to learn more about \"Learning About Dietary Guidelines.\"  Current as of: March 1, 2023               Content Version: 13.7 2006-2023 [employer].   Care instructions adapted under license by your healthcare professional. If you have questions about a medical condition or this instruction, always ask your healthcare professional. [employer] disclaims any warranty or liability for your use of this information.      Activities of Daily Living    Your Health Risk Assessment indicates you have difficulties with activities of daily living such as housework, bathing, preparing meals, taking medication, etc. Please make a follow up appointment for us to address this issue in more detail.  Bladder Training: Care Instructions  Your Care Instructions     Bladder training is used to treat urge incontinence and stress incontinence. Urge incontinence means that the need to urinate comes on so fast that you can't get to a toilet in time. Stress incontinence means that you leak urine because of pressure on your bladder. For example, it may happen when you laugh, cough, or lift something heavy.  Bladder training can increase how long you can wait before you have to urinate. It can also help your bladder hold more urine. And it can give you better control over the urge to urinate.  It is important to remember that bladder training takes a few weeks to a few " months to make a difference. You may not see results right away, but don't give up.  Follow-up care is a key part of your treatment and safety. Be sure to make and go to all appointments, and call your doctor if you are having problems. It's also a good idea to know your test results and keep a list of the medicines you take.  How can you care for yourself at home?  Work with your doctor to come up with a bladder training program that is right for you. You may use one or more of the following methods.  Delayed urination  In the beginning, try to keep from urinating for 5 minutes after you first feel the need to go.  While you wait, take deep, slow breaths to relax. Kegel exercises can also help you delay the need to go to the bathroom.  After some practice, when you can easily wait 5 minutes to urinate, try to wait 10 minutes before you urinate.  Slowly increase the waiting period until you are able to control when you have to urinate.  Scheduled urination  Empty your bladder when you first wake up in the morning.  Schedule times throughout the day when you will urinate.  Start by going to the bathroom every hour, even if you don't need to go.  Slowly increase the time between trips to the bathroom.  When you have found a schedule that works well for you, keep doing it.  If you wake up during the night and have to urinate, do it. Apply your schedule to waking hours only.  Kegel exercises  These tighten and strengthen pelvic muscles, which can help you control the flow of urine. (If doing these exercises causes pain, stop doing them and talk with your doctor.) To do Kegel exercises:  Squeeze your muscles as if you were trying not to pass gas. Or squeeze your muscles as if you were stopping the flow of urine. Your belly, legs, and buttocks shouldn't move.  Hold the squeeze for 3 seconds, then relax for 5 to 10 seconds.  Start with 3 seconds, then add 1 second each week until you are able to squeeze for 10  "seconds.  Repeat the exercise 10 times a session. Do 3 to 8 sessions a day.  When should you call for help?  Watch closely for changes in your health, and be sure to contact your doctor if:    Your incontinence is getting worse.     You do not get better as expected.   Where can you learn more?  Go to https://www.[employer].net/patiented  Enter V684 in the search box to learn more about \"Bladder Training: Care Instructions.\"  Current as of: March 1, 2023               Content Version: 13.7    6175-5661 [employer].   Care instructions adapted under license by your healthcare professional. If you have questions about a medical condition or this instruction, always ask your healthcare professional. [employer] disclaims any warranty or liability for your use of this information.         "

## 2023-08-16 DIAGNOSIS — R74.8 ELEVATED ALKALINE PHOSPHATASE LEVEL: Primary | ICD-10-CM

## 2023-08-16 LAB
ALBUMIN SERPL BCG-MCNC: 3.4 G/DL (ref 3.5–5.2)
ALP SERPL-CCNC: 1027 U/L (ref 35–104)
ALT SERPL W P-5'-P-CCNC: 34 U/L (ref 0–50)
ANION GAP SERPL CALCULATED.3IONS-SCNC: 15 MMOL/L (ref 7–15)
AST SERPL W P-5'-P-CCNC: 50 U/L (ref 0–45)
BILIRUB SERPL-MCNC: 0.7 MG/DL
BUN SERPL-MCNC: 6.3 MG/DL (ref 8–23)
CALCIUM SERPL-MCNC: 9 MG/DL (ref 8.8–10.2)
CHLORIDE SERPL-SCNC: 101 MMOL/L (ref 98–107)
CHOLEST SERPL-MCNC: 164 MG/DL
CREAT SERPL-MCNC: 0.57 MG/DL (ref 0.51–0.95)
DEPRECATED HCO3 PLAS-SCNC: 20 MMOL/L (ref 22–29)
GFR SERPL CREATININE-BSD FRML MDRD: >90 ML/MIN/1.73M2
GLUCOSE SERPL-MCNC: 99 MG/DL (ref 70–99)
HDLC SERPL-MCNC: 55 MG/DL
LDLC SERPL CALC-MCNC: 75 MG/DL
NONHDLC SERPL-MCNC: 109 MG/DL
POTASSIUM SERPL-SCNC: 4.4 MMOL/L (ref 3.4–5.3)
PROT SERPL-MCNC: 7.4 G/DL (ref 6.4–8.3)
SODIUM SERPL-SCNC: 136 MMOL/L (ref 136–145)
TRIGL SERPL-MCNC: 170 MG/DL

## 2023-08-17 LAB — BACTERIA UR CULT: ABNORMAL

## 2023-08-20 LAB
ALP BONE SERPL-CCNC: 184 U/L
ALP LIVER SERPL-CCNC: 786 U/L
ALP OTHER SERPL-CCNC: 0 U/L
ALP SERPL-CCNC: 970 U/L

## 2023-08-21 DIAGNOSIS — R74.8 ELEVATED ALKALINE PHOSPHATASE LEVEL: Primary | ICD-10-CM

## 2023-08-30 ENCOUNTER — TELEPHONE (OUTPATIENT)
Dept: FAMILY MEDICINE | Facility: OTHER | Age: 73
End: 2023-08-30

## 2023-08-30 ENCOUNTER — HOSPITAL ENCOUNTER (OUTPATIENT)
Dept: ULTRASOUND IMAGING | Facility: HOSPITAL | Age: 73
Discharge: HOME | End: 2023-08-30
Attending: FAMILY MEDICINE | Admitting: FAMILY MEDICINE
Payer: MEDICARE

## 2023-08-30 DIAGNOSIS — R74.8 ELEVATED ALKALINE PHOSPHATASE LEVEL: ICD-10-CM

## 2023-08-30 DIAGNOSIS — R93.5 ABNORMAL ULTRASOUND OF ABDOMEN: Primary | ICD-10-CM

## 2023-08-30 PROCEDURE — 76705 ECHO EXAM OF ABDOMEN: CPT

## 2023-09-01 ENCOUNTER — HOSPITAL ENCOUNTER (OUTPATIENT)
Dept: CT IMAGING | Facility: HOSPITAL | Age: 73
Discharge: HOME | End: 2023-09-01
Attending: FAMILY MEDICINE | Admitting: FAMILY MEDICINE
Payer: MEDICARE

## 2023-09-01 DIAGNOSIS — R93.5 ABNORMAL ULTRASOUND OF ABDOMEN: ICD-10-CM

## 2023-09-01 PROCEDURE — G1010 CDSM STANSON: HCPCS

## 2023-09-01 PROCEDURE — 74177 CT ABD & PELVIS W/CONTRAST: CPT | Mod: MG

## 2023-09-01 PROCEDURE — 250N000011 HC RX IP 250 OP 636: Performed by: FAMILY MEDICINE

## 2023-09-01 RX ORDER — IOPAMIDOL 755 MG/ML
57 INJECTION, SOLUTION INTRAVASCULAR ONCE
Status: DISCONTINUED | OUTPATIENT
Start: 2023-09-01 | End: 2023-09-01

## 2023-09-01 RX ORDER — IOPAMIDOL 755 MG/ML
75 INJECTION, SOLUTION INTRAVASCULAR ONCE
Status: COMPLETED | OUTPATIENT
Start: 2023-09-01 | End: 2023-09-01

## 2023-09-01 RX ADMIN — IOPAMIDOL 75 ML: 755 INJECTION, SOLUTION INTRAVENOUS at 12:46

## 2023-09-05 ENCOUNTER — OFFICE VISIT (OUTPATIENT)
Dept: FAMILY MEDICINE | Facility: OTHER | Age: 73
End: 2023-09-05
Attending: FAMILY MEDICINE
Payer: MEDICARE

## 2023-09-05 VITALS
TEMPERATURE: 97.4 F | WEIGHT: 124 LBS | BODY MASS INDEX: 22.68 KG/M2 | HEART RATE: 103 BPM | OXYGEN SATURATION: 96 % | DIASTOLIC BLOOD PRESSURE: 86 MMHG | SYSTOLIC BLOOD PRESSURE: 152 MMHG

## 2023-09-05 DIAGNOSIS — K83.8 DILATION OF BILIARY TRACT: ICD-10-CM

## 2023-09-05 DIAGNOSIS — K86.89 PANCREATIC MASS: Primary | ICD-10-CM

## 2023-09-05 PROCEDURE — G0463 HOSPITAL OUTPT CLINIC VISIT: HCPCS

## 2023-09-05 PROCEDURE — 99214 OFFICE O/P EST MOD 30 MIN: CPT | Performed by: FAMILY MEDICINE

## 2023-09-05 ASSESSMENT — PAIN SCALES - GENERAL: PAINLEVEL: NO PAIN (0)

## 2023-09-05 NOTE — PROGRESS NOTES
Assessment & Plan     Pancreatic mass  Lengthy review with her and Jess.  Concern for malignancy obviously but uncertain.  Not sure how to get this biopsied if needed, but with the biliary dilation going to start with GI who I assume might consider EUS with biopsy or other.  Either way, Jess and Raquel full aware and I sent the referral to [facility] in [place].    - Adult GI  Referral - Consult Only; Future  - Adult GI  Referral - Consult Only; Future    Dilation of biliary tract  As above.    - Adult GI  Referral - Consult Only; Future  - Adult GI  Referral - Consult Only; Future      30 minutes spent by me on the date of the encounter doing chart review, review of test results, interpretation of tests, patient visit, and documentation            No follow-ups on file.    Pelon David MD  [facility]   Raquel is a 73 year old, presenting for the following health issues:  Results      HPI     Pt is here with her daughter Jess to discuss her CT abdomen/pelvis done on 9/1/23.          Review of Systems   Constitutional, HEENT, cardiovascular, pulmonary, gi and gu systems are negative, except as otherwise noted.      Objective    BP (!) 152/86   Pulse 103   Temp 97.4  F (36.3  C) (Tympanic)   Wt 56.2 kg (124 lb)   SpO2 96%   BMI 22.68 kg/m    Body mass index is 22.68 kg/m .  Physical Exam   GENERAL: healthy, alert and no distress  MS: no gross musculoskeletal defects noted, no edema  PSYCH: mentation appears normal, affect normal/bright

## 2023-09-25 ENCOUNTER — MYC MEDICAL ADVICE (OUTPATIENT)
Dept: FAMILY MEDICINE | Facility: OTHER | Age: 73
End: 2023-09-25

## 2023-09-26 ENCOUNTER — TELEPHONE (OUTPATIENT)
Dept: FAMILY MEDICINE | Facility: OTHER | Age: 73
End: 2023-09-26

## 2023-09-26 NOTE — TELEPHONE ENCOUNTER
3:03 PM    Reason for Call: OVERBOOK    Patient is having the following symptoms: follow up after surgery dos 09/22    The patient is requesting an appointment for 5 days after surgery with Dr David.    Was an appointment offered for this call? No  If yes : Appointment type              Date    Preferred method for responding to this message: Telephone Call  What is your phone number ?[phone]    If we cannot reach you directly, may we leave a detailed response at the number you provided? Yes    Can this message wait until your PCP/provider returns, if unavailable today? Not applicable    Geraldine Cash

## 2023-09-27 NOTE — COMMUNITY RESOURCES LIST (ENGLISH)
09/27/2023    [facility] [place] [facility]  N/A  For questions about this resource list or additional care needs, please contact your primary care clinic or care manager.  Phone: [phone]   Email: N/A   Address: [address] [place] [id number]   Hours: N/A        Financial Stability       Utility payment assistance  1  [employer] [facility] Main Office - Energy Assistance Program Distance: 19.66 miles      Phone/Virtual   [address] [facility] [address] [place] [address]  Language: English  Hours: Mon - Thu 8:00 AM - 4:30 PM , Fri 8:00 AM - 12:00 PM  Fees: Free   Phone: (294) [phone] Email: howard@[employer] Website: http://www.[employer]          Important Numbers & Websites       Emergency Services   911  City Services   311  Poison Control   (467) 443-7650  Suicide Prevention Lifeline   (817) 234-3561 (TALK)  Child Abuse Hotline   (743) 149-7172 (4-A-Child)  Sexual Assault Hotline   (999) 395-5231 (HOPE)  National Runaway Safeline   (522) 448-2581 (RUNAWAY)  All-Options Talkline   (258) 243-3680  Substance Abuse Referral   (527) 550-7653 (HELP)

## 2023-09-28 ENCOUNTER — OFFICE VISIT (OUTPATIENT)
Dept: FAMILY MEDICINE | Facility: OTHER | Age: 73
End: 2023-09-28
Attending: FAMILY MEDICINE
Payer: MEDICARE

## 2023-09-28 VITALS
HEART RATE: 98 BPM | OXYGEN SATURATION: 98 % | TEMPERATURE: 98.4 F | DIASTOLIC BLOOD PRESSURE: 70 MMHG | BODY MASS INDEX: 22.31 KG/M2 | SYSTOLIC BLOOD PRESSURE: 134 MMHG | WEIGHT: 122 LBS

## 2023-09-28 DIAGNOSIS — K83.1 BILIARY OBSTRUCTION (H): Primary | ICD-10-CM

## 2023-09-28 DIAGNOSIS — K86.89 PANCREATIC MASS: ICD-10-CM

## 2023-09-28 DIAGNOSIS — K83.8 DILATION OF BILIARY TRACT: ICD-10-CM

## 2023-09-28 PROCEDURE — 99496 TRANSJ CARE MGMT HIGH F2F 7D: CPT | Performed by: FAMILY MEDICINE

## 2023-09-28 PROCEDURE — 99213 OFFICE O/P EST LOW 20 MIN: CPT | Performed by: FAMILY MEDICINE

## 2023-09-28 PROCEDURE — G0463 HOSPITAL OUTPT CLINIC VISIT: HCPCS | Performed by: FAMILY MEDICINE

## 2023-09-28 PROCEDURE — 82040 ASSAY OF SERUM ALBUMIN: CPT | Mod: ZL | Performed by: FAMILY MEDICINE

## 2023-09-28 PROCEDURE — 36415 COLL VENOUS BLD VENIPUNCTURE: CPT | Mod: ZL | Performed by: FAMILY MEDICINE

## 2023-09-28 ASSESSMENT — PAIN SCALES - GENERAL: PAINLEVEL: MODERATE PAIN (5)

## 2023-09-28 NOTE — PROGRESS NOTES
Assessment & Plan     Biliary obstruction  S/p MRCP and stent.  Update labs.  Still elevated.    - Hepatic panel (Albumin, ALT, AST, Bili, Alk Phos, TP); Future    Dilation of biliary tract  As above.    - Hepatic panel (Albumin, ALT, AST, Bili, Alk Phos, TP); Future    Pancreatic mass  Reviewed.  No evidence of mass on the MRCP.  Follow.    - Hepatic panel (Albumin, ALT, AST, Bili, Alk Phos, TP); Future                 No follow-ups on file.    Pelon David MD  [facility]    Juliano García is a 73 year old, presenting for the following health issues:  RECHECK      [facility]       Hospital Follow-up Visit:    Hospital/Nursing Home/ Rehab Facility:  [facility]   Date of Admission: 9/21/23  Date of Discharge: 9/24/23  Reason(s) for Admission: biliary obstruction     Was your hospitalization related to COVID-19? No   Problems taking medications regularly:  None  Medication changes since discharge: None  Problems adhering to non-medication therapy:  None    Summary of hospitalization:  CareEverywhere information obtained and reviewed  Diagnostic Tests/Treatments reviewed.  Follow up needed: none  Other Healthcare Providers Involved in Patient s Care:         None  Update since discharge: improved.         Plan of care communicated with patient                   Review of Systems   Constitutional, HEENT, cardiovascular, pulmonary, gi and gu systems are negative, except as otherwise noted.      Objective    /70   Pulse 98   Temp 98.4  F (36.9  C) (Tympanic)   Wt 55.3 kg (122 lb)   SpO2 98%   BMI 22.31 kg/m    Body mass index is 22.31 kg/m .  Physical Exam   GENERAL: healthy, alert and no distress  NECK: no adenopathy, no asymmetry, masses, or scars and thyroid normal to palpation  RESP: lungs clear to auscultation - no rales, rhonchi or wheezes  CV: regular rate and rhythm, normal S1 S2, no S3 or S4, no murmur, click or rub, no peripheral edema and peripheral pulses  strong  ABDOMEN: soft, nontender, no hepatosplenomegaly, no masses and bowel sounds normal  MS: no gross musculoskeletal defects noted, no edema

## 2023-09-29 LAB
ALBUMIN SERPL BCG-MCNC: 3.5 G/DL (ref 3.5–5.2)
ALP SERPL-CCNC: 673 U/L (ref 35–104)
ALT SERPL W P-5'-P-CCNC: 29 U/L (ref 0–50)
AST SERPL W P-5'-P-CCNC: 41 U/L (ref 0–45)
BILIRUB DIRECT SERPL-MCNC: 0.32 MG/DL (ref 0–0.3)
BILIRUB SERPL-MCNC: 0.7 MG/DL
PROT SERPL-MCNC: 7.2 G/DL (ref 6.4–8.3)

## 2023-10-06 ENCOUNTER — DOCUMENTATION ONLY (OUTPATIENT)
Dept: OTHER | Facility: CLINIC | Age: 73
End: 2023-10-06
Payer: MEDICARE

## 2023-10-19 DIAGNOSIS — G89.29 CHRONIC MIDLINE LOW BACK PAIN WITHOUT SCIATICA: ICD-10-CM

## 2023-10-19 DIAGNOSIS — M54.50 CHRONIC MIDLINE LOW BACK PAIN WITHOUT SCIATICA: ICD-10-CM

## 2023-10-19 DIAGNOSIS — M79.672 LEFT FOOT PAIN: ICD-10-CM

## 2023-10-19 RX ORDER — NABUMETONE 500 MG/1
TABLET, FILM COATED ORAL
Qty: 180 TABLET | Refills: 3 | Status: SHIPPED | OUTPATIENT
Start: 2023-10-19

## 2023-12-18 DIAGNOSIS — R21 RASH: ICD-10-CM

## 2023-12-18 NOTE — TELEPHONE ENCOUNTER
Hydroxyzine      Last Written Prescription Date:  11/22/22  Last Fill Quantity: 120,   # refills: 3  Last Office Visit: 9/28/23  Future Office visit:       Routing refill request to provider for review/approval because:

## 2023-12-20 RX ORDER — HYDROXYZINE PAMOATE 25 MG/1
CAPSULE ORAL
Qty: 120 CAPSULE | Refills: 4 | Status: SHIPPED | OUTPATIENT
Start: 2023-12-20 | End: 2024-08-13

## 2023-12-29 DIAGNOSIS — E78.5 HYPERLIPIDEMIA, UNSPECIFIED HYPERLIPIDEMIA TYPE: ICD-10-CM

## 2023-12-29 NOTE — TELEPHONE ENCOUNTER
Disp Refills Start End JONATHAN   atorvastatin (LIPITOR) 40 MG tablet 90 tablet 1 4/20/2023 -- No     Last Office Visit: 09/28/2023  Future Office visit:       Routing refill request to provider for review/approval because:

## 2024-01-02 RX ORDER — ATORVASTATIN CALCIUM 40 MG/1
TABLET, FILM COATED ORAL
Qty: 90 TABLET | Refills: 3 | Status: SHIPPED | OUTPATIENT
Start: 2024-01-02

## 2024-01-10 ENCOUNTER — MEDICAL CORRESPONDENCE (OUTPATIENT)
Dept: HEALTH INFORMATION MANAGEMENT | Facility: CLINIC | Age: 74
End: 2024-01-10

## 2024-04-09 DIAGNOSIS — L30.9 DERMATITIS: ICD-10-CM

## 2024-04-10 RX ORDER — TRIAMCINOLONE ACETONIDE 5 MG/G
CREAM TOPICAL
Qty: 30 G | Refills: 1 | Status: SHIPPED | OUTPATIENT
Start: 2024-04-10

## 2024-04-14 DIAGNOSIS — E87.6 HYPOKALEMIA: ICD-10-CM

## 2024-04-15 RX ORDER — POTASSIUM CHLORIDE 1500 MG/1
20 TABLET, EXTENDED RELEASE ORAL 2 TIMES DAILY
Qty: 180 TABLET | Refills: 1 | Status: SHIPPED | OUTPATIENT
Start: 2024-04-15

## 2024-04-16 NOTE — PROGRESS NOTES
Assessment & Plan     Dermatitis  Diffuse.  Consider a contact, but also consider fungal.  It is diffuse over the entire buttocks into the groin with excoriations.  Consider a reaction to her incontinence pads as well.  Trying steroid and antifungal orally, as too much surface area to cover practically.  Close followup and Jess very much involved.    - predniSONE (DELTASONE) 20 MG tablet; Take 1 tablet (20 mg) by mouth 2 times daily for 5 days  - fluconazole (DIFLUCAN) 100 MG tablet; Take 1 tablet (100 mg) by mouth daily for 15 days  - Comprehensive metabolic panel (BMP + Alb, Alk Phos, ALT, AST, Total. Bili, TP); Future  - CBC with platelets and differential; Future  - TSH with free T4 reflex; Future  - Comprehensive metabolic panel (BMP + Alb, Alk Phos, ALT, AST, Total. Bili, TP)  - CBC with platelets and differential  - TSH with free T4 reflex    Tinea cruris  Presumed.  Again, as above.  Using both orals.  Reviewed her EKG without prolonged QT, last in 2022.  I didn't repeat but did use the fluconazole.    - predniSONE (DELTASONE) 20 MG tablet; Take 1 tablet (20 mg) by mouth 2 times daily for 5 days  - fluconazole (DIFLUCAN) 100 MG tablet; Take 1 tablet (100 mg) by mouth daily for 15 days  - Comprehensive metabolic panel (BMP + Alb, Alk Phos, ALT, AST, Total. Bili, TP); Future  - CBC with platelets and differential; Future  - TSH with free T4 reflex; Future  - Comprehensive metabolic panel (BMP + Alb, Alk Phos, ALT, AST, Total. Bili, TP)  - CBC with platelets and differential  - TSH with free T4 reflex    Shakiness  Diffuse.  Some intention and some resting.  Drinking 2 drinks/day; not more and really drinking less than she has in the past.  Update full labs below and continue to monitor.  Consider neurology.  Nice review.    - Comprehensive metabolic panel (BMP + Alb, Alk Phos, ALT, AST, Total. Bili, TP); Future  - CBC with platelets and differential; Future  - TSH with free T4 reflex; Future  - Comprehensive  metabolic panel (BMP + Alb, Alk Phos, ALT, AST, Total. Bili, TP)  - CBC with platelets and differential  - TSH with free T4 reflex                  No follow-ups on file.    Juliano García is a 74 year old, presenting for the following health issues:  Wound Check        4/17/2024     3:28 PM   Additional Questions   Roomed by Nga JENKINS   Accompanied by daughter     HPI       Derm problem     Duration: 2 weeks   Description (location/character/radiation): buttocks   Intensity:  moderate  Accompanying signs and symptoms: rash, itchy and painful   History (similar episodes/previous evaluation): None  Precipitating or alleviating factors: None  Therapies tried and outcome: OTC diaper rash cream            Review of Systems  Constitutional, HEENT, cardiovascular, pulmonary, gi and gu systems are negative, except as otherwise noted.      Objective    BP (!) 148/82   Pulse 112   Temp 98.3  F (36.8  C) (Tympanic)   Wt 54.4 kg (120 lb)   SpO2 96%   BMI 21.95 kg/m    Body mass index is 21.95 kg/m .  Physical Exam   GENERAL: alert and no distress  NECK: no adenopathy, no asymmetry, masses, or scars  RESP: lungs clear to auscultation - no rales, rhonchi or wheezes  CV: regular rate and rhythm, normal S1 S2, no S3 or S4, no murmur, click or rub, no peripheral edema  ABDOMEN: soft, nontender, no hepatosplenomegaly, no masses and bowel sounds normal  MS: in wheelchair.  Shaky at rest and with any intention.  Tough to say the primary nature of the tremor.    SKIN: diffuse excoriations and dermatitis present on the entire buttocks down into the groin.  Exam standing with Jess present and helping (daughter).      Labs pending as above with cbc stable.          Signed Electronically by: Pelon David MD

## 2024-04-17 ENCOUNTER — OFFICE VISIT (OUTPATIENT)
Dept: FAMILY MEDICINE | Facility: OTHER | Age: 74
End: 2024-04-17
Attending: FAMILY MEDICINE
Payer: MEDICARE

## 2024-04-17 VITALS
HEART RATE: 112 BPM | OXYGEN SATURATION: 96 % | SYSTOLIC BLOOD PRESSURE: 148 MMHG | BODY MASS INDEX: 21.95 KG/M2 | WEIGHT: 120 LBS | TEMPERATURE: 98.3 F | DIASTOLIC BLOOD PRESSURE: 82 MMHG

## 2024-04-17 DIAGNOSIS — R25.1 SHAKINESS: ICD-10-CM

## 2024-04-17 DIAGNOSIS — B35.6 TINEA CRURIS: ICD-10-CM

## 2024-04-17 DIAGNOSIS — L30.9 DERMATITIS: Primary | ICD-10-CM

## 2024-04-17 LAB
BASOPHILS # BLD AUTO: 0 10E3/UL (ref 0–0.2)
BASOPHILS NFR BLD AUTO: 1 %
EOSINOPHIL # BLD AUTO: 0.1 10E3/UL (ref 0–0.7)
EOSINOPHIL NFR BLD AUTO: 2 %
ERYTHROCYTE [DISTWIDTH] IN BLOOD BY AUTOMATED COUNT: 13 % (ref 10–15)
HCT VFR BLD AUTO: 40.2 %
HGB BLD-MCNC: 14.1 G/DL
IMM GRANULOCYTES # BLD: 0 10E3/UL
IMM GRANULOCYTES NFR BLD: 0 %
LYMPHOCYTES # BLD AUTO: 1.7 10E3/UL (ref 0.8–5.3)
LYMPHOCYTES NFR BLD AUTO: 22 %
MCH RBC QN AUTO: 34.1 PG (ref 26.5–33)
MCHC RBC AUTO-ENTMCNC: 35.1 G/DL (ref 31.5–36.5)
MCV RBC AUTO: 97 FL (ref 78–100)
MONOCYTES # BLD AUTO: 0.7 10E3/UL (ref 0–1.3)
MONOCYTES NFR BLD AUTO: 9 %
NEUTROPHILS # BLD AUTO: 5 10E3/UL (ref 1.6–8.3)
NEUTROPHILS NFR BLD AUTO: 67 %
PLATELET # BLD AUTO: 309 10E3/UL (ref 150–450)
RBC # BLD AUTO: 4.13 10E6/UL
WBC # BLD AUTO: 7.5 10E3/UL (ref 4–11)

## 2024-04-17 PROCEDURE — 84443 ASSAY THYROID STIM HORMONE: CPT | Mod: ZL | Performed by: FAMILY MEDICINE

## 2024-04-17 PROCEDURE — 36415 COLL VENOUS BLD VENIPUNCTURE: CPT | Mod: ZL | Performed by: FAMILY MEDICINE

## 2024-04-17 PROCEDURE — 85025 COMPLETE CBC W/AUTO DIFF WBC: CPT | Mod: ZL | Performed by: FAMILY MEDICINE

## 2024-04-17 PROCEDURE — G0463 HOSPITAL OUTPT CLINIC VISIT: HCPCS

## 2024-04-17 PROCEDURE — 99214 OFFICE O/P EST MOD 30 MIN: CPT | Performed by: FAMILY MEDICINE

## 2024-04-17 PROCEDURE — 80053 COMPREHEN METABOLIC PANEL: CPT | Mod: ZL | Performed by: FAMILY MEDICINE

## 2024-04-17 RX ORDER — PREDNISONE 20 MG/1
20 TABLET ORAL 2 TIMES DAILY
Qty: 10 TABLET | Refills: 0 | Status: SHIPPED | OUTPATIENT
Start: 2024-04-17 | End: 2024-04-22

## 2024-04-17 RX ORDER — FLUCONAZOLE 100 MG/1
100 TABLET ORAL DAILY
Qty: 15 TABLET | Refills: 0 | Status: SHIPPED | OUTPATIENT
Start: 2024-04-17 | End: 2024-05-02

## 2024-04-17 ASSESSMENT — PAIN SCALES - GENERAL: PAINLEVEL: EXTREME PAIN (8)

## 2024-04-17 ASSESSMENT — PATIENT HEALTH QUESTIONNAIRE - PHQ9
SUM OF ALL RESPONSES TO PHQ QUESTIONS 1-9: 2
10. IF YOU CHECKED OFF ANY PROBLEMS, HOW DIFFICULT HAVE THESE PROBLEMS MADE IT FOR YOU TO DO YOUR WORK, TAKE CARE OF THINGS AT HOME, OR GET ALONG WITH OTHER PEOPLE: NOT DIFFICULT AT ALL
SUM OF ALL RESPONSES TO PHQ QUESTIONS 1-9: 2

## 2024-04-17 ASSESSMENT — ANXIETY QUESTIONNAIRES
GAD7 TOTAL SCORE: 4
8. IF YOU CHECKED OFF ANY PROBLEMS, HOW DIFFICULT HAVE THESE MADE IT FOR YOU TO DO YOUR WORK, TAKE CARE OF THINGS AT HOME, OR GET ALONG WITH OTHER PEOPLE?: SOMEWHAT DIFFICULT
7. FEELING AFRAID AS IF SOMETHING AWFUL MIGHT HAPPEN: NOT AT ALL
7. FEELING AFRAID AS IF SOMETHING AWFUL MIGHT HAPPEN: NOT AT ALL
6. BECOMING EASILY ANNOYED OR IRRITABLE: NOT AT ALL
5. BEING SO RESTLESS THAT IT IS HARD TO SIT STILL: NOT AT ALL
4. TROUBLE RELAXING: SEVERAL DAYS
1. FEELING NERVOUS, ANXIOUS, OR ON EDGE: NEARLY EVERY DAY
3. WORRYING TOO MUCH ABOUT DIFFERENT THINGS: NOT AT ALL
IF YOU CHECKED OFF ANY PROBLEMS ON THIS QUESTIONNAIRE, HOW DIFFICULT HAVE THESE PROBLEMS MADE IT FOR YOU TO DO YOUR WORK, TAKE CARE OF THINGS AT HOME, OR GET ALONG WITH OTHER PEOPLE: SOMEWHAT DIFFICULT
GAD7 TOTAL SCORE: 4
GAD7 TOTAL SCORE: 4
2. NOT BEING ABLE TO STOP OR CONTROL WORRYING: NOT AT ALL

## 2024-04-18 LAB
ALBUMIN SERPL BCG-MCNC: 3.9 G/DL (ref 3.5–5.2)
ALP SERPL-CCNC: 111 U/L (ref 40–150)
ALT SERPL W P-5'-P-CCNC: 39 U/L (ref 0–50)
ANION GAP SERPL CALCULATED.3IONS-SCNC: 15 MMOL/L (ref 7–15)
AST SERPL W P-5'-P-CCNC: 87 U/L (ref 0–45)
BILIRUB SERPL-MCNC: 0.6 MG/DL
BUN SERPL-MCNC: 5.3 MG/DL (ref 8–23)
CALCIUM SERPL-MCNC: 9 MG/DL (ref 8.8–10.2)
CHLORIDE SERPL-SCNC: 99 MMOL/L (ref 98–107)
CREAT SERPL-MCNC: 0.5 MG/DL (ref 0.51–0.95)
DEPRECATED HCO3 PLAS-SCNC: 23 MMOL/L (ref 22–29)
EGFRCR SERPLBLD CKD-EPI 2021: >90 ML/MIN/1.73M2
GLUCOSE SERPL-MCNC: 132 MG/DL (ref 70–99)
POTASSIUM SERPL-SCNC: 3.9 MMOL/L (ref 3.4–5.3)
PROT SERPL-MCNC: 7.1 G/DL (ref 6.4–8.3)
SODIUM SERPL-SCNC: 137 MMOL/L (ref 135–145)
TSH SERPL DL<=0.005 MIU/L-ACNC: 1.48 UIU/ML (ref 0.3–4.2)

## 2024-04-28 ENCOUNTER — HEALTH MAINTENANCE LETTER (OUTPATIENT)
Age: 74
End: 2024-04-28

## 2024-07-26 DIAGNOSIS — F41.9 ANXIETY: ICD-10-CM

## 2024-07-26 NOTE — TELEPHONE ENCOUNTER
clonazePAM (KLONOPIN) 0.5 MG tablet       Last Written Prescription Date:  5-30-24  Last Fill Quantity: 180,   # refills: 0  Last Office Visit: 4-17-24  Future Office visit:       Routing refill request to provider for review/approval because:  Drug not on the [facility], [facility] or [facility] refill protocol or controlled substance

## 2024-07-29 RX ORDER — CLONAZEPAM 0.5 MG/1
0.5 TABLET ORAL 2 TIMES DAILY PRN
Qty: 180 TABLET | Refills: 0 | Status: SHIPPED | OUTPATIENT
Start: 2024-07-29 | End: 2024-07-30

## 2024-07-30 ENCOUNTER — MYC MEDICAL ADVICE (OUTPATIENT)
Dept: FAMILY MEDICINE | Facility: OTHER | Age: 74
End: 2024-07-30

## 2024-07-30 DIAGNOSIS — F41.9 ANXIETY: ICD-10-CM

## 2024-07-30 RX ORDER — CLONAZEPAM 0.5 MG/1
0.5 TABLET ORAL 2 TIMES DAILY PRN
Qty: 180 TABLET | Refills: 0 | Status: SHIPPED | OUTPATIENT
Start: 2024-07-30

## 2024-07-30 NOTE — PROGRESS NOTES
Assessment & Plan     Itching  Ongoing.  Nice review of risk/benefit of chronic daily steroids.  Not great, but we're going this route for now.  The itch is terrible and quality of life is lacking.    - predniSONE (DELTASONE) 10 MG tablet; Take 1 tablet (10 mg) by mouth daily    Chronic midline low back pain without sciatica  Steroid might help.      Generalized muscle weakness  Script for WC and briefs sent.  She cannot even get up.  Daughter goes there daily to do cares.   - Miscellaneous DME Supply Order (Use only if a more specific DME order does not already exist)  - Miscellaneous DME Supply Order (Use only if a more specific DME order does not already exist)    Moderate protein-calorie malnutrition (H24)  Ongoing.     Unable to walk  Ongoing.   - Miscellaneous DME Supply Order (Use only if a more specific DME order does not already exist)  - Miscellaneous DME Supply Order (Use only if a more specific DME order does not already exist)    Continuous leakage of urine  Ongoing, with stool.  Completely dependent on the briefs.    - Miscellaneous DME Supply Order (Use only if a more specific DME order does not already exist)  - Miscellaneous DME Supply Order (Use only if a more specific DME order does not already exist)    Discussion:  I am recommending assisted living for her.  Jess (daughter) is going to burn out and we can do this now rather than later.  She was receptive but didn't love the discussion.        45 minutes spent with patient and daughter and chart in review, visit, and documentation.      Nicotine/Tobacco Cessation  She reports that she has been smoking cigarettes. She started smoking about 58 years ago. She has a 58.6 pack-year smoking history. She has never used smokeless tobacco.  Nicotine/Tobacco Cessation Plan  Information offered: Patient not interested at this time            No follow-ups on file.    Juliano García is a 74 year old, presenting for the following health  issues:  Generalized Weakness        8/1/2024     9:02 AM   Additional Questions   Roomed by Nga   Accompanied by daughter     EMMETT     Weakness     Duration: 3 months   Description (location/character/radiation): bilateral legs and feet   Intensity:  moderate  Accompanying signs and symptoms: weakness in legs and feet, does not want to get out of chair for fear of falling, rash on buttocks from not getting out of chair   History (similar episodes/previous evaluation): None  Precipitating or alleviating factors: None  Therapies tried and outcome: None             Review of Systems  Constitutional, HEENT, cardiovascular, pulmonary, gi and gu systems are negative, except as otherwise noted.      Objective    BP 94/58   Pulse (!) 124   Temp 98.1  F (36.7  C) (Tympanic)   SpO2 98%   There is no height or weight on file to calculate BMI.  Physical Exam   GENERAL: alert and no distress  NECK: no adenopathy, no asymmetry, masses, or scars  RESP: lungs clear to auscultation - no rales, rhonchi or wheezes  CV: regular rate and rhythm, normal S1 S2, no S3 or S4, no murmur, click or rub, no peripheral edema  ABDOMEN: soft, nontender, no hepatosplenomegaly, no masses and bowel sounds normal  MS: no gross musculoskeletal defects noted, no edema  SKIN: severe plaquing and excoriations on the buttocks consistent with pressure and inflammation presumably from the briefs.      The longitudinal plan of care for the diagnosis(es)/condition(s) as documented were addressed during this visit. Due to the added complexity in care, I will continue to support Raquel in the subsequent management and with ongoing continuity of care.        Signed Electronically by: Pelon David MD

## 2024-07-30 NOTE — TELEPHONE ENCOUNTER
She cannot run out of her klonopin as she will withdraw from that, so we will have to fill it early and consider it lost.      I should see her when we can.  Suggest to Jess she start thinking about assisted living, as a lot of what you describe would often require a change in living environment to a more supervised type situation like assisted living.      Thanks.  Pelon David MD

## 2024-07-31 ASSESSMENT — PATIENT HEALTH QUESTIONNAIRE - PHQ9
10. IF YOU CHECKED OFF ANY PROBLEMS, HOW DIFFICULT HAVE THESE PROBLEMS MADE IT FOR YOU TO DO YOUR WORK, TAKE CARE OF THINGS AT HOME, OR GET ALONG WITH OTHER PEOPLE: SOMEWHAT DIFFICULT
SUM OF ALL RESPONSES TO PHQ QUESTIONS 1-9: 9
SUM OF ALL RESPONSES TO PHQ QUESTIONS 1-9: 9

## 2024-08-01 ENCOUNTER — OFFICE VISIT (OUTPATIENT)
Dept: FAMILY MEDICINE | Facility: OTHER | Age: 74
End: 2024-08-01
Attending: FAMILY MEDICINE
Payer: MEDICARE

## 2024-08-01 VITALS
DIASTOLIC BLOOD PRESSURE: 58 MMHG | TEMPERATURE: 98.1 F | OXYGEN SATURATION: 98 % | SYSTOLIC BLOOD PRESSURE: 94 MMHG | HEART RATE: 124 BPM

## 2024-08-01 DIAGNOSIS — L29.9 ITCHING: Primary | ICD-10-CM

## 2024-08-01 DIAGNOSIS — E44.0 MODERATE PROTEIN-CALORIE MALNUTRITION (H): ICD-10-CM

## 2024-08-01 DIAGNOSIS — G89.29 CHRONIC MIDLINE LOW BACK PAIN WITHOUT SCIATICA: ICD-10-CM

## 2024-08-01 DIAGNOSIS — N39.45 CONTINUOUS LEAKAGE OF URINE: ICD-10-CM

## 2024-08-01 DIAGNOSIS — R26.2 UNABLE TO WALK: ICD-10-CM

## 2024-08-01 DIAGNOSIS — M62.81 GENERALIZED MUSCLE WEAKNESS: ICD-10-CM

## 2024-08-01 DIAGNOSIS — M54.50 CHRONIC MIDLINE LOW BACK PAIN WITHOUT SCIATICA: ICD-10-CM

## 2024-08-01 PROCEDURE — 99215 OFFICE O/P EST HI 40 MIN: CPT | Performed by: FAMILY MEDICINE

## 2024-08-01 PROCEDURE — G0463 HOSPITAL OUTPT CLINIC VISIT: HCPCS

## 2024-08-01 PROCEDURE — G2211 COMPLEX E/M VISIT ADD ON: HCPCS | Performed by: FAMILY MEDICINE

## 2024-08-01 RX ORDER — PREDNISONE 10 MG/1
10 TABLET ORAL DAILY
Qty: 90 TABLET | Refills: 3 | Status: SHIPPED | OUTPATIENT
Start: 2024-08-01

## 2024-08-01 ASSESSMENT — PAIN SCALES - GENERAL: PAINLEVEL: NO PAIN (0)

## 2024-09-15 ENCOUNTER — HEALTH MAINTENANCE LETTER (OUTPATIENT)
Age: 74
End: 2024-09-15

## 2024-09-23 DIAGNOSIS — R21 RASH: ICD-10-CM

## 2024-09-23 RX ORDER — HYDROXYZINE PAMOATE 25 MG/1
25 CAPSULE ORAL 3 TIMES DAILY PRN
Qty: 120 CAPSULE | Refills: 1 | Status: SHIPPED | OUTPATIENT
Start: 2024-09-23

## 2024-10-10 DIAGNOSIS — F41.9 ANXIETY: ICD-10-CM

## 2024-10-10 RX ORDER — CLONAZEPAM 0.5 MG/1
0.5 TABLET ORAL 2 TIMES DAILY PRN
Qty: 180 TABLET | Refills: 0 | Status: SHIPPED | OUTPATIENT
Start: 2024-10-10

## 2024-10-10 NOTE — TELEPHONE ENCOUNTER
Klonopin      Last Written Prescription Date:  7/30/24  Last Fill Quantity: 180,   # refills: 0  Last Office Visit: 8/1/24  Future Office visit:       Routing refill request to provider for review/approval because:

## 2024-12-26 ENCOUNTER — HOSPITAL ENCOUNTER (EMERGENCY)
Facility: HOSPITAL | Age: 74
Discharge: OTHER INSTITUTION | End: 2024-12-27
Attending: STUDENT IN AN ORGANIZED HEALTH CARE EDUCATION/TRAINING PROGRAM
Payer: MEDICARE

## 2024-12-26 ENCOUNTER — APPOINTMENT (OUTPATIENT)
Dept: CT IMAGING | Facility: HOSPITAL | Age: 74
End: 2024-12-26
Attending: STUDENT IN AN ORGANIZED HEALTH CARE EDUCATION/TRAINING PROGRAM
Payer: MEDICARE

## 2024-12-26 DIAGNOSIS — R11.2 NAUSEA AND VOMITING, UNSPECIFIED VOMITING TYPE: ICD-10-CM

## 2024-12-26 DIAGNOSIS — R74.8 ABNORMAL LIVER ENZYMES: ICD-10-CM

## 2024-12-26 DIAGNOSIS — R10.84 ABDOMINAL PAIN, GENERALIZED: ICD-10-CM

## 2024-12-26 DIAGNOSIS — E87.1 HYPONATREMIA: ICD-10-CM

## 2024-12-26 DIAGNOSIS — R53.1 WEAKNESS GENERALIZED: ICD-10-CM

## 2024-12-26 DIAGNOSIS — R93.2 ABNORMAL LIVER CT: ICD-10-CM

## 2024-12-26 LAB
ALBUMIN SERPL BCG-MCNC: 3.6 G/DL (ref 3.5–5.2)
ALBUMIN UR-MCNC: 30 MG/DL
ALP SERPL-CCNC: 1372 U/L (ref 40–150)
ALT SERPL W P-5'-P-CCNC: 429 U/L (ref 0–50)
ANION GAP SERPL CALCULATED.3IONS-SCNC: 17 MMOL/L (ref 7–15)
APPEARANCE UR: ABNORMAL
AST SERPL W P-5'-P-CCNC: 636 U/L (ref 0–45)
ATRIAL RATE - MUSE: 114 BPM
BACTERIA #/AREA URNS HPF: ABNORMAL /HPF
BASOPHILS # BLD AUTO: 0 10E3/UL (ref 0–0.2)
BASOPHILS NFR BLD AUTO: 0 %
BILIRUB SERPL-MCNC: 4.6 MG/DL
BILIRUB UR QL STRIP: ABNORMAL
BUN SERPL-MCNC: 15.4 MG/DL (ref 8–23)
CALCIUM SERPL-MCNC: 9.3 MG/DL (ref 8.8–10.4)
CHLORIDE SERPL-SCNC: 89 MMOL/L (ref 98–107)
COLOR UR AUTO: ABNORMAL
CREAT SERPL-MCNC: 0.65 MG/DL (ref 0.51–0.95)
DIASTOLIC BLOOD PRESSURE - MUSE: NORMAL MMHG
EGFRCR SERPLBLD CKD-EPI 2021: >90 ML/MIN/1.73M2
EOSINOPHIL # BLD AUTO: 0 10E3/UL (ref 0–0.7)
EOSINOPHIL NFR BLD AUTO: 0 %
ERYTHROCYTE [DISTWIDTH] IN BLOOD BY AUTOMATED COUNT: 12.4 % (ref 10–15)
FLUAV RNA SPEC QL NAA+PROBE: NEGATIVE
FLUBV RNA RESP QL NAA+PROBE: NEGATIVE
GLUCOSE SERPL-MCNC: 115 MG/DL (ref 70–99)
GLUCOSE UR STRIP-MCNC: NEGATIVE MG/DL
HCO3 SERPL-SCNC: 21 MMOL/L (ref 22–29)
HCT VFR BLD AUTO: 39.1 % (ref 35–47)
HGB BLD-MCNC: 13.7 G/DL (ref 11.7–15.7)
HGB UR QL STRIP: NEGATIVE
HOLD SPECIMEN: NORMAL
HOLD SPECIMEN: NORMAL
HYALINE CASTS: 39 /LPF
IMM GRANULOCYTES # BLD: 0 10E3/UL
IMM GRANULOCYTES NFR BLD: 0 %
INTERPRETATION ECG - MUSE: NORMAL
KETONES UR STRIP-MCNC: ABNORMAL MG/DL
LEUKOCYTE ESTERASE UR QL STRIP: ABNORMAL
LIPASE SERPL-CCNC: 16 U/L (ref 13–60)
LYMPHOCYTES # BLD AUTO: 1.2 10E3/UL (ref 0.8–5.3)
LYMPHOCYTES NFR BLD AUTO: 9 %
MCH RBC QN AUTO: 34.3 PG (ref 26.5–33)
MCHC RBC AUTO-ENTMCNC: 35 G/DL (ref 31.5–36.5)
MCV RBC AUTO: 98 FL (ref 78–100)
MONOCYTES # BLD AUTO: 1.5 10E3/UL (ref 0–1.3)
MONOCYTES NFR BLD AUTO: 11 %
MUCOUS THREADS #/AREA URNS LPF: PRESENT /LPF
NEUTROPHILS # BLD AUTO: 10.5 10E3/UL (ref 1.6–8.3)
NEUTROPHILS NFR BLD AUTO: 80 %
NITRATE UR QL: POSITIVE
NRBC # BLD AUTO: 0 10E3/UL
NRBC BLD AUTO-RTO: 0 /100
P AXIS - MUSE: 68 DEGREES
PH UR STRIP: 6 [PH] (ref 4.7–8)
PLATELET # BLD AUTO: 383 10E3/UL (ref 150–450)
POTASSIUM SERPL-SCNC: 4 MMOL/L (ref 3.4–5.3)
PR INTERVAL - MUSE: 152 MS
PROT SERPL-MCNC: 7.5 G/DL (ref 6.4–8.3)
QRS DURATION - MUSE: 74 MS
QT - MUSE: 368 MS
QTC - MUSE: 507 MS
R AXIS - MUSE: 5 DEGREES
RBC # BLD AUTO: 4 10E6/UL (ref 3.8–5.2)
RBC URINE: 3 /HPF
RSV RNA SPEC NAA+PROBE: NEGATIVE
SARS-COV-2 RNA RESP QL NAA+PROBE: NEGATIVE
SODIUM SERPL-SCNC: 127 MMOL/L (ref 135–145)
SP GR UR STRIP: 1.02 (ref 1–1.03)
SQUAMOUS EPITHELIAL: 5 /HPF
SYSTOLIC BLOOD PRESSURE - MUSE: NORMAL MMHG
T AXIS - MUSE: 51 DEGREES
UROBILINOGEN UR STRIP-MCNC: 8 MG/DL
VENTRICULAR RATE- MUSE: 114 BPM
WBC # BLD AUTO: 13.2 10E3/UL (ref 4–11)
WBC CLUMPS #/AREA URNS HPF: PRESENT /HPF
WBC URINE: 32 /HPF

## 2024-12-26 PROCEDURE — 93010 ELECTROCARDIOGRAM REPORT: CPT | Performed by: INTERNAL MEDICINE

## 2024-12-26 PROCEDURE — 87637 SARSCOV2&INF A&B&RSV AMP PRB: CPT | Performed by: STUDENT IN AN ORGANIZED HEALTH CARE EDUCATION/TRAINING PROGRAM

## 2024-12-26 PROCEDURE — 99285 EMERGENCY DEPT VISIT HI MDM: CPT | Performed by: STUDENT IN AN ORGANIZED HEALTH CARE EDUCATION/TRAINING PROGRAM

## 2024-12-26 PROCEDURE — 96361 HYDRATE IV INFUSION ADD-ON: CPT

## 2024-12-26 PROCEDURE — 85025 COMPLETE CBC W/AUTO DIFF WBC: CPT | Performed by: STUDENT IN AN ORGANIZED HEALTH CARE EDUCATION/TRAINING PROGRAM

## 2024-12-26 PROCEDURE — 82435 ASSAY OF BLOOD CHLORIDE: CPT | Performed by: STUDENT IN AN ORGANIZED HEALTH CARE EDUCATION/TRAINING PROGRAM

## 2024-12-26 PROCEDURE — 83690 ASSAY OF LIPASE: CPT | Performed by: STUDENT IN AN ORGANIZED HEALTH CARE EDUCATION/TRAINING PROGRAM

## 2024-12-26 PROCEDURE — 81003 URINALYSIS AUTO W/O SCOPE: CPT | Performed by: STUDENT IN AN ORGANIZED HEALTH CARE EDUCATION/TRAINING PROGRAM

## 2024-12-26 PROCEDURE — 250N000011 HC RX IP 250 OP 636: Performed by: STUDENT IN AN ORGANIZED HEALTH CARE EDUCATION/TRAINING PROGRAM

## 2024-12-26 PROCEDURE — 87186 SC STD MICRODIL/AGAR DIL: CPT | Performed by: STUDENT IN AN ORGANIZED HEALTH CARE EDUCATION/TRAINING PROGRAM

## 2024-12-26 PROCEDURE — G1010 CDSM STANSON: HCPCS

## 2024-12-26 PROCEDURE — 36415 COLL VENOUS BLD VENIPUNCTURE: CPT | Performed by: STUDENT IN AN ORGANIZED HEALTH CARE EDUCATION/TRAINING PROGRAM

## 2024-12-26 PROCEDURE — 93005 ELECTROCARDIOGRAM TRACING: CPT

## 2024-12-26 PROCEDURE — 84155 ASSAY OF PROTEIN SERUM: CPT | Performed by: STUDENT IN AN ORGANIZED HEALTH CARE EDUCATION/TRAINING PROGRAM

## 2024-12-26 PROCEDURE — 258N000003 HC RX IP 258 OP 636: Performed by: EMERGENCY MEDICINE

## 2024-12-26 PROCEDURE — 74177 CT ABD & PELVIS W/CONTRAST: CPT | Mod: MG

## 2024-12-26 PROCEDURE — 99285 EMERGENCY DEPT VISIT HI MDM: CPT | Mod: 25

## 2024-12-26 PROCEDURE — 85018 HEMOGLOBIN: CPT | Performed by: STUDENT IN AN ORGANIZED HEALTH CARE EDUCATION/TRAINING PROGRAM

## 2024-12-26 PROCEDURE — 96375 TX/PRO/DX INJ NEW DRUG ADDON: CPT

## 2024-12-26 PROCEDURE — 258N000003 HC RX IP 258 OP 636: Performed by: STUDENT IN AN ORGANIZED HEALTH CARE EDUCATION/TRAINING PROGRAM

## 2024-12-26 PROCEDURE — 96374 THER/PROPH/DIAG INJ IV PUSH: CPT | Mod: XU

## 2024-12-26 RX ORDER — SODIUM CHLORIDE, SODIUM LACTATE, POTASSIUM CHLORIDE, CALCIUM CHLORIDE 600; 310; 30; 20 MG/100ML; MG/100ML; MG/100ML; MG/100ML
INJECTION, SOLUTION INTRAVENOUS CONTINUOUS
Status: ACTIVE | OUTPATIENT
Start: 2024-12-26

## 2024-12-26 RX ORDER — ONDANSETRON 2 MG/ML
4 INJECTION INTRAMUSCULAR; INTRAVENOUS ONCE
Status: COMPLETED | OUTPATIENT
Start: 2024-12-26 | End: 2024-12-26

## 2024-12-26 RX ORDER — HYDROMORPHONE HYDROCHLORIDE 1 MG/ML
0.5 INJECTION, SOLUTION INTRAMUSCULAR; INTRAVENOUS; SUBCUTANEOUS ONCE
Status: COMPLETED | OUTPATIENT
Start: 2024-12-26 | End: 2024-12-26

## 2024-12-26 RX ORDER — IOPAMIDOL 755 MG/ML
58 INJECTION, SOLUTION INTRAVASCULAR ONCE
Status: COMPLETED | OUTPATIENT
Start: 2024-12-26 | End: 2024-12-26

## 2024-12-26 RX ADMIN — IOPAMIDOL 58 ML: 755 INJECTION, SOLUTION INTRAVENOUS at 19:06

## 2024-12-26 RX ADMIN — HYDROMORPHONE HYDROCHLORIDE 0.5 MG: 1 INJECTION, SOLUTION INTRAMUSCULAR; INTRAVENOUS; SUBCUTANEOUS at 19:33

## 2024-12-26 RX ADMIN — SODIUM CHLORIDE 1000 ML: 9 INJECTION, SOLUTION INTRAVENOUS at 18:28

## 2024-12-26 RX ADMIN — SODIUM CHLORIDE, POTASSIUM CHLORIDE, SODIUM LACTATE AND CALCIUM CHLORIDE: 600; 310; 30; 20 INJECTION, SOLUTION INTRAVENOUS at 20:43

## 2024-12-26 RX ADMIN — ONDANSETRON 4 MG: 2 INJECTION INTRAMUSCULAR; INTRAVENOUS at 18:27

## 2024-12-26 ASSESSMENT — ENCOUNTER SYMPTOMS
FATIGUE: 0
HEMATURIA: 0
CHILLS: 0
ACTIVITY CHANGE: 0
ARTHRALGIAS: 0
ABDOMINAL PAIN: 1
SORE THROAT: 0
APPETITE CHANGE: 0
DYSURIA: 0
DIZZINESS: 0
NECK STIFFNESS: 0
MYALGIAS: 0
SHORTNESS OF BREATH: 0
EYE REDNESS: 0
VOMITING: 1
PSYCHIATRIC NEGATIVE: 1
NAUSEA: 1
DIARRHEA: 1
RHINORRHEA: 0
HEADACHES: 0
FEVER: 0
COUGH: 0

## 2024-12-26 ASSESSMENT — ACTIVITIES OF DAILY LIVING (ADL)
ADLS_ACUITY_SCORE: 54

## 2024-12-26 ASSESSMENT — COLUMBIA-SUICIDE SEVERITY RATING SCALE - C-SSRS
6. HAVE YOU EVER DONE ANYTHING, STARTED TO DO ANYTHING, OR PREPARED TO DO ANYTHING TO END YOUR LIFE?: NO
2. HAVE YOU ACTUALLY HAD ANY THOUGHTS OF KILLING YOURSELF IN THE PAST MONTH?: NO
1. IN THE PAST MONTH, HAVE YOU WISHED YOU WERE DEAD OR WISHED YOU COULD GO TO SLEEP AND NOT WAKE UP?: NO

## 2024-12-26 NOTE — ED TRIAGE NOTES
Patient her with her daughter and grand daughter who states that patient has had nausea, vomiting and diarrhea for the last 3-4 days.Patient states she is very tired and just wants to sleep.  States she can not walk because she is weak. States this has been getting worse the last 2-3 weeks. Patient lives on her own and daughter and granddaughter go over once a day to change her and help her. States patient does not want to go to an assisted living but may be getting close.      Triage Assessment (Adult)       Row Name 12/26/24 1302          Triage Assessment    Airway WDL WDL        Respiratory WDL    Respiratory WDL WDL        Skin Circulation/Temperature WDL    Skin Circulation/Temperature WDL WDL        Cardiac WDL    Cardiac WDL WDL        Peripheral/Neurovascular WDL    Peripheral Neurovascular WDL WDL        Cognitive/Neuro/Behavioral WDL    Cognitive/Neuro/Behavioral WDL WDL

## 2024-12-27 VITALS
TEMPERATURE: 96.5 F | RESPIRATION RATE: 17 BRPM | OXYGEN SATURATION: 98 % | HEART RATE: 105 BPM | DIASTOLIC BLOOD PRESSURE: 99 MMHG | SYSTOLIC BLOOD PRESSURE: 130 MMHG

## 2024-12-27 LAB
ATRIAL RATE - MUSE: 114 BPM
DIASTOLIC BLOOD PRESSURE - MUSE: NORMAL MMHG
INTERPRETATION ECG - MUSE: NORMAL
P AXIS - MUSE: 68 DEGREES
PR INTERVAL - MUSE: 152 MS
QRS DURATION - MUSE: 74 MS
QT - MUSE: 368 MS
QTC - MUSE: 507 MS
R AXIS - MUSE: 5 DEGREES
SYSTOLIC BLOOD PRESSURE - MUSE: NORMAL MMHG
T AXIS - MUSE: 51 DEGREES
VENTRICULAR RATE- MUSE: 114 BPM

## 2024-12-27 NOTE — ED NOTES
Face to face report given with opportunity to observe patient.    Report given to LON Zimmerman RN   12/26/2024  10:59 PM

## 2024-12-27 NOTE — ED PROVIDER NOTES
Patient was signed out to me at shift change by Dr. Fonseca.  Please see her note for initial emergency department presentation and emergency department course.  At the time of signout, CT abdomen pelvis results were pending.    CT showing innumerable nodules seen throughout the liver consistent with diffuse metastatic disease.  New hepatomegaly.  Primary tumor not identified in this exam.  4 cm distal AAA is minimally changed compared to previous exam and September of this year.     Mabel Du MD  12/26/24 2018      Spoke with [facility] stat doc, Dr. Karen Tomlin regarding transfer and admission to [facility].  We do not have beds available here in [place].  Dr. Naseem Tomlin has accepted the patient for admission.  Patient will be transferred to [facility] once ambulance transportation is available.  Patient has finished a liter of IV fluid and I started additional fluids at 150 cc an hour.  Pain is improved after Dilaudid, nausea is improved after Zofran.  Heart rate continues to be slightly high between 115 and 120.     Of note, patient does report that she drinks alcohol daily, however, has not had any alcohol for the last 2 days because of her vomiting.  She denies likelihood of alcohol withdrawal.    EKG done earlier this evening just after 6:00 was reviewed by me and shows sinus tachycardia with a rate of 114 bpm.     Mabel Du MD  12/26/24 2129

## 2024-12-27 NOTE — ED PROVIDER NOTES
History     Chief Complaint   Patient presents with     Nausea, Vomiting, & Diarrhea     HPI  Raquel Sanchez is a 74 year old female who presents in company of daughter and granddaughter for the chief complaint of weakness, nausea, vomiting, abdominal pain.  She lives alone, is a daily drinker of vodka.  It is unclear how much as family over states that a family friend comes over and drinks with her all day and they do not measure their drinks.  She has a history of alcohol abuse, tobacco use disorder, hyperlipidemia, hypertension, PAD, protein calorie malnutrition, depression, prolonged QT.  She was admitted in September 2023 to [facility] for biliary obstruction, and underwent MRCP and ERCP, had multiple large brown pigment stones which were removed, biliary stent was placed there was no evidence of pancreatic mass or cholangitis during these interventions.  Family mentions to me that they have tried repeatedly to place the patient in assisted living and the patient has refused though she very much struggles with her ADLs and relies heavily on family.  Patient tells me her last drink was 2 days ago as she has been too ill to drink.  She denies having ever gone through withdrawals, denies having had any seizures in the past.  She has never been to a detox facility.  Allergies:  Allergies   Allergen Reactions     Tape [Adhesive Tape] Blisters     Seasonal Allergies      Poison Ivy Extract [Poison Ivy Extract] Rash       Problem List:    Patient Active Problem List    Diagnosis Date Noted     Biliary obstruction (H) 09/21/2023     Priority: Medium     Pancreatic mass 09/05/2023     Priority: Medium     Dilation of biliary tract 09/05/2023     Priority: Medium     Recurrent major depressive disorder, in remission (H) 06/21/2022     Priority: Medium     Spondylolisthesis at L4-L5 level 06/21/2022     Priority: Medium     Chronic low back pain 06/13/2022     Priority: Medium     Weakness 06/07/2022     Priority:  Medium     Esophagitis 06/07/2022     Priority: Medium     Generalized muscle weakness 06/07/2022     Priority: Medium     Failure to thrive in adult 06/07/2022     Priority: Medium     COVID-19 06/07/2022     Priority: Medium     Decubitus ulcer of buttock 06/07/2022     Priority: Medium     Acute cystitis without hematuria 06/07/2022     Priority: Medium     Protein-calorie malnutrition (H) 06/07/2022     Priority: Medium     Pleural effusion 06/07/2022     Priority: Medium     Alcohol use 04/21/2022     Priority: Medium     Hyperlipemia 04/21/2022     Priority: Medium     Hypertension 04/21/2022     Priority: Medium     Prolonged Q-T interval on ECG 04/21/2022     Priority: Medium     Tobacco abuse 10/15/2019     Priority: Medium     Pre-op exam 10/15/2019     Priority: Medium     Hyponatremia 10/15/2019     Priority: Medium     Fall at home, initial encounter 10/15/2019     Priority: Medium     Closed right hip fracture, initial encounter (H) 10/15/2019     Priority: Medium     Alcohol abuse, uncomplicated 10/15/2019     Priority: Medium     Chronic, continuous use of opioids 01/10/2018     Priority: Medium     Patient is followed by Pelon David MD for ongoing prescription of pain medication.  All refills should only be approved by this provider, or covering partner.    Medication(s): Norco 5/325mg.   Maximum quantity per month: #60  Clinic visit frequency required: Q 6  months     Controlled substance agreement:  Encounter-Level CSA - 01/25/2017:              Controlled Substance Agreement - Scan on 2/2/2017 10:36 AM : SCHEDULED MEDICATION USE AGREEMENT (below)                Pain Clinic evaluation in the past: No    DIRE Total Score(s):  No flowsheet data found.    Last [facility] website verification:  done on 9.12.17   [email or web address];       Dysthymia 09/28/2017     Priority: Medium     Controlled substance agreement signed 01/25/2017     Priority: Medium        Past Medical History:    No  past medical history on file.    Past Surgical History:    Past Surgical History:   Procedure Laterality Date     GYN SURGERY      hysterectomy     IR CONSULTATION FOR IR EXAM  1/4/2022       Family History:    Family History   Problem Relation Age of Onset     Diabetes No family hx of      Asthma No family hx of      Thyroid Disease No family hx of      Hypertension No family hx of      Hyperlipidemia No family hx of        Social History:  Marital Status:   [5]  Social History     Tobacco Use     Smoking status: Every Day     Current packs/day: 1.00     Average packs/day: 1 pack/day for 59.0 years (59.0 ttl pk-yrs)     Types: Cigarettes     Start date: 1/1/1966     Smokeless tobacco: Never     Tobacco comments:     Pt denies quit plan 2/02/2023   Substance Use Topics     Alcohol use: Yes     Alcohol/week: 0.0 standard drinks of alcohol     Comment: occasional     Drug use: No        Medications:    acetaminophen (TYLENOL) 325 MG tablet  atorvastatin (LIPITOR) 40 MG tablet  camphor-menthol (DERMASARRA) 0.5-0.5 % external lotion  citalopram (CELEXA) 40 MG tablet  clonazePAM (KLONOPIN) 0.5 MG tablet  diclofenac (VOLTAREN) 1 % topical gel  hydrOXYzine marcella (VISTARIL) 25 MG capsule  miconazole (MICATIN) 2 % external powder  Multiple Vitamins-Minerals (MULTIVITAMIN ADULTS 50+) TABS  nabumetone (RELAFEN) 500 MG tablet  potassium chloride berhane ER (KLOR-CON M20) 20 MEQ CR tablet  predniSONE (DELTASONE) 10 MG tablet  triamcinolone (ARISTOCORT HP) 0.5 % external cream          Review of Systems   Constitutional:  Negative for activity change, appetite change, chills, fatigue and fever.   HENT:  Negative for congestion, rhinorrhea and sore throat.    Eyes:  Negative for redness.   Respiratory:  Negative for cough and shortness of breath.    Cardiovascular:  Negative for chest pain.   Gastrointestinal:  Positive for abdominal pain, diarrhea, nausea and vomiting.   Genitourinary:  Negative for dysuria and hematuria.    Musculoskeletal:  Negative for arthralgias, myalgias and neck stiffness.   Skin:  Negative for rash.   Neurological:  Negative for dizziness and headaches.   Psychiatric/Behavioral: Negative.     All other systems reviewed and are negative.      Physical Exam   BP: 137/93  Pulse: 119  Temp: 97  F (36.1  C)  Resp: 16  SpO2: 96 %      Physical Exam  Vitals and nursing note reviewed.   Constitutional:       Appearance: Normal appearance. She is ill-appearing.   HENT:      Head: Normocephalic and atraumatic.      Nose: Nose normal.      Mouth/Throat:      Mouth: Mucous membranes are moist.   Eyes:      Pupils: Pupils are equal, round, and reactive to light.   Cardiovascular:      Rate and Rhythm: Regular rhythm. Tachycardia present.      Pulses: Normal pulses.      Heart sounds: Normal heart sounds.   Pulmonary:      Effort: Pulmonary effort is normal. No respiratory distress.      Breath sounds: Normal breath sounds.   Chest:      Chest wall: No tenderness.   Abdominal:      Tenderness: There is no abdominal tenderness.      Comments: Tender to palpation right upper quadrant, left upper quadrant   Musculoskeletal:         General: Normal range of motion.      Cervical back: No tenderness.      Thoracic back: No tenderness.      Lumbar back: No tenderness.   Neurological:      General: No focal deficit present.      Mental Status: She is alert and oriented to person, place, and time.   Psychiatric:         Mood and Affect: Mood normal.         ED Course     ED Course as of 12/26/24 1912   Thu Dec 26, 2024   1912 Pt signed out to Dr. Du pending [facility]     Procedures              EKG Interpretation:      Interpreted by Sander Fonseca DO  Time reviewed: 1809  Symptoms at time of EKG: Weakness, nausea  Rhythm: Sinus tachycardia  Rate: Tachycardia  Axis: normal  Ectopy: none  Conduction: normal  ST Segments/ T Waves: No ST-T wave changes  Q Waves: none  Comparison to prior: Unchanged    Clinical Impression: normal  EKG      Critical Care time:  {none or minutes:781249}  {Trauma Activation or Fall?:363478}  {Sepsis/Septic Shock/Stemi/Stroke:033416}         Results for orders placed or performed during the hospital encounter of 12/26/24 (from the past 24 hours)   Influenza A/B, RSV and SARS-CoV2 PCR (COVID-19) Nose    Specimen: Nose; Swab   Result Value Ref Range    Influenza A PCR Negative Negative    Influenza B PCR Negative Negative    RSV PCR Negative Negative    SARS CoV2 PCR Negative Negative    Narrative    Testing was performed using the Xpert Xpress CoV2/Flu/RSV Assay on the Cepheid GeneXpert Instrument. This test should be ordered for the detection of SARS-CoV2, influenza, and RSV viruses in individuals with signs and symptoms of respiratory tract infection. This test is for in vitro diagnostic use under the [place] FDA for laboratories certified under CLIA to perform high or moderate complexity testing. This test has been [place] FDA cleared. A negative result does not rule out the presence of PCR inhibitors in the specimen or target RNA in concentration below the limit of detection for the assay. If only one viral target is positive but coinfection with multiple targets is suspected, the sample should be re-tested with another FDA cleared, approved, or authorized test, if coninfection would change clinical management. This test was validated by the [facility]. These laboratories are certified under the Clinical Laboratory Improvement Amendments of 1988 (CLIA-88) as qualified to perfom high complexity laboratory testing.   [facility] *Canceled*    Narrative    The following orders were created for panel order [facility].  Procedure                               Abnormality         Status                     ---------                               -----------         ------                       Please view results for these tests on the individual orders.   CBC with Platelets & Differential    Narrative     The following orders were created for panel order CBC with Platelets & Differential.  Procedure                               Abnormality         Status                     ---------                               -----------         ------                     CBC with platelets and d...[[id number]]  Abnormal            Final result                 Please view results for these tests on the individual orders.   Comprehensive metabolic panel   Result Value Ref Range    Sodium 127 (L) 135 - 145 mmol/L    Potassium 4.0 3.4 - 5.3 mmol/L    Carbon Dioxide (CO2) 21 (L) 22 - 29 mmol/L    Anion Gap 17 (H) 7 - 15 mmol/L    Urea Nitrogen 15.4 8.0 - 23.0 mg/dL    Creatinine 0.65 0.51 - 0.95 mg/dL    GFR Estimate >90 >60 mL/min/1.73m2    Calcium 9.3 8.8 - 10.4 mg/dL    Chloride 89 (L) 98 - 107 mmol/L    Glucose 115 (H) 70 - 99 mg/dL    Alkaline Phosphatase 1,372 (H) 40 - 150 U/L     (HH) 0 - 45 U/L     (H) 0 - 50 U/L    Protein Total 7.5 6.4 - 8.3 g/dL    Albumin 3.6 3.5 - 5.2 g/dL    Bilirubin Total 4.6 (H) <=1.2 mg/dL   CBC with platelets and differential   Result Value Ref Range    WBC Count 13.2 (H) 4.0 - 11.0 10e3/uL    RBC Count 4.00 3.80 - 5.20 10e6/uL    Hemoglobin 13.7 11.7 - 15.7 g/dL    Hematocrit 39.1 35.0 - 47.0 %    MCV 98 78 - 100 fL    MCH 34.3 (H) 26.5 - 33.0 pg    MCHC 35.0 31.5 - 36.5 g/dL    RDW 12.4 10.0 - 15.0 %    Platelet Count 383 150 - 450 10e3/uL    % Neutrophils 80 %    % Lymphocytes 9 %    % Monocytes 11 %    % Eosinophils 0 %    % Basophils 0 %    % Immature Granulocytes 0 %    NRBCs per 100 WBC 0 <1 /100    Absolute Neutrophils 10.5 (H) 1.6 - 8.3 10e3/uL    Absolute Lymphocytes 1.2 0.8 - 5.3 10e3/uL    Absolute Monocytes 1.5 (H) 0.0 - 1.3 10e3/uL    Absolute Eosinophils 0.0 0.0 - 0.7 10e3/uL    Absolute Basophils 0.0 0.0 - 0.2 10e3/uL    Absolute Immature Granulocytes 0.0 <=0.4 10e3/uL    Absolute NRBCs 0.0 10e3/uL   Extra Tube    Narrative    The following orders were created for  "panel order Extra Tube.  Procedure                               Abnormality         Status                     ---------                               -----------         ------                     Extra Blue Top Tube[[id number]]                              In process                 Extra Red Top Tube[[id number]]                               In process                   Please view results for these tests on the individual orders.       Medications   ondansetron (ZOFRAN) injection 4 mg (has no administration in time range)   sodium chloride 0.9% BOLUS 1,000 mL (has no administration in time range)       Assessments & Plan (with Medical Decision Making)     I have reviewed the nursing notes.    I have reviewed the findings, diagnosis, plan and need for follow up with the patient.  {ED Addendum:433754::\" \"}        Medical Decision Making  The patient's presentation was of {[facility] Problem:955717}.    The patient's evaluation involved:  {[facility] Data:907386}    The patient's management necessitated {[facility] Management:423291}.        New Prescriptions    No medications on file       Final diagnoses:   None       12/26/2024   [facility]GENCY [facility]    "

## 2024-12-29 LAB — BACTERIA UR CULT: ABNORMAL
